# Patient Record
Sex: FEMALE | Race: ASIAN | Employment: OTHER | URBAN - METROPOLITAN AREA
[De-identification: names, ages, dates, MRNs, and addresses within clinical notes are randomized per-mention and may not be internally consistent; named-entity substitution may affect disease eponyms.]

---

## 2016-11-10 LAB — MAMMOGRAPHY, EXTERNAL: NORMAL

## 2017-02-01 ENCOUNTER — HOSPITAL ENCOUNTER (OUTPATIENT)
Dept: LAB | Age: 77
Discharge: HOME | End: 2017-02-01
Payer: MEDICARE

## 2017-02-01 ENCOUNTER — OFFICE VISIT (OUTPATIENT)
Dept: FAMILY MEDICINE CLINIC | Age: 77
End: 2017-02-01

## 2017-02-01 VITALS
SYSTOLIC BLOOD PRESSURE: 129 MMHG | DIASTOLIC BLOOD PRESSURE: 70 MMHG | HEART RATE: 78 BPM | OXYGEN SATURATION: 93 % | RESPIRATION RATE: 16 BRPM | WEIGHT: 87 LBS | HEIGHT: 62 IN | TEMPERATURE: 97 F | BODY MASS INDEX: 16.01 KG/M2

## 2017-02-01 DIAGNOSIS — E78.5 HYPERLIPIDEMIA, UNSPECIFIED HYPERLIPIDEMIA TYPE: ICD-10-CM

## 2017-02-01 DIAGNOSIS — E55.9 VITAMIN D DEFICIENCY: ICD-10-CM

## 2017-02-01 DIAGNOSIS — I10 ESSENTIAL HYPERTENSION, BENIGN: ICD-10-CM

## 2017-02-01 DIAGNOSIS — I10 ESSENTIAL HYPERTENSION, BENIGN: Primary | ICD-10-CM

## 2017-02-01 DIAGNOSIS — J47.9 BRONCHIECTASIS WITHOUT COMPLICATION (HCC): ICD-10-CM

## 2017-02-01 DIAGNOSIS — F32.1 MODERATE SINGLE CURRENT EPISODE OF MAJOR DEPRESSIVE DISORDER (HCC): ICD-10-CM

## 2017-02-01 DIAGNOSIS — M81.0 OSTEOPOROSIS: ICD-10-CM

## 2017-02-01 LAB
ALBUMIN SERPL BCP-MCNC: 3.7 G/DL (ref 3.4–5)
ALBUMIN/GLOB SERPL: 1 {RATIO} (ref 0.8–1.7)
ALP SERPL-CCNC: 41 U/L (ref 45–117)
ALT SERPL-CCNC: 18 U/L (ref 13–56)
ANION GAP BLD CALC-SCNC: 7 MMOL/L (ref 3–18)
AST SERPL W P-5'-P-CCNC: 18 U/L (ref 15–37)
BASOPHILS # BLD AUTO: 0 K/UL (ref 0–0.06)
BASOPHILS # BLD: 0 % (ref 0–2)
BILIRUB SERPL-MCNC: 0.5 MG/DL (ref 0.2–1)
BUN SERPL-MCNC: 12 MG/DL (ref 7–18)
BUN/CREAT SERPL: 18 (ref 12–20)
CALCIUM SERPL-MCNC: 8.3 MG/DL (ref 8.5–10.1)
CHLORIDE SERPL-SCNC: 100 MMOL/L (ref 100–108)
CHOLEST SERPL-MCNC: 186 MG/DL
CO2 SERPL-SCNC: 31 MMOL/L (ref 21–32)
CREAT SERPL-MCNC: 0.65 MG/DL (ref 0.6–1.3)
DIFFERENTIAL METHOD BLD: NORMAL
EOSINOPHIL # BLD: 0.2 K/UL (ref 0–0.4)
EOSINOPHIL NFR BLD: 3 % (ref 0–5)
ERYTHROCYTE [DISTWIDTH] IN BLOOD BY AUTOMATED COUNT: 13.7 % (ref 11.6–14.5)
GLOBULIN SER CALC-MCNC: 3.8 G/DL (ref 2–4)
GLUCOSE SERPL-MCNC: 82 MG/DL (ref 74–99)
HCT VFR BLD AUTO: 43.1 % (ref 35–45)
HDLC SERPL-MCNC: 96 MG/DL (ref 40–60)
HDLC SERPL: 1.9 {RATIO} (ref 0–5)
HGB BLD-MCNC: 13.9 G/DL (ref 12–16)
LDLC SERPL CALC-MCNC: 78.4 MG/DL (ref 0–100)
LIPID PROFILE,FLP: ABNORMAL
LYMPHOCYTES # BLD AUTO: 47 % (ref 21–52)
LYMPHOCYTES # BLD: 2.4 K/UL (ref 0.9–3.6)
MCH RBC QN AUTO: 30.4 PG (ref 24–34)
MCHC RBC AUTO-ENTMCNC: 32.3 G/DL (ref 31–37)
MCV RBC AUTO: 94.3 FL (ref 74–97)
MONOCYTES # BLD: 0.4 K/UL (ref 0.05–1.2)
MONOCYTES NFR BLD AUTO: 7 % (ref 3–10)
NEUTS SEG # BLD: 2.3 K/UL (ref 1.8–8)
NEUTS SEG NFR BLD AUTO: 43 % (ref 40–73)
PLATELET # BLD AUTO: 214 K/UL (ref 135–420)
PMV BLD AUTO: 11.1 FL (ref 9.2–11.8)
POTASSIUM SERPL-SCNC: 4.4 MMOL/L (ref 3.5–5.5)
PROT SERPL-MCNC: 7.5 G/DL (ref 6.4–8.2)
RBC # BLD AUTO: 4.57 M/UL (ref 4.2–5.3)
SODIUM SERPL-SCNC: 138 MMOL/L (ref 136–145)
TRIGL SERPL-MCNC: 58 MG/DL (ref ?–150)
VLDLC SERPL CALC-MCNC: 11.6 MG/DL
WBC # BLD AUTO: 5.3 K/UL (ref 4.6–13.2)

## 2017-02-01 PROCEDURE — 80061 LIPID PANEL: CPT | Performed by: INTERNAL MEDICINE

## 2017-02-01 PROCEDURE — 85025 COMPLETE CBC W/AUTO DIFF WBC: CPT | Performed by: INTERNAL MEDICINE

## 2017-02-01 PROCEDURE — 36415 COLL VENOUS BLD VENIPUNCTURE: CPT | Performed by: INTERNAL MEDICINE

## 2017-02-01 PROCEDURE — 80053 COMPREHEN METABOLIC PANEL: CPT | Performed by: INTERNAL MEDICINE

## 2017-02-01 NOTE — MR AVS SNAPSHOT
Visit Information Date & Time Provider Department Dept. Phone Encounter #  
 2/1/2017 10:30 AM Heather Betancourt MD [place] [address] [id number] Follow-up Instructions Return in about 3 months (around 5/1/2017) for follow up. Your Appointments 7/31/2017 10:30 AM  
Office Visit with Heather Betancourt MD  
[place] [address] ([facility]) Appt Note: mwv  
 [address] [place]  
  
   
 [address] [facility] Upcoming Health Maintenance Date Due  
 MEDICARE YEARLY EXAM 7/29/2017 BREAST CANCER SCRN MAMMOGRAM 11/10/2017 GLAUCOMA SCREENING Q2Y 6/27/2018 COLONOSCOPY 1/1/2021 DTaP/Tdap/Td series (2 - Td) 7/28/2026 Allergies as of 2/1/2017  Review Complete On: 2/1/2017 By: Heather Betancourt MD  
 No Known Allergies Current Immunizations  Reviewed on 10/31/2016 Name Date Influenza High Dose Vaccine PF 10/31/2016 Influenza Vaccine 9/15/2014, 12/16/2013 Influenza Vaccine Split 12/4/2012 Pneumococcal Conjugate (PCV-13) 10/9/2015 Pneumococcal Polysaccharide (PPSV-23) 1/1/2005 Tdap 7/28/2016 Not reviewed this visit You Were Diagnosed With   
  
 Codes Comments Essential hypertension, benign    -  Primary ICD-10-CM: I10 
ICD-9-CM: 401.1 Hyperlipidemia, unspecified hyperlipidemia type     ICD-10-CM: E78.5 ICD-9-CM: 272.4 Osteoporosis     ICD-10-CM: M81.0 ICD-9-CM: 733.00 Moderate single current episode of major depressive disorder (HCC)     ICD-10-CM: F32.1 ICD-9-CM: 296.22 Vitamin D deficiency     ICD-10-CM: E55.9 ICD-9-CM: 268.9 Bronchiectasis without complication ([facility]     LRS-95-PI: J47.9 ICD-9-CM: 494.0 Vitals BP Pulse Temp Resp Height(growth percentile) Weight(growth percentile) 129/70 78 97 °F (36.1 °C) (Oral) 16 5' 1.5\" (1.562 m) 87 lb (39.5 kg) SpO2 BMI OB Status Smoking Status 93% 16.17 kg/m2 Postmenopausal Never Smoker Vitals History BMI and BSA Data Body Mass Index Body Surface Area  
 16.17 kg/m 2 1.31 m 2 Preferred Pharmacy Pharmacy Name Phone [facility], [address] [place] [address] [phone] Your Updated Medication List  
  
   
This list is accurate as of: 2/1/17 10:43 AM.  Always use your most recent med list. amLODIPine 5 mg tablet Commonly known as:  Isaiah Bailey Take 5 mg by mouth daily. CITRACAL + D PO Take  by mouth. flaxseed oil 1,000 mg Cap Take  by mouth. LYCOPENE PO Take  by mouth.  
  
 mirtazapine 30 mg tablet Commonly known as:  Antione Doles Take 1 Tab by mouth daily. red yeast rice extract 600 mg Cap Take 600 mg by mouth now.  
  
 simvastatin 20 mg tablet Commonly known as:  ZOCOR Take 1 Tab by mouth nightly. Follow-up Instructions Return in about 3 months (around 5/1/2017) for follow up. To-Do List   
 02/01/2017 Lab:  CBC WITH AUTOMATED DIFF   
  
 02/01/2017 Lab:  LIPID PANEL   
  
 02/01/2017 Lab:  METABOLIC PANEL, COMPREHENSIVE Patient Instructions Osteoporosis: Care Instructions Your Care Instructions Osteoporosis causes bones to become thin and weak. It is much more common in women than in men. Osteoporosis may be very advanced before you know you have it. Sometimes the first sign is a broken bone in the hip, spine, or wrist or sudden pain in your middle or lower back. Follow-up care is a key part of your treatment and safety. Be sure to make and go to all appointments, and call your doctor if you are having problems. Its also a good idea to know your test results and keep a list of the medicines you take. How can you care for yourself at home? · Your doctor may prescribe a bisphosphonate, such as risedronate (Actonel) or alendronate (Fosamax), for osteoporosis.  If you are taking one of these medicines by mouth: 
¨ Take your medicine with a full glass of water when you first get up in the morning. ¨ Do not lie down, eat, drink a beverage, or take any other medicine for at least 30 minutes after taking the drug. This helps prevent stomach problems. ¨ Do not take your medicine late in the day if you forgot to take it in the morning. Skip it, and take the usual dose the next morning. ¨ If you have side effects, tell your doctor. He or she may prescribe another medicine. · Get enough calcium and vitamin D. The [place] of Medicine recommends adults younger than age 46 need 1,000 mg of calcium and 600 IU of vitamin D each day. Women ages 46 to 79 need 1,200 mg of calcium and 600 IU of vitamin D each day. Men ages 46 to 79 need 1,000 mg of calcium and 600 IU of vitamin D each day. Adults 71 and older need 1,200 mg of calcium and 800 IU of vitamin D each day. Francisco Claudette ¨ Eat foods rich in calcium, like yogurt, cheese, milk, and dark green vegetables. This is a good way to get the calcium you need. You can get vitamin D from eggs, fatty fish, cereal, and milk. ¨ Talk to your doctor about taking a calcium plus vitamin D supplement. Be careful, though. Adults ages 23 to 48 should not get more than 2,500 mg of calcium and 4,000 IU of vitamin D each day, whether it is from supplements and/or food. Adults ages 46 and older should not get more than 2,000 mg of calcium and 4,000 IU of vitamin D each day from supplements and/or food. · Limit alcohol to 2 drinks a day for men and 1 drink a day for women. Too much alcohol can cause health problems. · Do not smoke. Smoking puts you at a much higher risk for osteoporosis. If you need help quitting, talk to your doctor about stop-smoking programs and medicines. These can increase your chances of quitting for good. · Get regular bone-building exercise.  Weight-bearing and resistance exercises keep bones healthy by working the muscles and bones against gravity. Start out at an exercise level that feels right for you. Add a little at a time until you can do the following: ¨ Do 30 minutes of weight-bearing exercise on most days of the week. Walking, jogging, stair climbing, and dancing are good choices. ¨ Do resistance exercises with weights or elastic bands 2 to 3 days a week. · Reduce your risk of falls: 
¨ Wear supportive shoes with low heels and nonslip soles. ¨ Use a cane or walker, if you need it. Use shower chairs and bath benches. Put in handrails on stairways, around your shower or tub area, and near the toilet. ¨ Keep stairs, porches, and walkways well lit. Use night-lights. ¨ Remove throw rugs and other objects that are in the way. ¨ Avoid icy, wet, or slippery surfaces. ¨ Keep a cordless phone and a flashlight with new batteries by your bed. When should you call for help? Call your doctor now or seek immediate medical care if: 
· You think you have broken a bone, have pain and swelling after a fall, or cannot move a part of your body. · You have sudden, severe pain when you stand or walk. Watch closely for changes in your health, and be sure to contact your doctor if you have any problems. Where can you learn more? Go to http://mandy-rossy.info/. Enter K100 in the search box to learn more about \"Osteoporosis: Care Instructions. \" Current as of: August 4, 2016 Content Version: 11.1 © 2596-5525 Healthwise, Incorporated. Care instructions adapted under license by [employer] (which disclaims liability or warranty for this information). If you have questions about a medical condition or this instruction, always ask your healthcare professional. [address] any warranty or liability for your use of this information. Introducing [facility] [place] [facility]! Dear Trini [place]: 
Thank you for requesting a Vantage[employer]M account.   Our records indicate that you already have an active USEUM account. You can access your account anytime at [email or web address]. Dial a Dealer/[employer] Works Did you know that you can access your hospital and ER discharge instructions at any time in USEUM? You can also review all of your test results from your hospital stay or ER visit. Additional Information If you have questions, please visit the Frequently Asked Questions section of the USEUM website at [email or web address]. Dial a Dealer/[employer] Playground[employer]/. Remember, USEUM is NOT to be used for urgent needs. For medical emergencies, dial 911. Now available from your iPhone and Android! Please provide this summary of care documentation to your next provider. Your primary care clinician is listed as Bernice Jimenez. If you have any questions after today's visit, please call [phone].

## 2017-02-01 NOTE — PROGRESS NOTES
Daphne Pantoja is here today to follow up for chronic conditions. 1. Have you been to the ER, urgent care clinic since your last visit? Hospitalized since your last visit? No    2. Have you seen or consulted any other health care providers outside of the [address] [place] [address] since your last visit? Include any pap smears or colon screening.  No

## 2017-02-01 NOTE — PROGRESS NOTES
Chief Complaint   Patient presents with    Hypertension     Follow up    Osteoporosis    Cholesterol Problem    Vitamin D Deficiency       Pt is a 67y.o. year old female who presents for follow up of her chronic medical problems    HTN  BP Readings from Last 3 Encounters:   02/01/17 129/70   10/31/16 130/60   07/28/16 116/61   Compliant with BP med    Wt Readings from Last 3 Encounters:   02/01/17 87 lb (39.5 kg)   10/31/16 87 lb 9.6 oz (39.7 kg)   07/28/16 86 lb 6.4 oz (39.2 kg)   Used to take Ensure-does not like it because it is too sweet    Vit D def  Lab Results   Component Value Date/Time    Vitamin D 25-Hydroxy 29.3 07/28/2016 11:04 AM    VITAMIN D, 25-HYDROXY 27.5 07/27/2015 09:52 AM    On OTC Ca+D     Hyperlipidemia  Lab Results   Component Value Date/Time    Cholesterol, total 187 07/28/2016 11:04 AM    Cholesterol, Total 191 09/07/2012 12:00 AM    HDL Cholesterol 87 07/28/2016 11:04 AM    LDL, calculated 86.2 07/28/2016 11:04 AM    VLDL, calculated 13.8 07/28/2016 11:04 AM    Triglyceride 69 07/28/2016 11:04 AM    CHOL/HDL Ratio 2.1 07/28/2016 11:04 AM   Fasting? yes  Compliant with Zocor-no side effects    Osteoporosis-had first dose of Prolia; niece who brings her in for the shot is out of town  [address] [place], [place] [address]/28/2014  [address]  Result Impression   Impression:    1.  BMD R Cee [address] 99. Saw Pulmo recently-note reviewed:  Naeem Tejeda MD - 01/23/2017 9:13 AM EST  01/23/2017, [address] [facility] pulm office  66 Y/o Lady With A H/o pulm MYRIAM ( Chitra Sharma has been id from the lungs by bronchoscopy and she has completed 1 year of therapy for that by Dr. Chencho Lima of infectious disease at [facility]), was last seen by me On 10/05/2015 And Presents Today For A routine F/u of her pulm MYRIAM.   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 Flor Thurman of infectious disease at [facility] PLAN:  I think that your Current Respiratory / lung condition is stable. Continue Monitoring Yourself For Any Sign or Symptom of Lung infection such as Increased Cough, increased Sputum Production, change in color of Your Sputum or Fever And Call if Question. Return to the Pulmonary Office in 1 year. Depression  On higher dose of Remeron-tolerating this dose; denies any new problems or depression    There are no preventive care reminders to display for this patient. ROS:    Pt denies: Wt loss, Fever/Chills, HA, Visual changes, Fatigue, Chest pain, SOB, KUMAR, Abd pain, N/V/D/C, Blood in stool or urine, Edema. Pertinent positive as above in HPI. All others were negative    Patient Active Problem List   Diagnosis Code    Hyperlipidemia E78.5    Essential hypertension, benign I10    Osteoporosis M81.0    Vitamin D deficiency E55.9    Advance directive discussed with patient Z70.80    Moderate single current episode of major depressive disorder ([facility] F32.1    Bronchiectasis without complication ([facility] J41.6       Past Medical History   Diagnosis Date    Hypercholesterolemia      high in past / was on meds at that time but now off    MYRIAM (mycobacterium avium-intracellulare) infection ([facility]      treated and released by Pulmonary       Current Outpatient Prescriptions   Medication Sig Dispense Refill    amLODIPine (NORVASC) 5 mg tablet Take 5 mg by mouth daily.  mirtazapine (REMERON) 30 mg tablet Take 1 Tab by mouth daily. 90 Tab 3    simvastatin (ZOCOR) 20 mg tablet Take 1 Tab by mouth nightly. 90 Tab 3    CALCIUM PHOSPHATE TRIB/VIT D3 (CITRACAL + D PO) Take  by mouth.  flaxseed oil 1,000 mg Cap Take  by mouth.  LYCOPENE PO Take  by mouth.       red yeast rice extract 600 mg Cap Take 600 mg by mouth now. History   Smoking Status    Never Smoker   Smokeless Tobacco    Never Used       No Known Allergies    Patient Labs were reviewed: yes      Patient Past Records were reviewed:  yes        Objective:     Vitals:    02/01/17 1024   BP: 129/70   Pulse: 78   Resp: 16   Temp: 97 °F (36.1 °C)   TempSrc: Oral   SpO2: 93%   Weight: 87 lb (39.5 kg)   Height: 5' 1.5\" (1.562 m)     Body mass index is 16.17 kg/(m^2). Exam:   Appearance: alert, well appearing,  oriented to person, place, and time, acyanotic, in no respiratory distress and well hydrated. HEENT:  NC/AT, pink conj, anicteric sclerae  Neck:  No cervical lymphadenopathy, no JVD, no thyromegaly, no carotid bruit  Heart:  RRR without M/R/G  Lungs:  CTAB, no rhonchi, rales, or wheezes with good air exchange   Abdomen:  Non-tender, pos bowel sounds, no hepatosplenomegaly  Ext:  No C/C/E    Skin: no rash  Neuro: no lateralizing signs, CNs II-XII intact      Assessment/ Plan:   Melva was seen today for hypertension, osteoporosis, cholesterol problem and vitamin d deficiency. Diagnoses and all orders for this visit:    Essential hypertension, benign-controlled-continue with present meds  -     CBC WITH AUTOMATED DIFF; Future  -     METABOLIC PANEL, COMPREHENSIVE; Future    Hyperlipidemia, unspecified hyperlipidemia type-at goal on Zocor  -     LIPID PANEL; Future    Osteoporosis-will schedule next Prolia shot when her niece comes back; continue with Ca+D daily; advised to walk for exercise    Moderate single current episode of major depressive disorder (HCC)-continue with Remeron; saw Psych last year    Vitamin D deficiency-on OTC Ca+d    Bronchiectasis without complication (HCC)-asymptomatic; Pulmo note reviewed with patient today      Follow-up Disposition:  Return in about 3 months (around 5/1/2017) for follow up. I have discussed the diagnosis with the patient and the intended plan as seen in the above orders.   The patient has received an After-Visit Summary and questions were answered concerning future plans. Medication Side Effects and Warnings were discussed with patient: yes    Patient verbalized understanding of above instructions.     Guzman Rodriguez MD  Internal Medicine  [facility]

## 2017-02-01 NOTE — PATIENT INSTRUCTIONS
Osteoporosis: Care Instructions  Your Care Instructions    Osteoporosis causes bones to become thin and weak. It is much more common in women than in men. Osteoporosis may be very advanced before you know you have it. Sometimes the first sign is a broken bone in the hip, spine, or wrist or sudden pain in your middle or lower back. Follow-up care is a key part of your treatment and safety. Be sure to make and go to all appointments, and call your doctor if you are having problems. Its also a good idea to know your test results and keep a list of the medicines you take. How can you care for yourself at home? · Your doctor may prescribe a bisphosphonate, such as risedronate (Actonel) or alendronate (Fosamax), for osteoporosis. If you are taking one of these medicines by mouth:  ¨ Take your medicine with a full glass of water when you first get up in the morning. ¨ Do not lie down, eat, drink a beverage, or take any other medicine for at least 30 minutes after taking the drug. This helps prevent stomach problems. ¨ Do not take your medicine late in the day if you forgot to take it in the morning. Skip it, and take the usual dose the next morning. ¨ If you have side effects, tell your doctor. He or she may prescribe another medicine. · Get enough calcium and vitamin D. The [place] of Medicine recommends adults younger than age 46 need 1,000 mg of calcium and 600 IU of vitamin D each day. Women ages 46 to 79 need 1,200 mg of calcium and 600 IU of vitamin D each day. Men ages 46 to 79 need 1,000 mg of calcium and 600 IU of vitamin D each day. Adults 71 and older need 1,200 mg of calcium and 800 IU of vitamin D each day. Barbara Bannister Eat foods rich in calcium, like yogurt, cheese, milk, and dark green vegetables. This is a good way to get the calcium you need. You can get vitamin D from eggs, fatty fish, cereal, and milk. ¨ Talk to your doctor about taking a calcium plus vitamin D supplement. Be careful, though. Adults ages 23 to 48 should not get more than 2,500 mg of calcium and 4,000 IU of vitamin D each day, whether it is from supplements and/or food. Adults ages 46 and older should not get more than 2,000 mg of calcium and 4,000 IU of vitamin D each day from supplements and/or food. · Limit alcohol to 2 drinks a day for men and 1 drink a day for women. Too much alcohol can cause health problems. · Do not smoke. Smoking puts you at a much higher risk for osteoporosis. If you need help quitting, talk to your doctor about stop-smoking programs and medicines. These can increase your chances of quitting for good. · Get regular bone-building exercise. Weight-bearing and resistance exercises keep bones healthy by working the muscles and bones against gravity. Start out at an exercise level that feels right for you. Add a little at a time until you can do the following:  ¨ Do 30 minutes of weight-bearing exercise on most days of the week. Walking, jogging, stair climbing, and dancing are good choices. ¨ Do resistance exercises with weights or elastic bands 2 to 3 days a week. · Reduce your risk of falls:  ¨ Wear supportive shoes with low heels and nonslip soles. ¨ Use a cane or walker, if you need it. Use shower chairs and bath benches. Put in handrails on stairways, around your shower or tub area, and near the toilet. ¨ Keep stairs, porches, and walkways well lit. Use night-lights. ¨ Remove throw rugs and other objects that are in the way. ¨ Avoid icy, wet, or slippery surfaces. ¨ Keep a cordless phone and a flashlight with new batteries by your bed. When should you call for help? Call your doctor now or seek immediate medical care if:  · You think you have broken a bone, have pain and swelling after a fall, or cannot move a part of your body. · You have sudden, severe pain when you stand or walk. Watch closely for changes in your health, and be sure to contact your doctor if you have any problems.   Where can you learn more? Go to http://mandy-rossy.info/. Enter K100 in the search box to learn more about \"Osteoporosis: Care Instructions. \"  Current as of: August 4, 2016  Content Version: 11.1  © 1290-4385 [employer]. Care instructions adapted under license by [employer] (which disclaims liability or warranty for this information). If you have questions about a medical condition or this instruction, always ask your healthcare professional. [address] any warranty or liability for your use of this information.

## 2017-02-06 ENCOUNTER — TELEPHONE (OUTPATIENT)
Dept: FAMILY MEDICINE CLINIC | Age: 77
End: 2017-02-06

## 2017-02-06 NOTE — TELEPHONE ENCOUNTER
Spoke with RossanaKarla Bender's niece. Verified two patient identifiers. Informed patient of lab results per provider. Made patient's niece aware that her labs were normal and to continue with current medications. Patient's niece verbalized understanding.

## 2017-03-23 ENCOUNTER — TELEPHONE (OUTPATIENT)
Dept: PRIMARY CARE CLINIC | Age: 77
End: 2017-03-23

## 2017-03-23 NOTE — TELEPHONE ENCOUNTER
Daughter calling because her mother is needing another prescription sent to the [facility] infusion [place] for her prolia shot. Fax number is [phone].  They are wanting to know if it can be for every 6 mos

## 2017-03-24 DIAGNOSIS — M81.0 OSTEOPOROSIS: Primary | ICD-10-CM

## 2017-03-28 DIAGNOSIS — M81.0 OSTEOPOROSIS: ICD-10-CM

## 2017-03-28 NOTE — TELEPHONE ENCOUNTER
Faxed patient's Prolia prescription to [facility] @ [phone] with fax confirmation received.

## 2017-04-04 ENCOUNTER — TELEPHONE (OUTPATIENT)
Dept: FAMILY MEDICINE CLINIC | Age: 77
End: 2017-04-04

## 2017-04-04 DIAGNOSIS — M81.0 AGE-RELATED OSTEOPOROSIS WITHOUT CURRENT PATHOLOGICAL FRACTURE: ICD-10-CM

## 2017-04-04 DIAGNOSIS — M81.0 AGE-RELATED OSTEOPOROSIS WITHOUT CURRENT PATHOLOGICAL FRACTURE: Primary | ICD-10-CM

## 2017-04-04 NOTE — TELEPHONE ENCOUNTER
Faxed Calcium order to [facility] @ [phone] with fax confirmation received.

## 2017-04-04 NOTE — TELEPHONE ENCOUNTER
[address] chopra and they are requesting an order for Calcium level to be drawn because patient is getting her Prolia injection today.

## 2017-05-01 ENCOUNTER — OFFICE VISIT (OUTPATIENT)
Dept: FAMILY MEDICINE CLINIC | Age: 77
End: 2017-05-01

## 2017-05-01 VITALS
TEMPERATURE: 97.2 F | SYSTOLIC BLOOD PRESSURE: 119 MMHG | RESPIRATION RATE: 16 BRPM | DIASTOLIC BLOOD PRESSURE: 61 MMHG | WEIGHT: 88 LBS | HEIGHT: 62 IN | HEART RATE: 77 BPM | OXYGEN SATURATION: 95 % | BODY MASS INDEX: 16.2 KG/M2

## 2017-05-01 DIAGNOSIS — I10 ESSENTIAL HYPERTENSION, BENIGN: Primary | ICD-10-CM

## 2017-05-01 DIAGNOSIS — M81.0 AGE-RELATED OSTEOPOROSIS WITHOUT CURRENT PATHOLOGICAL FRACTURE: ICD-10-CM

## 2017-05-01 DIAGNOSIS — E78.5 HYPERLIPIDEMIA, UNSPECIFIED HYPERLIPIDEMIA TYPE: ICD-10-CM

## 2017-05-01 RX ORDER — AMLODIPINE BESYLATE 5 MG/1
5 TABLET ORAL DAILY
Qty: 90 TAB | Refills: 3 | Status: SHIPPED | OUTPATIENT
Start: 2017-05-01 | End: 2017-05-01 | Stop reason: SDUPTHER

## 2017-05-01 RX ORDER — AMLODIPINE BESYLATE 5 MG/1
5 TABLET ORAL DAILY
Qty: 90 TAB | Refills: 3 | Status: SHIPPED | OUTPATIENT
Start: 2017-05-01 | End: 2018-06-11 | Stop reason: SDUPTHER

## 2017-05-01 NOTE — PROGRESS NOTES
Chief Complaint   Patient presents with    Osteoporosis     Follow up    Cholesterol Problem    Vitamin D Deficiency       Pt is a 65y.o. year old female who presents for follow up of her chronic medical problems    BP Readings from Last 3 Encounters:   05/01/17 119/61   02/01/17 129/70   10/31/16 130/60   Compliant with Norvasc, no side effects    Wt Readings from Last 3 Encounters:   05/01/17 88 lb (39.9 kg)   02/01/17 87 lb (39.5 kg)   10/31/16 87 lb 9.6 oz (39.7 kg)       Lab Results   Component Value Date/Time    Cholesterol, total 186 02/01/2017 10:45 AM    HDL Cholesterol 96 02/01/2017 10:45 AM    LDL, calculated 78.4 02/01/2017 10:45 AM    VLDL, calculated 11.6 02/01/2017 10:45 AM    Triglyceride 58 02/01/2017 10:45 AM    CHOL/HDL Ratio 1.9 02/01/2017 10:45 AM   On Zocor-compliant, denies any side effects    Lab Results   Component Value Date/Time    Vitamin D 25-Hydroxy 29.3 07/28/2016 11:04 AM      Had 2 shots of Prolia-last was recently  No hx of fractures  Last Dexa was in 2014-    Denies any new problems     ROS:    Pt denies: Wt loss, Fever/Chills, HA, Visual changes, Fatigue, Chest pain, SOB, KUMAR, Abd pain, N/V/D/C, Blood in stool or urine, Edema. Pertinent positive as above in HPI.  All others were negative    Patient Active Problem List   Diagnosis Code    Hyperlipidemia E78.5    Essential hypertension, benign I10    Vitamin D deficiency E55.9    Advance directive discussed with patient Z70.80    Moderate single current episode of major depressive disorder ([address] F32.1    Bronchiectasis without complication ([address] S76.9    Age-related osteoporosis without current pathological fracture M81.0       Past Medical History:   Diagnosis Date    Hypercholesterolemia     high in past / was on meds at that time but now off    MYRIAM (mycobacterium avium-intracellulare) infection ([facility] [address]     treated and released by Pulmonary       Current Outpatient Prescriptions   Medication Sig Dispense Refill    amLODIPine (NORVASC) 5 mg tablet Take 1 Tab by mouth daily. 90 Tab 3    denosumab (PROLIA) 60 mg/mL injection Once every 6 months 1 mL 2    mirtazapine (REMERON) 30 mg tablet Take 1 Tab by mouth daily. 90 Tab 3    simvastatin (ZOCOR) 20 mg tablet Take 1 Tab by mouth nightly. 90 Tab 3    CALCIUM PHOSPHATE TRIB/VIT D3 (CITRACAL + D PO) Take  by mouth.  flaxseed oil 1,000 mg Cap Take  by mouth.  LYCOPENE PO Take  by mouth.  red yeast rice extract 600 mg Cap Take 600 mg by mouth now. History   Smoking Status    Never Smoker   Smokeless Tobacco    Never Used       No Known Allergies    Patient Labs were reviewed: yes      Patient Past Records were reviewed:  yes        Objective:     Vitals:    05/01/17 1011   BP: 119/61   Pulse: 77   Resp: 16   Temp: 97.2 °F (36.2 °C)   TempSrc: Oral   SpO2: 95%   Weight: 88 lb (39.9 kg)   Height: 5' 1.5\" (1.562 m)     Body mass index is 16.36 kg/(m^2). Exam:   Appearance: alert, well appearing,  oriented to person, place, and time, acyanotic, in no respiratory distress and well hydrated. HEENT:  NC/AT, pink conj, anicteric sclerae  Neck:  No cervical lymphadenopathy, no JVD, no thyromegaly, no carotid bruit  Heart:  RRR without M/R/G  Lungs:  CTAB, no rhonchi, rales, or wheezes with good air exchange   Abdomen:  Non-tender, pos bowel sounds, no hepatosplenomegaly  Ext:  No C/C/E    Skin: no rash  Neuro: no lateralizing signs, CNs II-XII intact  Back: no reproducible pain along the spine    Assessment/ Plan:   Melva was seen today for osteoporosis, cholesterol problem and vitamin d deficiency. Diagnoses and all orders for this visit:    Essential hypertension, benign-controlled, continue with present meds  -     amLODIPine (NORVASC) 5 mg tablet; Take 1 Tab by mouth daily.     Hyperlipidemia, unspecified hyperlipidemia type-at goal on Zocor    Age-related osteoporosis without current pathological fracture-continue with Prolia; will order follow up Dexa next visit; continue with OTC Ca+d daily        Follow-up Disposition:  Return in about 3 months (around 8/1/2017) for annual wellness, follow up. I have discussed the diagnosis with the patient and the intended plan as seen in the above orders. The patient has received an After-Visit Summary and questions were answered concerning future plans. Medication Side Effects and Warnings were discussed with patient: yes    Patient verbalized understanding of above instructions.     Raudel Mcdaniel MD  Internal Medicine  [facility]

## 2017-05-01 NOTE — PATIENT INSTRUCTIONS
Osteoporosis: Care Instructions  Your Care Instructions    Osteoporosis causes bones to become thin and weak. It is much more common in women than in men. Osteoporosis may be very advanced before you know you have it. Sometimes the first sign is a broken bone in the hip, spine, or wrist or sudden pain in your middle or lower back. Follow-up care is a key part of your treatment and safety. Be sure to make and go to all appointments, and call your doctor if you are having problems. Its also a good idea to know your test results and keep a list of the medicines you take. How can you care for yourself at home? · Your doctor may prescribe a bisphosphonate, such as risedronate (Actonel) or alendronate (Fosamax), for osteoporosis. If you are taking one of these medicines by mouth:  ¨ Take your medicine with a full glass of water when you first get up in the morning. ¨ Do not lie down, eat, drink a beverage, or take any other medicine for at least 30 minutes after taking the drug. This helps prevent stomach problems. ¨ Do not take your medicine late in the day if you forgot to take it in the morning. Skip it, and take the usual dose the next morning. ¨ If you have side effects, tell your doctor. He or she may prescribe another medicine. · Get enough calcium and vitamin D. The [employer] recommends adults younger than age 46 need 1,000 mg of calcium and 600 IU of vitamin D each day. Women ages 46 to 79 need 1,200 mg of calcium and 600 IU of vitamin D each day. Men ages 46 to 79 need 1,000 mg of calcium and 600 IU of vitamin D each day. Adults 71 and older need 1,200 mg of calcium and 800 IU of vitamin D each day. [place] Beets Eat foods rich in calcium, like yogurt, cheese, milk, and dark green vegetables. This is a good way to get the calcium you need. You can get vitamin D from eggs, fatty fish, cereal, and milk. ¨ Talk to your doctor about taking a calcium plus vitamin D supplement. Be careful, though. Adults ages 23 to 48 should not get more than 2,500 mg of calcium and 4,000 IU of vitamin D each day, whether it is from supplements and/or food. Adults ages 46 and older should not get more than 2,000 mg of calcium and 4,000 IU of vitamin D each day from supplements and/or food. · Limit alcohol to 2 drinks a day for men and 1 drink a day for women. Too much alcohol can cause health problems. · Do not smoke. Smoking puts you at a much higher risk for osteoporosis. If you need help quitting, talk to your doctor about stop-smoking programs and medicines. These can increase your chances of quitting for good. · Get regular bone-building exercise. Weight-bearing and resistance exercises keep bones healthy by working the muscles and bones against gravity. Start out at an exercise level that feels right for you. Add a little at a time until you can do the following:  ¨ Do 30 minutes of weight-bearing exercise on most days of the week. Walking, jogging, stair climbing, and dancing are good choices. ¨ Do resistance exercises with weights or elastic bands 2 to 3 days a week. · Reduce your risk of falls:  ¨ Wear supportive shoes with low heels and nonslip soles. ¨ Use a cane or walker, if you need it. Use shower chairs and bath benches. Put in handrails on stairways, around your shower or tub area, and near the toilet. ¨ Keep stairs, porches, and walkways well lit. Use night-lights. ¨ Remove throw rugs and other objects that are in the way. ¨ Avoid icy, wet, or slippery surfaces. ¨ Keep a cordless phone and a flashlight with new batteries by your bed. When should you call for help? Call your doctor now or seek immediate medical care if:  · You think you have broken a bone, have pain and swelling after a fall, or cannot move a part of your body. · You have sudden, severe pain when you stand or walk. Watch closely for changes in your health, and be sure to contact your doctor if you have any problems.   Where can you learn more? Go to http://mandy-rossy.info/. Enter K100 in the search box to learn more about \"Osteoporosis: Care Instructions. \"  Current as of: August 4, 2016  Content Version: 11.2  © 5556-8149 [employer]. Care instructions adapted under license by [employer] (which disclaims liability or warranty for this information). If you have questions about a medical condition or this instruction, always ask your healthcare professional. [address] any warranty or liability for your use of this information.

## 2017-05-01 NOTE — PROGRESS NOTES
Stew Villafana is here today to follow up for chronic conditions. 1. Have you been to the ER, urgent care clinic since your last visit? Hospitalized since your last visit? No    2. Have you seen or consulted any other health care providers outside of the [facility] since your last visit? Include any pap smears or colon screening.  No

## 2017-06-08 DIAGNOSIS — E78.5 HYPERLIPIDEMIA, UNSPECIFIED HYPERLIPIDEMIA TYPE: ICD-10-CM

## 2017-06-08 RX ORDER — SIMVASTATIN 20 MG/1
TABLET, FILM COATED ORAL
Qty: 90 TAB | Refills: 3 | Status: SHIPPED | OUTPATIENT
Start: 2017-06-08 | End: 2018-06-11 | Stop reason: SDUPTHER

## 2017-08-15 ENCOUNTER — OFFICE VISIT (OUTPATIENT)
Dept: FAMILY MEDICINE CLINIC | Age: 77
End: 2017-08-15

## 2017-08-15 ENCOUNTER — HOSPITAL ENCOUNTER (OUTPATIENT)
Dept: LAB | Age: 77
Discharge: HOME | End: 2017-08-15
Payer: MEDICARE

## 2017-08-15 VITALS
BODY MASS INDEX: 15.83 KG/M2 | HEIGHT: 62 IN | HEART RATE: 71 BPM | RESPIRATION RATE: 17 BRPM | OXYGEN SATURATION: 94 % | TEMPERATURE: 97 F | WEIGHT: 86 LBS | SYSTOLIC BLOOD PRESSURE: 130 MMHG | DIASTOLIC BLOOD PRESSURE: 64 MMHG

## 2017-08-15 DIAGNOSIS — Z00.00 ROUTINE GENERAL MEDICAL EXAMINATION AT A HEALTH CARE FACILITY: Primary | ICD-10-CM

## 2017-08-15 DIAGNOSIS — I10 ESSENTIAL HYPERTENSION, BENIGN: ICD-10-CM

## 2017-08-15 DIAGNOSIS — E55.9 VITAMIN D DEFICIENCY: ICD-10-CM

## 2017-08-15 DIAGNOSIS — F32.1 MODERATE SINGLE CURRENT EPISODE OF MAJOR DEPRESSIVE DISORDER (HCC): ICD-10-CM

## 2017-08-15 DIAGNOSIS — E78.5 HYPERLIPIDEMIA, UNSPECIFIED HYPERLIPIDEMIA TYPE: ICD-10-CM

## 2017-08-15 DIAGNOSIS — Z71.89 ADVANCE DIRECTIVE DISCUSSED WITH PATIENT: ICD-10-CM

## 2017-08-15 DIAGNOSIS — Z13.39 SCREENING FOR ALCOHOLISM: ICD-10-CM

## 2017-08-15 LAB
ALBUMIN SERPL BCP-MCNC: 4.1 G/DL (ref 3.4–5)
ALBUMIN/GLOB SERPL: 1 {RATIO} (ref 0.8–1.7)
ALP SERPL-CCNC: 42 U/L (ref 45–117)
ALT SERPL-CCNC: 16 U/L (ref 13–56)
ANION GAP BLD CALC-SCNC: 8 MMOL/L (ref 3–18)
AST SERPL W P-5'-P-CCNC: 18 U/L (ref 15–37)
BASOPHILS # BLD: 0 K/UL (ref 0–0.06)
BASOPHILS NFR BLD: 0 % (ref 0–2)
BILIRUB SERPL-MCNC: 0.4 MG/DL (ref 0.2–1)
BUN SERPL-MCNC: 11 MG/DL (ref 7–18)
BUN/CREAT SERPL: 17 (ref 12–20)
CALCIUM SERPL-MCNC: 9.3 MG/DL (ref 8.5–10.1)
CHLORIDE SERPL-SCNC: 99 MMOL/L (ref 100–108)
CHOLEST SERPL-MCNC: 173 MG/DL
CO2 SERPL-SCNC: 29 MMOL/L (ref 21–32)
CREAT SERPL-MCNC: 0.65 MG/DL (ref 0.6–1.3)
DIFFERENTIAL METHOD BLD: NORMAL
EOSINOPHIL # BLD: 0.2 K/UL (ref 0–0.4)
EOSINOPHIL NFR BLD: 3 % (ref 0–5)
ERYTHROCYTE [DISTWIDTH] IN BLOOD BY AUTOMATED COUNT: 13.5 % (ref 11.6–14.5)
GLOBULIN SER CALC-MCNC: 4 G/DL (ref 2–4)
GLUCOSE SERPL-MCNC: 89 MG/DL (ref 74–99)
HCT VFR BLD AUTO: 43 % (ref 35–45)
HDLC SERPL-MCNC: 97 MG/DL (ref 40–60)
HDLC SERPL: 1.8 {RATIO} (ref 0–5)
HGB BLD-MCNC: 14 G/DL (ref 12–16)
LDLC SERPL CALC-MCNC: 65.6 MG/DL (ref 0–100)
LIPID PROFILE,FLP: ABNORMAL
LYMPHOCYTES # BLD: 2.5 K/UL (ref 0.9–3.6)
LYMPHOCYTES NFR BLD: 43 % (ref 21–52)
MCH RBC QN AUTO: 30.8 PG (ref 24–34)
MCHC RBC AUTO-ENTMCNC: 32.6 G/DL (ref 31–37)
MCV RBC AUTO: 94.7 FL (ref 74–97)
MONOCYTES # BLD: 0.4 K/UL (ref 0.05–1.2)
MONOCYTES NFR BLD: 7 % (ref 3–10)
NEUTS SEG # BLD: 2.7 K/UL (ref 1.8–8)
NEUTS SEG NFR BLD: 47 % (ref 40–73)
PLATELET # BLD AUTO: 197 K/UL (ref 135–420)
PMV BLD AUTO: 11.3 FL (ref 9.2–11.8)
POTASSIUM SERPL-SCNC: 4.9 MMOL/L (ref 3.5–5.5)
PROT SERPL-MCNC: 8.1 G/DL (ref 6.4–8.2)
RBC # BLD AUTO: 4.54 M/UL (ref 4.2–5.3)
SODIUM SERPL-SCNC: 136 MMOL/L (ref 136–145)
TRIGL SERPL-MCNC: 52 MG/DL (ref ?–150)
TSH SERPL DL<=0.05 MIU/L-ACNC: 1.68 UIU/ML (ref 0.36–3.74)
VLDLC SERPL CALC-MCNC: 10.4 MG/DL
WBC # BLD AUTO: 5.8 K/UL (ref 4.6–13.2)

## 2017-08-15 PROCEDURE — 80053 COMPREHEN METABOLIC PANEL: CPT | Performed by: INTERNAL MEDICINE

## 2017-08-15 PROCEDURE — 85025 COMPLETE CBC W/AUTO DIFF WBC: CPT | Performed by: INTERNAL MEDICINE

## 2017-08-15 PROCEDURE — 80061 LIPID PANEL: CPT | Performed by: INTERNAL MEDICINE

## 2017-08-15 PROCEDURE — 36415 COLL VENOUS BLD VENIPUNCTURE: CPT | Performed by: INTERNAL MEDICINE

## 2017-08-15 PROCEDURE — 84443 ASSAY THYROID STIM HORMONE: CPT | Performed by: INTERNAL MEDICINE

## 2017-08-15 PROCEDURE — 82306 VITAMIN D 25 HYDROXY: CPT | Performed by: INTERNAL MEDICINE

## 2017-08-15 NOTE — PROGRESS NOTES
1. Have you been to the ER, urgent care clinic since your last visit? Hospitalized since your last visit? No    2. Have you seen or consulted any other health care providers outside of the [address] [place] [address] since your last visit? Include any pap smears or colon screening.  No       Patient here today for medicare wellness exam

## 2017-08-15 NOTE — PATIENT INSTRUCTIONS

## 2017-08-15 NOTE — PROGRESS NOTES
Chief Complaint   Patient presents with    Annual Wellness Visit    Follow Up Chronic Condition       Pt is a 65y.o. year old female who presents for follow up of her chronic medical problems    Health Maintenance Due   Topic Date Due    MEDICARE YEARLY EXAM  07/29/2017-today    INFLUENZA AGE 9 TO ADULT  08/01/2017    BREAST CANCER SCRN MAMMOGRAM  11/10/2017     BP Readings from Last 3 Encounters:   08/15/17 150/65   05/01/17 119/61   02/01/17 129/70   Repeat BP-better    Wt Readings from Last 3 Encounters:   08/15/17 86 lb (39 kg)   05/01/17 88 lb (39.9 kg)   02/01/17 87 lb (39.5 kg)       Very sad-since  passed away    Lab Results   Component Value Date/Time    Cholesterol, total 186 02/01/2017 10:45 AM    HDL Cholesterol 96 02/01/2017 10:45 AM    LDL, calculated 78.4 02/01/2017 10:45 AM    VLDL, calculated 11.6 02/01/2017 10:45 AM    Triglyceride 58 02/01/2017 10:45 AM    CHOL/HDL Ratio 1.9 02/01/2017 10:45 AM   Fasting today  On Zocor    Numb on the left lower leg-better when she massages it    Has been getting Prolia shots for osteoporosis, denies any side effects-will get Dexa 2 yrs after she started the Prolia      ROS:    Pt denies: Wt loss, Fever/Chills, HA, Visual changes, Fatigue, Chest pain, SOB, KUMAR, Abd pain, N/V/D/C, Blood in stool or urine, Edema. Pertinent positive as above in HPI.  All others were negative    Patient Active Problem List   Diagnosis Code    Hyperlipidemia E78.5    Essential hypertension, benign I10    Vitamin D deficiency E55.9    Advance directive discussed with patient Z70.80    Moderate single current episode of major depressive disorder ([address] F32.1    Bronchiectasis without complication ([address] R84.8    Age-related osteoporosis without current pathological fracture M81.0       Past Medical History:   Diagnosis Date    Hypercholesterolemia     high in past / was on meds at that time but now off    MYRIAM (mycobacterium avium-intracellulare) infection ([address] treated and released by Pulmonary       Current Outpatient Prescriptions   Medication Sig Dispense Refill    simvastatin (ZOCOR) 20 mg tablet TAKE 1 TAB BY MOUTH NIGHTLY. 90 Tab 3    amLODIPine (NORVASC) 5 mg tablet Take 1 Tab by mouth daily. 90 Tab 3    denosumab (PROLIA) 60 mg/mL injection Once every 6 months 1 mL 2    flaxseed oil 1,000 mg Cap Take  by mouth.  red yeast rice extract 600 mg Cap Take 600 mg by mouth now.  Mirtazapine 30 mg Once a day      CALCIUM PHOSPHATE TRIB/VIT D3 (CITRACAL + D PO) Take  by mouth.  LYCOPENE PO Take  by mouth. History   Smoking Status    Never Smoker   Smokeless Tobacco    Never Used       No Known Allergies    Patient Labs were reviewed: yes      Patient Past Records were reviewed:  yes        Objective:     Vitals:    08/15/17 1054 08/15/17 1137   BP: 150/65 130/64   Pulse: 71    Resp: 17    Temp: 97 °F (36.1 °C)    TempSrc: Oral    SpO2: 94%    Weight: 86 lb (39 kg)    Height: 5' 1.5\" (1.562 m)      Body mass index is 15.99 kg/(m^2). Exam:   Appearance: alert, well appearing,  oriented to person, place, and time, acyanotic, in no respiratory distress and well hydrated. HEENT:  NC/AT, pink conj, anicteric sclerae  Neck:  No cervical lymphadenopathy, no JVD, no thyromegaly, no carotid bruit  Heart:  RRR without M/R/G  Lungs:  CTAB, no rhonchi, rales, or wheezes with good air exchange   Abdomen:  Non-tender, pos bowel sounds, no hepatosplenomegaly  Ext:  No C/C/E    Skin: no rash  Neuro: no lateralizing signs, CNs II-XII intact        Diagnoses and all orders for this visit:    1. Routine general medical examination at a health care facility-see note below  Patient is underweight-advised her and niece to try Boost at least 2x a day instead of once a week like she is doing right now    2. Essential hypertension, benign-controlled, continue with present meds  -     CBC WITH AUTOMATED DIFF; Future  -     METABOLIC PANEL, COMPREHENSIVE; Future    3. Hyperlipidemia, unspecified hyperlipidemia type-at goal on Zocor  -     LIPID PANEL; Future    4. Moderate single current episode of major depressive disorder (HCC)-symptomatic; I spoke with patient's niece  -     TSH 3RD GENERATION; Future    5. Vitamin D deficiency-continue with OTC Ca+D daily  -     VITAMIN D, 25 HYDROXY; Future      Follow-up Disposition:  Return in about 3 months (around 11/15/2017) for follow up, flu vaccine. I have discussed the diagnosis with the patient and the intended plan as seen in the above orders. The patient has received an After-Visit Summary and questions were answered concerning future plans. Medication Side Effects and Warnings were discussed with patient: yes    Patient verbalized understanding of above instructions. Omkar Mayfield MD  Internal Medicine  [facility]    This is a Subsequent Medicare Annual Wellness Visit providing Personalized Prevention Plan Services (PPPS) (Performed 12 months after initial AWV and PPPS )    I have reviewed the patient's medical history in detail and updated the computerized patient record. History     Past Medical History:   Diagnosis Date    Hypercholesterolemia     high in past / was on meds at that time but now off    MYRIAM (mycobacterium avium-intracellulare) infection ([facility] [address]     treated and released by Pulmonary      History reviewed. No pertinent surgical history. Current Outpatient Prescriptions   Medication Sig Dispense Refill    simvastatin (ZOCOR) 20 mg tablet TAKE 1 TAB BY MOUTH NIGHTLY. 90 Tab 3    amLODIPine (NORVASC) 5 mg tablet Take 1 Tab by mouth daily. 90 Tab 3    denosumab (PROLIA) 60 mg/mL injection Once every 6 months 1 mL 2    flaxseed oil 1,000 mg Cap Take  by mouth.  red yeast rice extract 600 mg Cap Take 600 mg by mouth now.  sertraline (ZOLOFT) 50 mg tablet Take 1 Tab by mouth daily. 90 Tab 1    CALCIUM PHOSPHATE TRIB/VIT D3 (CITRACAL + D PO) Take  by mouth.  LYCOPENE PO Take  by mouth. No Known Allergies  History reviewed. No pertinent family history. Social History   Substance Use Topics    Smoking status: Never Smoker    Smokeless tobacco: Never Used    Alcohol use No     Patient Active Problem List   Diagnosis Code    Hyperlipidemia E78.5    Essential hypertension, benign I10    Vitamin D deficiency E55.9    Advance directive discussed with patient Z71.89    Moderate single current episode of major depressive disorder ([facility] [address] F32.1    Bronchiectasis without complication ([facility] [address] J83.6    Age-related osteoporosis without current pathological fracture M81.0       Depression Risk Factor Screening:   No flowsheet data found. Alcohol Risk Factor Screening: On any occasion during the past 3 months, have you had more than 3 drinks containing alcohol? No    Do you average more than 7 drinks per week? No        Functional Ability and Level of Safety:     Hearing Loss   none    Activities of Daily Living   Self-care. Requires assistance with: no ADLs    Fall Risk   Fall Risk Assessment, last 12 mths 8/15/2017   Able to walk? Yes   Fall in past 12 months? No     Abuse Screen   Patient is not abused    Review of Systems   A comprehensive review of systems was negative except for that written in the HPI.     Physical Examination     Evaluation of Cognitive Function:  Mood/affect:  sad  Appearance: age appropriate  Family member/caregiver input: not applicable    See exam above    Patient Care Team:  Porter Recio MD as PCP - General (Internal Medicine)  Tessie Mccrary MD (Internal Medicine)  Jevon Benedict MD (Ophthalmology)  Emilee Pal RN as Ambulatory Care Navigator  Marek Mendez MD (Gastroenterology)  Haven Manrique MD (Unknown Physician Specialty)    Advice/Referrals/Counseling   Education and counseling provided:  End-of-Life planning (with patient's consent)  Pneumococcal Vaccine  Influenza Vaccine  Screening Mammography  Cardiovascular screening blood test  Bone mass measurement (DEXA)  Screening for glaucoma  Diabetes screening test      Assessment/Plan     Diagnoses and all orders for this visit:    1. Routine general medical examination at a health care facility-Refer to above for plan and to patient instructions for recommendations on HM  Flu shot and order mammo next visit    2. Screening for alcoholism-negative    3. Advanced Directive discussed with patient-see ACP note        RTC yearly for wellness visit.

## 2017-08-15 NOTE — MR AVS SNAPSHOT
Visit Information Date & Time Provider Department Dept. Phone Encounter #  
 8/15/2017 10:30 AM Lewis Campos MD [facility] [id number] Follow-up Instructions Return in about 3 months (around 11/15/2017) for follow up, flu vaccine. Upcoming Health Maintenance Date Due  
 MEDICARE YEARLY EXAM 7/29/2017 INFLUENZA AGE 9 TO ADULT 8/1/2017 BREAST CANCER SCRN MAMMOGRAM 11/10/2017 GLAUCOMA SCREENING Q2Y 6/26/2019 COLONOSCOPY 1/1/2021 DTaP/Tdap/Td series (2 - Td) 7/28/2026 Allergies as of 8/15/2017  Review Complete On: 8/15/2017 By: Lewis Campos MD  
 No Known Allergies Current Immunizations  Reviewed on 10/31/2016 Name Date Influenza High Dose Vaccine PF 10/31/2016 Influenza Vaccine 9/15/2014, 12/16/2013 Influenza Vaccine Split 12/4/2012 Pneumococcal Conjugate (PCV-13) 10/9/2015 Pneumococcal Polysaccharide (PPSV-23) 1/1/2005 Tdap 7/28/2016 Not reviewed this visit You Were Diagnosed With   
  
 Codes Comments Routine general medical examination at a health care facility    -  Primary ICD-10-CM: Z00.00 ICD-9-CM: V70.0 Screening for alcoholism     ICD-10-CM: Z13.89 ICD-9-CM: V79.1 Moderate single current episode of major depressive disorder (HCC)     ICD-10-CM: F32.1 ICD-9-CM: 296.22 Vitamin D deficiency     ICD-10-CM: E55.9 ICD-9-CM: 268.9 Essential hypertension, benign     ICD-10-CM: I10 
ICD-9-CM: 401.1 Hyperlipidemia, unspecified hyperlipidemia type     ICD-10-CM: E78.5 ICD-9-CM: 272.4 Vitals BP Pulse Temp Resp Height(growth percentile) Weight(growth percentile) 130/64 71 97 °F (36.1 °C) (Oral) 17 5' 1.5\" (1.562 m) 86 lb (39 kg) LMP SpO2 BMI OB Status Smoking Status (Exact Date) 94% 15.99 kg/m2 Postmenopausal Never Smoker Vitals History BMI and BSA Data Body Mass Index Body Surface Area  15.99 kg/m 2 1.3 m 2  
  
  
 Preferred Pharmacy Pharmacy Name Phone [facility], [address] [place] [address] [phone] Your Updated Medication List  
  
   
This list is accurate as of: 8/15/17 11:40 AM.  Always use your most recent med list. amLODIPine 5 mg tablet Commonly known as:  Unknown [place] Take 1 Tab by mouth daily. CITRACAL + D PO Take  by mouth. denosumab 60 mg/mL injection Commonly known as:  Deja Lauren Once every 6 months  
  
 flaxseed oil 1,000 mg Cap Take  by mouth. LYCOPENE PO Take  by mouth.  
  
 mirtazapine 30 mg tablet Commonly known as:  Fredia Greening Take 1 Tab by mouth daily. red yeast rice extract 600 mg Cap Take 600 mg by mouth now.  
  
 simvastatin 20 mg tablet Commonly known as:  ZOCOR  
TAKE 1 TAB BY MOUTH NIGHTLY. Follow-up Instructions Return in about 3 months (around 11/15/2017) for follow up, flu vaccine. To-Do List   
 08/15/2017 Lab:  CBC WITH AUTOMATED DIFF   
  
 08/15/2017 Lab:  LIPID PANEL   
  
 08/15/2017 Lab:  METABOLIC PANEL, COMPREHENSIVE   
  
 08/15/2017 Lab:  TSH 3RD GENERATION   
  
 08/15/2017 Lab:  VITAMIN D, 25 HYDROXY Patient Instructions Medicare Wellness Visit, Female The best way to live healthy is to have a healthy lifestyle by eating a well-balanced diet, exercising regularly, limiting alcohol and stopping smoking. Regular physical exams and screening tests are another way to keep healthy. Preventive exams provided by your health care provider can find health problems before they become diseases or illnesses. Preventive services including immunizations, screening tests, monitoring and exams can help you take care of your own health. All people over age 72 should have a pneumovax  and and a prevnar shot to prevent pneumonia. These are once in a lifetime unless you and your provider decide differently. All people over 65 should have a yearly flu shot and a tetanus vaccine every 10 years. A bone mass density to screen for osteoporosis or thinning of the bones should be done every 2 years after 65. Screening for diabetes mellitus with a blood sugar test should be done every year. Glaucoma is a disease of the eye due to increased ocular pressure that can lead to blindness and it should be done every year by an eye professional. 
 
Cardiovascular screening tests that check for elevated lipids (fatty part of blood) which can lead to heart disease and strokes should be done every 5 years. Colorectal screening that evaluates for blood or polyps in your colon should be done yearly as a stool test or every five years as a flexible sigmoidoscope or every 10 years as a colonoscopy up to age 76. Breast cancer screening with a mammogram is recommended biennially  for women age 54-69. Screening for cervical cancer with a pap smear and pelvic exam is recommended for women after age 72 years every 2 years up to age 79 or when the provider and patient decide to stop. If there is a history of cervical abnormalities or other increased risk for cancer then the test is recommended yearly. Hepatitis C screening is also recommended for anyone born between [birth year] through [address]. A shingles vaccine is also recommended once in a lifetime after age 61. Your Medicare Wellness Exam is recommended annually. Here is a list of your current Health Maintenance items with a due date: 
Health Maintenance Due Topic Date Due  
 Annual Well Visit  07/29/2017  Flu Vaccine  08/01/2017  Breast Cancer Screening  11/10/2017 [facility] & HEALTH SERVICES! Dear Josh Chand: 
Thank you for requesting a [employer] account. Our records indicate that you already have an active [employer] account. You can access your account anytime at [email or web address] Did you know that you can access your hospital and ER discharge instructions at any time in [employer]? You can also review all of your test results from your hospital stay or ER visit. Additional Information If you have questions, please visit the Frequently Asked Questions section of the [employer] website at [email or web address] Remember, [employer] is NOT to be used for urgent needs. For medical emergencies, dial 911. Now available from your iPhone and Android! Please provide this summary of care documentation to your next provider. Your primary care clinician is listed as Pavillion Crumble. If you have any questions after today's visit, please call [phone].

## 2017-08-16 ENCOUNTER — TELEPHONE (OUTPATIENT)
Dept: FAMILY MEDICINE CLINIC | Age: 77
End: 2017-08-16

## 2017-08-16 DIAGNOSIS — F32.1 MODERATE SINGLE CURRENT EPISODE OF MAJOR DEPRESSIVE DISORDER (HCC): Primary | ICD-10-CM

## 2017-08-16 LAB — 25(OH)D3 SERPL-MCNC: 31.9 NG/ML (ref 30–100)

## 2017-08-16 RX ORDER — SERTRALINE HYDROCHLORIDE 50 MG/1
50 TABLET, FILM COATED ORAL DAILY
Qty: 90 TAB | Refills: 1 | Status: SHIPPED | OUTPATIENT
Start: 2017-08-16 | End: 2017-11-01 | Stop reason: SINTOL

## 2017-08-16 NOTE — TELEPHONE ENCOUNTER
Spoke with Taylor-Try Boost or Ensure once to twice a day  Will discontinue Mirtazapine and try Zoloft-rx sent

## 2017-08-16 NOTE — TELEPHONE ENCOUNTER
Ms dumont calling, saying she was expecting a call concerning her aunt and the problem you were concerned with.  Can be reached at [phone]

## 2017-11-01 ENCOUNTER — OFFICE VISIT (OUTPATIENT)
Dept: FAMILY MEDICINE CLINIC | Age: 77
End: 2017-11-01

## 2017-11-01 VITALS — SYSTOLIC BLOOD PRESSURE: 140 MMHG | DIASTOLIC BLOOD PRESSURE: 64 MMHG

## 2017-11-01 DIAGNOSIS — M81.0 AGE-RELATED OSTEOPOROSIS WITHOUT CURRENT PATHOLOGICAL FRACTURE: ICD-10-CM

## 2017-11-01 DIAGNOSIS — Z23 ENCOUNTER FOR IMMUNIZATION: ICD-10-CM

## 2017-11-01 DIAGNOSIS — F32.1 MODERATE SINGLE CURRENT EPISODE OF MAJOR DEPRESSIVE DISORDER (HCC): ICD-10-CM

## 2017-11-01 DIAGNOSIS — I10 ESSENTIAL HYPERTENSION, BENIGN: Primary | ICD-10-CM

## 2017-11-01 DIAGNOSIS — R63.6 PATIENT UNDERWEIGHT: ICD-10-CM

## 2017-11-01 DIAGNOSIS — E78.5 HYPERLIPIDEMIA, UNSPECIFIED HYPERLIPIDEMIA TYPE: ICD-10-CM

## 2017-11-01 DIAGNOSIS — E55.9 VITAMIN D DEFICIENCY: ICD-10-CM

## 2017-11-01 RX ORDER — MIRTAZAPINE 30 MG/1
30 TABLET, FILM COATED ORAL
Qty: 90 TAB | Refills: 1 | Status: SHIPPED | OUTPATIENT
Start: 2017-11-01 | End: 2017-11-01 | Stop reason: SDUPTHER

## 2017-11-01 RX ORDER — MIRTAZAPINE 15 MG/1
15 TABLET, FILM COATED ORAL
Qty: 90 TAB | Refills: 3 | Status: SHIPPED | OUTPATIENT
Start: 2017-11-01 | End: 2017-11-01 | Stop reason: CLARIF

## 2017-11-01 RX ORDER — MIRTAZAPINE 30 MG/1
30 TABLET, FILM COATED ORAL
Qty: 90 TAB | Refills: 1 | Status: SHIPPED | OUTPATIENT
Start: 2017-11-01 | End: 2018-08-17 | Stop reason: SDUPTHER

## 2017-11-01 NOTE — PATIENT INSTRUCTIONS

## 2017-11-01 NOTE — PROGRESS NOTES
Chief Complaint   Patient presents with    Follow Up Chronic Condition     Blood pressure    Immunization/Injection     Influenza     1. Have you been to the ER, urgent care clinic since your last visit? Hospitalized since your last visit? No    2. Have you seen or consulted any other health care providers outside of the [address] [place] [address] since your last visit? Include any pap smears or colon screening. Eye visit Dr. Jignesh Paulino in July.

## 2017-11-01 NOTE — PROGRESS NOTES
Chief Complaint   Patient presents with    Follow Up Chronic Condition     Blood pressure    Immunization/Injection     Influenza       Pt is a 65y.o. year old female who presents for follow up of her chronic medical problems    Mammo and Dexa due  Has been on Prolia-once; next in December so niece can take her      BP Readings from Last 3 Encounters:   11/01/17 140/64   08/15/17 130/64   05/01/17 119/61       Wt Readings from Last 3 Encounters:   11/01/17 (P) 86 lb (39 kg)   08/15/17 86 lb (39 kg)   05/01/17 88 lb (39.9 kg)       Lab Results   Component Value Date/Time    Cholesterol, total 173 08/15/2017 11:43 AM    HDL Cholesterol 97 08/15/2017 11:43 AM    LDL, calculated 65.6 08/15/2017 11:43 AM    VLDL, calculated 10.4 08/15/2017 11:43 AM    Triglyceride 52 08/15/2017 11:43 AM    CHOL/HDL Ratio 1.8 08/15/2017 11:43 AM   Compliant with Zocor    She stopped the Zoloft-abdominal pain and diarrhea; wants to get back on the [employer]      Health Maintenance Due   Topic Date Due    INFLUENZA AGE 9 TO ADULT  08/01/2017    BREAST CANCER SCRN MAMMOGRAM  11/10/2017     ROS:    Pt denies: Wt loss, Fever/Chills, HA, Visual changes, Fatigue, Chest pain, SOB, KUMAR, Abd pain, N/V/D/C, Blood in stool or urine, Edema. Pertinent positive as above in HPI.  All others were negative    Patient Active Problem List   Diagnosis Code    Hyperlipidemia E78.5    Essential hypertension, benign I10    Vitamin D deficiency E55.9    Advance directive discussed with patient Z70.80    Moderate single current episode of major depressive disorder ([facility] F32.1    Age-related osteoporosis without current pathological fracture M81.0    Patient underweight R63.6       Past Medical History:   Diagnosis Date    Hypercholesterolemia     high in past / was on meds at that time but now off    MYRIAM (mycobacterium avium-intracellulare) infection ([facility] [address] [facility]     treated and released by Pulmonary       Current Outpatient Prescriptions   Medication Sig Dispense Refill    mirtazapine (REMERON) 30 mg tablet Take 1 Tab by mouth nightly. 90 Tab 1    simvastatin (ZOCOR) 20 mg tablet TAKE 1 TAB BY MOUTH NIGHTLY. 90 Tab 3    amLODIPine (NORVASC) 5 mg tablet Take 1 Tab by mouth daily. 90 Tab 3    denosumab (PROLIA) 60 mg/mL injection Once every 6 months 1 mL 2    CALCIUM PHOSPHATE TRIB/VIT D3 (CITRACAL + D PO) Take  by mouth.  flaxseed oil 1,000 mg Cap Take  by mouth.  red yeast rice extract 600 mg Cap Take 600 mg by mouth now.  LYCOPENE PO Take  by mouth. History   Smoking Status    Never Smoker   Smokeless Tobacco    Never Used       No Known Allergies    Patient Labs were reviewed: yes      Patient Past Records were reviewed:  yes        Objective:     Vitals:    11/01/17 0955   BP: 140/64   Pulse: (P) 80   Resp: (P) 16   Temp: (P) 95.9 °F (35.5 °C)   SpO2: (P) 95%   Weight: (P) 86 lb (39 kg)   Height: (P) 5' 1\" (1.549 m)     Body mass index is 16.25 kg/(m^2) (pended). Exam:   Appearance: alert, well appearing,  oriented to person, place, and time, acyanotic, in no respiratory distress and well hydrated. HEENT:  NC/AT, pink conj, anicteric sclerae  Neck:  No cervical lymphadenopathy, no JVD, no thyromegaly, no carotid bruit  Heart:  RRR without M/R/G  Lungs:  CTAB, no rhonchi, rales, or wheezes with good air exchange   Abdomen:  Non-tender, pos bowel sounds, no hepatosplenomegaly  Ext:  No C/C/E    Skin: no rash  Neuro: no lateralizing signs, CNs II-XII intact      Assessment/ Plan:   Diagnoses and all orders for this visit:    1. Essential hypertension, benign-controlled, continue with present med, Norvasc    2. Hyperlipidemia, unspecified hyperlipidemia type-at goal on Zocor    3. Age-related osteoporosis without current pathological fracture-on Prolia, Ca+D, next dose in December; will check Dexa next year after 1 yr on the Prolia; advised to walk for exercise    4.  Moderate single current episode of major depressive disorder (HCC)-had side effects on Zocor  -     mirtazapine (REMERON) 30 mg tablet; Take 1 Tab by mouth nightly. 5. Vitamin D deficiency-s/p rx, continue with daily Ca+D    6. Encounter for immunization  -     Influenza virus vaccine ([address]) 72 years and older ([address])  -     Administration fee () for Medicare insured patients    7. Patient underweight-encouraged to drink Boost 1-2x a day at least as a supplement        Follow-up Disposition:  Return in about 3 months (around 2/1/2018) for follow up. I have discussed the diagnosis with the patient and the intended plan as seen in the above orders. The patient has received an After-Visit Summary and questions were answered concerning future plans. Medication Side Effects and Warnings were discussed with patient: yes    Patient verbalized understanding of above instructions.     Chidi Varghese MD  Internal Medicine  [facility]

## 2017-11-01 NOTE — MR AVS SNAPSHOT
Visit Information Date & Time Provider Department Dept. Phone Encounter #  
 11/1/2017 10:00 AM Lidia Miramontes MD [facility] 13 [id number] Follow-up Instructions Return in about 3 months (around 2/1/2018) for follow up. Routing History Upcoming Health Maintenance Date Due INFLUENZA AGE 9 TO ADULT 8/1/2017 BREAST CANCER SCRN MAMMOGRAM 11/10/2017 MEDICARE YEARLY EXAM 8/16/2018 GLAUCOMA SCREENING Q2Y 6/26/2019 COLONOSCOPY 1/1/2021 DTaP/Tdap/Td series (2 - Td) 7/28/2026 Allergies as of 11/1/2017  Review Complete On: 11/1/2017 By: Lidia Miramontes MD  
 No Known Allergies Current Immunizations  Reviewed on 10/31/2016 Name Date Influenza High Dose Vaccine PF  Incomplete, 10/31/2016 Influenza Vaccine 9/15/2014, 12/16/2013 Influenza Vaccine Split 12/4/2012 Pneumococcal Conjugate (PCV-13) 10/9/2015 Pneumococcal Polysaccharide (PPSV-23) 1/1/2005 Tdap 7/28/2016 Not reviewed this visit You Were Diagnosed With   
  
 Codes Comments Essential hypertension, benign    -  Primary ICD-10-CM: I10 
ICD-9-CM: 401.1 Hyperlipidemia, unspecified hyperlipidemia type     ICD-10-CM: E78.5 ICD-9-CM: 272.4 Age-related osteoporosis without current pathological fracture     ICD-10-CM: M81.0 ICD-9-CM: 733.01 Moderate single current episode of major depressive disorder (HCC)     ICD-10-CM: F32.1 ICD-9-CM: 296.22 Vitamin D deficiency     ICD-10-CM: E55.9 ICD-9-CM: 268.9 Encounter for immunization     ICD-10-CM: A46 ICD-9-CM: V03.89 Patient underweight     ICD-10-CM: R63.6 ICD-9-CM: 783.22 Vitals BP Pulse Temp Resp Height(growth percentile) Weight(growth percentile) 140/64 (P) 80 (P) 95.9 °F (35.5 °C) (P) 16 (P) 5' 1\" (1.549 m) (P) 86 lb (39 kg) LMP SpO2 BMI OB Status Smoking Status (Exact Date) (P) 95% (P) 16.25 kg/m2 Postmenopausal Never Smoker Vitals History BMI and BSA Data Body Mass Index Body Surface Area (P) 16.25 kg/m 2 (P) 1.3 m 2 Preferred Pharmacy Pharmacy Name Phone [facility], [address] [place] [address] [phone] Your Updated Medication List  
  
   
This list is accurate as of: 11/1/17 10:43 AM.  Always use your most recent med list. amLODIPine 5 mg tablet Commonly known as:  [place] Take 1 Tab by mouth daily. CITRACAL + D PO Take  by mouth. denosumab 60 mg/mL injection Commonly known as:  [place] Once every 6 months  
  
 flaxseed oil 1,000 mg Cap Take  by mouth. LYCOPENE PO Take  by mouth.  
  
 mirtazapine 15 mg tablet Commonly known as:  Moosic Carroll Take 1 Tab by mouth nightly. red yeast rice extract 600 mg Cap Take 600 mg by mouth now.  
  
 simvastatin 20 mg tablet Commonly known as:  ZOCOR  
TAKE 1 TAB BY MOUTH NIGHTLY. Prescriptions Printed Refills  
 mirtazapine (REMERON) 15 mg tablet 3 Sig: Take 1 Tab by mouth nightly. Class: Print Route: Oral  
  
We Performed the Following ADMIN INFLUENZA VIRUS VAC [ [facility] [place]] INFLUENZA VIRUS VACCINE, HIGH DOSE SEASONAL, PRESERVATIVE FREE [94971 CPT(R)] Follow-up Instructions Return in about 3 months (around 2/1/2018) for follow up. Patient Instructions Influenza (Flu) Vaccine (Inactivated or Recombinant): What You Need to Know Why get vaccinated? Influenza (\"flu\") is a contagious disease that spreads around the [place] every winter, usually between October and May. Flu is caused by influenza viruses and is spread mainly by coughing, sneezing, and close contact. Anyone can get flu. Flu strikes suddenly and can last several days. Symptoms vary by age, but can include: · Fever/chills. · Sore throat. · Muscle aches. · Fatigue. · Cough. · Headache. · Runny or stuffy nose. Flu can also lead to pneumonia and blood infections, and cause diarrhea and seizures in children. If you have a medical condition, such as heart or lung disease, flu can make it worse. Flu is more dangerous for some people. Infants and young children, people 72years of age and older, pregnant women, and people with certain health conditions or a weakened immune system are at greatest risk. Each year thousands of people in the [facility] die from flu, and many more are hospitalized. Flu vaccine can: · Keep you from getting flu. · Make flu less severe if you do get it. · Keep you from spreading flu to your family and other people. Inactivated and recombinant flu vaccines A dose of flu vaccine is recommended every flu season. Children 6 months through 6years of age may need two doses during the same flu season. Everyone else needs only one dose each flu season. Some inactivated flu vaccines contain a very small amount of a mercury-based preservative called thimerosal. Studies have not shown thimerosal in vaccines to be harmful, but flu vaccines that do not contain thimerosal are available. There is no live flu virus in flu shots. They cannot cause the flu. There are many flu viruses, and they are always changing. Each year a new flu vaccine is made to protect against three or four viruses that are likely to cause disease in the upcoming flu season. But even when the vaccine doesn't exactly match these viruses, it may still provide some protection. Flu vaccine cannot prevent: · Flu that is caused by a virus not covered by the vaccine. · Illnesses that look like flu but are not. Some people should not get this vaccine Tell the person who is giving you the vaccine: · If you have any severe (life-threatening) allergies.  If you ever had a life-threatening allergic reaction after a dose of flu vaccine, or have a severe allergy to any part of this vaccine, you may be advised not to get vaccinated. Most, but not all, types of flu vaccine contain a small amount of egg protein. · If you ever had Guillain-Barré syndrome (also called GBS) Some people with a history of GBS should not get this vaccine. This should be discussed with your doctor. · If you are not feeling well. It is usually okay to get flu vaccine when you have a mild illness, but you might be asked to come back when you feel better. Risks of a vaccine reaction With any medicine, including vaccines, there is a chance of reactions. These are usually mild and go away on their own, but serious reactions are also possible. Most people who get a flu shot do not have any problems with it. Minor problems following a flu shot include: · Soreness, redness, or swelling where the shot was given · Hoarseness · Sore, red or itchy eyes · Cough · Fever · Aches · Headache · Itching · Fatigue If these problems occur, they usually begin soon after the shot and last 1 or 2 days. More serious problems following a flu shot can include the following: · There may be a small increased risk of Guillain-Barré Syndrome (GBS) after inactivated flu vaccine. This risk has been estimated at 1 or 2 additional cases per million people vaccinated. This is much lower than the risk of severe complications from flu, which can be prevented by flu vaccine. · [place] children who get the flu shot along with pneumococcal vaccine (PCV13) and/or DTaP vaccine at the same time might be slightly more likely to have a seizure caused by fever. Ask your doctor for more information. Tell your doctor if a child who is getting flu vaccine has ever had a seizure Problems that could happen after any injected vaccine: · People sometimes faint after a medical procedure, including vaccination. Sitting or lying down for about 15 minutes can help prevent fainting, and injuries caused by a fall. Tell your doctor if you feel dizzy, or have vision changes or ringing in the ears. · Some people get severe pain in the shoulder and have difficulty moving the arm where a shot was given. This happens very rarely. · Any medication can cause a severe allergic reaction. Such reactions from a vaccine are very rare, estimated at about 1 in a million doses, and would happen within a few minutes to a few hours after the vaccination. As with any medicine, there is a very remote chance of a vaccine causing a serious injury or death. The safety of vaccines is always being monitored. For more information, visit: www.cdc.gov/vaccinesafety/. What if there is a serious reaction? What should I look for? · Look for anything that concerns you, such as signs of a severe allergic reaction, very high fever, or unusual behavior. Signs of a severe allergic reaction can include hives, swelling of the face and throat, difficulty breathing, a fast heartbeat, dizziness, and weakness - usually within a few minutes to a few hours after the vaccination. What should I do? · If you think it is a severe allergic reaction or other emergency that can't wait, call 9-1-1 and get the person to the nearest hospital. Otherwise, call your doctor. · Reactions should be reported to the \"Vaccine Adverse Event Reporting System\" (VAERS). Your doctor should file this report, or you can do it yourself through the VAERS website at www.vaers. hhs.gov, or by calling 3-693.447.7521. VAERS does not give medical advice. The National Vaccine Injury Compensation Program 
The National Vaccine Injury Compensation Program (VICP) is a federal program that was created to compensate people who may have been injured by certain vaccines. Persons who believe they may have been injured by a vaccine can learn about the program and about filing a claim by calling 0-723.198.5349 or visiting the [employer] website at www.[facility].gov/vaccinecompensation. There is a time limit to file a claim for compensation. How can I learn more? · Ask your healthcare provider. He or she can give you the vaccine package insert or suggest other sources of information. · Call your local or state health department. · Contact the Centers for Disease Control and Prevention (CDC): 
¨ Call 8-682.510.2483 (1-800-CDC-INFO) or ¨ Visit CDC's website at www.cdc.gov/flu Vaccine Information Statement Inactivated Influenza Vaccine 8/7/2015) 42 VAL Mace 224OY-41 [facility] and "[employer] Centers for Disease Control and Prevention Many Vaccine Information Statements are available in [place] and other languages. See www.immunize.org/vis. Muchas hojas de información sobre vacunas están disponibles en español y en otros idiomas. Visite www.immunize.org/vis. Care instructions adapted under license by [employer] (which disclaims liability or warranty for this information). If you have questions about a medical condition or this instruction, always ask your healthcare professional. [address] any warranty or liability for your use of this information. Introducing [facility] & HEALTH SERVICES! Dear aKtelyn Chester: 
Thank you for requesting a [employer] account. Our records indicate that you already have an active [employer] account. You can access your account anytime at [email or web address] Did you know that you can access your hospital and ER discharge instructions at any time in [employer]? You can also review all of your test results from your hospital stay or ER visit. Additional Information If you have questions, please visit the Frequently Asked Questions section of the [employer] website at [email or web address] Remember, [employer] is NOT to be used for urgent needs. For medical emergencies, dial 911. Now available from your iPhone and Android! Please provide this summary of care documentation to your next provider. Your primary care clinician is listed as Arianna Kumar. If you have any questions after today's visit, please call [phone].

## 2017-12-12 ENCOUNTER — TELEPHONE (OUTPATIENT)
Dept: FAMILY MEDICINE CLINIC | Age: 77
End: 2017-12-12

## 2017-12-12 DIAGNOSIS — M81.0 AGE-RELATED OSTEOPOROSIS WITHOUT CURRENT PATHOLOGICAL FRACTURE: Primary | ICD-10-CM

## 2017-12-12 NOTE — TELEPHONE ENCOUNTER
Mark Anthony arredondo calling because she sent an email and has not heard a response but her aunt is needing a prolia shot and armando is saying  that she needs an order and labs labs. She is wanting to know if she needs to be seen or you can just put in the labs that she needs done.  Would appreciate a call today can be reached at [phone]

## 2017-12-13 ENCOUNTER — HOSPITAL ENCOUNTER (OUTPATIENT)
Dept: LAB | Age: 77
Discharge: HOME | End: 2017-12-13
Payer: MEDICARE

## 2017-12-13 DIAGNOSIS — M81.0 AGE-RELATED OSTEOPOROSIS WITHOUT CURRENT PATHOLOGICAL FRACTURE: ICD-10-CM

## 2017-12-13 LAB
ANION GAP SERPL CALC-SCNC: 7 MMOL/L (ref 3–18)
BUN SERPL-MCNC: 14 MG/DL (ref 7–18)
BUN/CREAT SERPL: 19 (ref 12–20)
CALCIUM SERPL-MCNC: 9.5 MG/DL (ref 8.5–10.1)
CHLORIDE SERPL-SCNC: 102 MMOL/L (ref 100–108)
CO2 SERPL-SCNC: 30 MMOL/L (ref 21–32)
CREAT SERPL-MCNC: 0.74 MG/DL (ref 0.6–1.3)
GLUCOSE SERPL-MCNC: 81 MG/DL (ref 74–99)
POTASSIUM SERPL-SCNC: 4.3 MMOL/L (ref 3.5–5.5)
SODIUM SERPL-SCNC: 139 MMOL/L (ref 136–145)

## 2017-12-13 PROCEDURE — 36415 COLL VENOUS BLD VENIPUNCTURE: CPT | Performed by: INTERNAL MEDICINE

## 2017-12-13 PROCEDURE — 80048 BASIC METABOLIC PNL TOTAL CA: CPT | Performed by: INTERNAL MEDICINE

## 2017-12-13 NOTE — TELEPHONE ENCOUNTER
Radha Duran checking to make sure that when lab results are received that the results and order for prolia will be sent to [facility]. Please call to let her know that it was done.  Phone is [phone]

## 2017-12-19 DIAGNOSIS — M81.0 AGE-RELATED OSTEOPOROSIS WITHOUT CURRENT PATHOLOGICAL FRACTURE: Primary | ICD-10-CM

## 2017-12-19 NOTE — TELEPHONE ENCOUNTER
----- Message from Marie Johnson MD sent at 12/14/2017  8:43 AM EST -----  Calcium level is normal, may get her Prolia

## 2017-12-19 NOTE — TELEPHONE ENCOUNTER
Patient's niece notified of lab results and that Prolia order has been sent to [facility] for scheduling injection.

## 2017-12-29 NOTE — PROGRESS NOTES
Order faxed to [phone] for scheduling. Patient's niece also notified of this so that she may call to schedule.

## 2018-02-01 ENCOUNTER — OFFICE VISIT (OUTPATIENT)
Dept: FAMILY MEDICINE CLINIC | Age: 78
End: 2018-02-01

## 2018-02-01 VITALS
RESPIRATION RATE: 17 BRPM | HEIGHT: 61 IN | BODY MASS INDEX: 16.62 KG/M2 | TEMPERATURE: 96.4 F | WEIGHT: 88 LBS | OXYGEN SATURATION: 94 % | DIASTOLIC BLOOD PRESSURE: 68 MMHG | HEART RATE: 84 BPM | SYSTOLIC BLOOD PRESSURE: 130 MMHG

## 2018-02-01 DIAGNOSIS — J01.90 ACUTE NON-RECURRENT SINUSITIS, UNSPECIFIED LOCATION: ICD-10-CM

## 2018-02-01 DIAGNOSIS — E78.5 HYPERLIPIDEMIA, UNSPECIFIED HYPERLIPIDEMIA TYPE: ICD-10-CM

## 2018-02-01 DIAGNOSIS — E55.9 VITAMIN D DEFICIENCY: ICD-10-CM

## 2018-02-01 DIAGNOSIS — Z71.89 ADVANCE DIRECTIVE DISCUSSED WITH PATIENT: ICD-10-CM

## 2018-02-01 DIAGNOSIS — F32.1 MODERATE SINGLE CURRENT EPISODE OF MAJOR DEPRESSIVE DISORDER (HCC): ICD-10-CM

## 2018-02-01 DIAGNOSIS — M81.0 AGE-RELATED OSTEOPOROSIS WITHOUT CURRENT PATHOLOGICAL FRACTURE: ICD-10-CM

## 2018-02-01 DIAGNOSIS — I10 ESSENTIAL HYPERTENSION, BENIGN: Primary | ICD-10-CM

## 2018-02-01 RX ORDER — AZITHROMYCIN 250 MG/1
TABLET, FILM COATED ORAL
Qty: 6 TAB | Refills: 0 | Status: SHIPPED | OUTPATIENT
Start: 2018-02-01 | End: 2018-02-06

## 2018-02-01 NOTE — PROGRESS NOTES
Chief Complaint   Patient presents with    Follow Up Chronic Condition     1. Have you been to the ER, urgent care clinic since your last visit? Hospitalized since your last visit? No    2. Have you seen or consulted any other health care providers outside of the [address] [place] [address] since your last visit? Include any pap smears or colon screening.  Yes Where: [facility] Reason for visit: Prolia Injection 1/8/2018

## 2018-02-01 NOTE — PATIENT INSTRUCTIONS
Sinusitis: Care Instructions  Your Care Instructions    Sinusitis is an infection of the lining of the sinus cavities in your head. Sinusitis often follows a cold. It causes pain and pressure in your head and face. In most cases, sinusitis gets better on its own in 1 to 2 weeks. But some mild symptoms may last for several weeks. Sometimes antibiotics are needed. Follow-up care is a key part of your treatment and safety. Be sure to make and go to all appointments, and call your doctor if you are having problems. It's also a good idea to know your test results and keep a list of the medicines you take. How can you care for yourself at home? · Take an over-the-counter pain medicine, such as acetaminophen (Tylenol), ibuprofen (Advil, Motrin), or naproxen (Aleve). Read and follow all instructions on the label. · If the doctor prescribed antibiotics, take them as directed. Do not stop taking them just because you feel better. You need to take the full course of antibiotics. · Be careful when taking over-the-counter cold or flu medicines and Tylenol at the same time. Many of these medicines have acetaminophen, which is Tylenol. Read the labels to make sure that you are not taking more than the recommended dose. Too much acetaminophen (Tylenol) can be harmful. · Breathe warm, moist air from a steamy shower, a hot bath, or a sink filled with hot water. Avoid cold, dry air. Using a humidifier in your home may help. Follow the directions for cleaning the machine. · Use saline (saltwater) nasal washes to help keep your nasal passages open and wash out mucus and bacteria. You can buy saline nose drops at a grocery store or drugstore. Or you can make your own at home by adding 1 teaspoon of salt and 1 teaspoon of baking soda to 2 cups of distilled water. If you make your own, fill a bulb syringe with the solution, insert the tip into your nostril, and squeeze gently. Jackie Ruff your nose.   · Put a hot, wet towel or a warm gel pack on your face 3 or 4 times a day for 5 to 10 minutes each time. · Try a decongestant nasal spray like oxymetazoline (Afrin). Do not use it for more than 3 days in a row. Using it for more than 3 days can make your congestion worse. When should you call for help? Call your doctor now or seek immediate medical care if:  ? · You have new or worse swelling or redness in your face or around your eyes. ? · You have a new or higher fever. ? Watch closely for changes in your health, and be sure to contact your doctor if:  ? · You have new or worse facial pain. ? · The mucus from your nose becomes thicker (like pus) or has new blood in it. ? · You are not getting better as expected. Where can you learn more? Go to http://mandy-rossy.info/. Enter T734 in the search box to learn more about \"Sinusitis: Care Instructions. \"  Current as of: May 12, 2017  Content Version: 11.4  © 4338-1203 Healthwise, Incorporated. Care instructions adapted under license by [facility] (which disclaims liability or warranty for this information). If you have questions about a medical condition or this instruction, always ask your healthcare professional. [address] any warranty or liability for your use of this information.

## 2018-02-01 NOTE — MR AVS SNAPSHOT
Deni Rodriguez [place] [id number] [address] [phone] 
[phone] Patient: Silvina Orlando MRN: [id number] [facility]:[birth year]75 Visit Information Date & Time Provider Department Dept. Phone Encounter #  
 2/1/2018 10:00 AM Dave Lewis MD [address] [facility] [phone] [id number] Follow-up Instructions Return in about 3 months (around 5/1/2018) for follow up. Your Appointments 8/22/2018 10:30 AM  
Office Visit with Dave Lewis MD  
[place] [address] ([facility]) Appt Note: [address] [facility] [address] [facility] [address] [facility]  
  
   
 [address] [place] [address] [address] Upcoming Health Maintenance Date Due  
 MEDICARE YEARLY EXAM 8/16/2018 BREAST CANCER SCRN MAMMOGRAM 11/15/2018 GLAUCOMA SCREENING Q2Y 6/26/2019 COLONOSCOPY 11/3/2021 DTaP/Tdap/Td series (2 - Td) 7/28/2026 Allergies as of 2/1/2018  Review Complete On: 2/1/2018 By: Dave Lewis MD  
 No Known Allergies Current Immunizations  Reviewed on 10/31/2016 Name Date Influenza High Dose Vaccine PF 11/1/2017, 10/31/2016 Influenza Vaccine 9/15/2014, 12/16/2013 Influenza Vaccine Split 12/4/2012 Pneumococcal Conjugate (PCV-13) 10/9/2015 Pneumococcal Polysaccharide (PPSV-23) 1/1/2005 Tdap 7/28/2016 Not reviewed this visit You Were Diagnosed With   
  
 Codes Comments Essential hypertension, benign    -  Primary ICD-10-CM: I10 
ICD-9-CM: 401.1 Hyperlipidemia, unspecified hyperlipidemia type     ICD-10-CM: E78.5 ICD-9-CM: 272.4 Age-related osteoporosis without current pathological fracture     ICD-10-CM: M81.0 ICD-9-CM: 733.01 Moderate single current episode of major depressive disorder (HCC)     ICD-10-CM: F32.1 ICD-9-CM: 296.22 Vitamin D deficiency     ICD-10-CM: E55.9 ICD-9-CM: 268.9 Advance directive discussed with patient     ICD-10-CM: Z71.89 ICD-9-CM: V65.49 Acute non-recurrent sinusitis, unspecified location     ICD-10-CM: J01.90 ICD-9-CM: 461.9 Vitals BP Pulse Temp Resp Height(growth percentile) Weight(growth percentile) 130/68 84 96.4 °F (35.8 °C) (Oral) 17 5' 1\" (1.549 m) 88 lb (39.9 kg) SpO2 BMI OB Status Smoking Status 94% 16.63 kg/m2 Postmenopausal Never Smoker Vitals History BMI and BSA Data Body Mass Index Body Surface Area  
 16.63 kg/m 2 1.31 m 2 Preferred Pharmacy Pharmacy Name Phone [facility] ([email or web address]) [facility], [address] [place] [address] [phone] Your Updated Medication List  
  
   
This list is accurate as of: 2/1/18 10:54 AM.  Always use your most recent med list. amLODIPine 5 mg tablet Commonly known as:  Saintclair Rickers Take 1 Tab by mouth daily. azithromycin 250 mg tablet Commonly known as:  Albert [place] Take 2 tablets today, then take 1 tablet daily CITRACAL + D PO Take  by mouth. denosumab 60 mg/mL injection Commonly known as:  Vern Aas Once every 6 months  
  
 flaxseed oil 1,000 mg Cap Take  by mouth. LYCOPENE PO Take  by mouth.  
  
 mirtazapine 30 mg tablet Commonly known as:  [place] Take 1 Tab by mouth nightly. red yeast rice extract 600 mg Cap Take 600 mg by mouth now.  
  
 simvastatin 20 mg tablet Commonly known as:  ZOCOR  
TAKE 1 TAB BY MOUTH NIGHTLY. Prescriptions Sent to Pharmacy Refills  
 azithromycin (ZITHROMAX) 250 mg tablet 0 Sig: Take 2 tablets today, then take 1 tablet daily Class: Normal  
 Pharmacy: [place] [address] [address] [facility], [address] [place] [address] Ph #: [phone] Follow-up Instructions Return in about 3 months (around 5/1/2018) for follow up. Patient Instructions Sinusitis: Care Instructions Your Care Instructions Sinusitis is an infection of the lining of the sinus cavities in your head. Sinusitis often follows a cold. It causes pain and pressure in your head and face. In most cases, sinusitis gets better on its own in 1 to 2 weeks. But some mild symptoms may last for several weeks. Sometimes antibiotics are needed. Follow-up care is a key part of your treatment and safety. Be sure to make and go to all appointments, and call your doctor if you are having problems. It's also a good idea to know your test results and keep a list of the medicines you take. How can you care for yourself at home? · Take an over-the-counter pain medicine, such as acetaminophen (Tylenol), ibuprofen (Advil, Motrin), or naproxen (Aleve). Read and follow all instructions on the label. · If the doctor prescribed antibiotics, take them as directed. Do not stop taking them just because you feel better. You need to take the full course of antibiotics. · Be careful when taking over-the-counter cold or flu medicines and Tylenol at the same time. Many of these medicines have acetaminophen, which is Tylenol. Read the labels to make sure that you are not taking more than the recommended dose. Too much acetaminophen (Tylenol) can be harmful. · Breathe warm, moist air from a steamy shower, a hot bath, or a sink filled with hot water. Avoid cold, dry air. Using a humidifier in your home may help. Follow the directions for cleaning the machine. · Use saline (saltwater) nasal washes to help keep your nasal passages open and wash out mucus and bacteria. You can buy saline nose drops at a grocery store or drugstore. Or you can make your own at home by adding 1 teaspoon of salt and 1 teaspoon of baking soda to 2 cups of distilled water. If you make your own, fill a bulb syringe with the solution, insert the tip into your nostril, and squeeze gently. Julia Yamilka your nose.  
· Put a hot, wet towel or a warm gel pack on your face 3 or 4 times a day for 5 to 10 minutes each time. · Try a decongestant nasal spray like oxymetazoline (Afrin). Do not use it for more than 3 days in a row. Using it for more than 3 days can make your congestion worse. When should you call for help? Call your doctor now or seek immediate medical care if: 
? · You have new or worse swelling or redness in your face or around your eyes. ? · You have a new or higher fever. ? Watch closely for changes in your health, and be sure to contact your doctor if: 
? · You have new or worse facial pain. ? · The mucus from your nose becomes thicker (like pus) or has new blood in it. ? · You are not getting better as expected. Where can you learn more? Go to http://mandy-rossy.info/. Enter S927 in the search box to learn more about \"Sinusitis: Care Instructions. \" Current as of: May 12, 2017 Content Version: 11.4 © 7217-2978 [employer]. Care instructions adapted under license by [employer] (which disclaims liability or warranty for this information). If you have questions about a medical condition or this instruction, always ask your healthcare professional. [place] [address] any warranty or liability for your use of this information. Introducing [facility]! Dear Jairo Prescott: 
Thank you for requesting a ShoutWire account. Our records indicate that you already have an active ShoutWire account. You can access your account anytime at [email or web address] Did you know that you can access your hospital and ER discharge instructions at any time in ShoutWire? You can also review all of your test results from your hospital stay or ER visit. Additional Information If you have questions, please visit the Frequently Asked Questions section of the ShoutWire website at [email or web address] Remember, ShoutWire is NOT to be used for urgent needs. For medical emergencies, dial 911. Now available from your iPhone and Android! Please provide this summary of care documentation to your next provider. Your primary care clinician is listed as Coty Kline. If you have any questions after today's visit, please call [phone].

## 2018-02-01 NOTE — ACP (ADVANCE CARE PLANNING)
Advance Care Planning (ACP) Provider Conversation Snapshot    Date of ACP Conversation: 02/01/18  Persons included in Conversation:  patient  Length of ACP Conversation in minutes:  <16 minutes (Non-Billable)    Authorized Decision Maker (if patient is incapable of making informed decisions):    This person is:   her son Emilie Tejada          For Patients with Decision Making Capacity:   Values/Goals: Exploration of values, goals, and preferences if recovery is not expected, even with continued medical treatment in the event of:  Imminent death  Severe, permanent brain injury    Conversation Outcomes / Follow-Up Plan:   Recommended completion of Advance Directive form after review of ACP materials and conversation with prospective healthcare agent

## 2018-02-01 NOTE — PROGRESS NOTES
Chief Complaint   Patient presents with    Follow Up Chronic Condition       Pt is a 65y.o. year old female who presents for follow up of her chronic medical problems    Wt Readings from Last 3 Encounters:   02/01/18 88 lb (39.9 kg)   11/01/17 (P) 86 lb (39 kg)   08/15/17 86 lb (39 kg)       BP Readings from Last 3 Encounters:   02/01/18 147/65   11/01/17 140/64   08/15/17 130/64   Repeat BP-better    No recent Dexa; has been on Prolia for over a year now-last was Jan8th 2018; patient thinks she has only had 2 injections    Lab Results   Component Value Date/Time    Cholesterol, total 173 08/15/2017 11:43 AM    HDL Cholesterol 97 08/15/2017 11:43 AM    LDL, calculated 65.6 08/15/2017 11:43 AM    VLDL, calculated 10.4 08/15/2017 11:43 AM    Triglyceride 52 08/15/2017 11:43 AM    CHOL/HDL Ratio 1.8 08/15/2017 11:43 AM   Compliant with Zocor, no side effects    Depression-still on Mirtazapine; denies any new sxs    Feeling bad for the last 3 days-cold sxs, headache; no fever but feels very cold  Took Tylenol which helped a bit    ROS:    Pt denies: Wt loss, Fever/Chills, HA, Visual changes, Fatigue, Chest pain, SOB, KUMAR, Abd pain, N/V/D/C, Blood in stool or urine, Edema. Pertinent positive as above in HPI.  All others were negative    Patient Active Problem List   Diagnosis Code    Hyperlipidemia E78.5    Essential hypertension, benign I10    Vitamin D deficiency E55.9    Advance directive discussed with patient Z70.80    Moderate single current episode of major depressive disorder ([facility] [address] F32.1    Age-related osteoporosis without current pathological fracture M81.0    Patient underweight R63.6       Past Medical History:   Diagnosis Date    Hypercholesterolemia     high in past / was on meds at that time but now off    MYRIAM (mycobacterium avium-intracellulare) infection ([facility] [address]     treated and released by Pulmonary       Current Outpatient Prescriptions   Medication Sig Dispense Refill           mirtazapine (REMERON) 30 mg tablet Take 1 Tab by mouth nightly. 90 Tab 1    simvastatin (ZOCOR) 20 mg tablet TAKE 1 TAB BY MOUTH NIGHTLY. 90 Tab 3    amLODIPine (NORVASC) 5 mg tablet Take 1 Tab by mouth daily. 90 Tab 3    denosumab (PROLIA) 60 mg/mL injection Once every 6 months 1 mL 2    CALCIUM PHOSPHATE TRIB/VIT D3 (CITRACAL + D PO) Take  by mouth.  flaxseed oil 1,000 mg Cap Take  by mouth.  LYCOPENE PO Take  by mouth.  red yeast rice extract 600 mg Cap Take 600 mg by mouth now. History   Smoking Status    Never Smoker   Smokeless Tobacco    Never Used       No Known Allergies    Patient Labs were reviewed: yes      Patient Past Records were reviewed:  yes        Objective:     Vitals:    02/01/18 1014 02/01/18 1048   BP: 147/65 130/68   Pulse: 84    Resp: 17    Temp: 96.4 °F (35.8 °C)    TempSrc: Oral    SpO2: 94%    Weight: 88 lb (39.9 kg)    Height: 5' 1\" (1.549 m)      Body mass index is 16.63 kg/(m^2). Exam:   Appearance: alert, well appearing,  oriented to person, place, and time, acyanotic, in no respiratory distress and well hydrated. HEENT:  NC/AT, pink conj, anicteric sclerae, nasal congestion, frontal tenderness  Neck:  No cervical lymphadenopathy, no JVD, no thyromegaly, no carotid bruit  Heart:  RRR without M/R/G  Lungs:  CTAB, no rhonchi, rales, or wheezes with good air exchange   Abdomen:  Non-tender, pos bowel sounds, no hepatosplenomegaly  Ext:  No C/C/E    Skin: no rash  Neuro: no lateralizing signs, CNs II-XII intact      Assessment/ Plan:   Diagnoses and all orders for this visit:    1. Essential hypertension, benign-controlled, continue with current dose of Norvasc    2. Hyperlipidemia, unspecified hyperlipidemia type-at goal on Zocor    3. Age-related osteoporosis without current pathological fracture-on Prolia; repeat Dexa this year    4. Moderate single current episode of major depressive disorder (HCC)-in remission on Remeron    5.  Vitamin D deficiency-on OTC Ca+D  Lab Results   Component Value Date/Time    Vitamin D 25-Hydroxy 31.9 08/15/2017 11:43 AM         6. Advance directive discussed with patient-see ACP note    7. Acute non-recurrent sinusitis, unspecified location-new presenting problem; RTC next week or go to ER if sxs worsen  -     azithromycin (ZITHROMAX) 250 mg tablet; Take 2 tablets today, then take 1 tablet daily        Follow-up Disposition:  Return in about 3 months (around 5/1/2018) for follow up. I have discussed the diagnosis with the patient and the intended plan as seen in the above orders. The patient has received an After-Visit Summary and questions were answered concerning future plans. Medication Side Effects and Warnings were discussed with patient: yes    Patient verbalized understanding of above instructions.     Mehul Ni MD  Internal Medicine  [facility]

## 2018-06-11 ENCOUNTER — OFFICE VISIT (OUTPATIENT)
Dept: FAMILY MEDICINE CLINIC | Age: 78
End: 2018-06-11

## 2018-06-11 ENCOUNTER — HOSPITAL ENCOUNTER (OUTPATIENT)
Dept: LAB | Age: 78
Discharge: HOME | End: 2018-06-11
Payer: MEDICARE

## 2018-06-11 VITALS
TEMPERATURE: 97.4 F | DIASTOLIC BLOOD PRESSURE: 70 MMHG | HEART RATE: 80 BPM | SYSTOLIC BLOOD PRESSURE: 140 MMHG | BODY MASS INDEX: 16.42 KG/M2 | OXYGEN SATURATION: 93 % | RESPIRATION RATE: 17 BRPM | HEIGHT: 61 IN | WEIGHT: 87 LBS

## 2018-06-11 DIAGNOSIS — E55.9 VITAMIN D DEFICIENCY: ICD-10-CM

## 2018-06-11 DIAGNOSIS — I10 ESSENTIAL HYPERTENSION, BENIGN: ICD-10-CM

## 2018-06-11 DIAGNOSIS — J47.1 BRONCHIECTASIS WITH ACUTE EXACERBATION (HCC): ICD-10-CM

## 2018-06-11 DIAGNOSIS — E78.5 HYPERLIPIDEMIA, UNSPECIFIED HYPERLIPIDEMIA TYPE: ICD-10-CM

## 2018-06-11 DIAGNOSIS — F32.1 MODERATE SINGLE CURRENT EPISODE OF MAJOR DEPRESSIVE DISORDER (HCC): ICD-10-CM

## 2018-06-11 DIAGNOSIS — R63.6 PATIENT UNDERWEIGHT: ICD-10-CM

## 2018-06-11 DIAGNOSIS — M81.0 AGE-RELATED OSTEOPOROSIS WITHOUT CURRENT PATHOLOGICAL FRACTURE: Primary | ICD-10-CM

## 2018-06-11 LAB
ALBUMIN SERPL-MCNC: 3.9 G/DL (ref 3.4–5)
ALBUMIN/GLOB SERPL: 1 {RATIO} (ref 0.8–1.7)
ALP SERPL-CCNC: 46 U/L (ref 45–117)
ALT SERPL-CCNC: 18 U/L (ref 13–56)
ANION GAP SERPL CALC-SCNC: 7 MMOL/L (ref 3–18)
AST SERPL-CCNC: 23 U/L (ref 15–37)
BASOPHILS # BLD: 0 K/UL (ref 0–0.06)
BASOPHILS NFR BLD: 0 % (ref 0–2)
BILIRUB SERPL-MCNC: 0.4 MG/DL (ref 0.2–1)
BUN SERPL-MCNC: 11 MG/DL (ref 7–18)
BUN/CREAT SERPL: 15 (ref 12–20)
CALCIUM SERPL-MCNC: 8.8 MG/DL (ref 8.5–10.1)
CHLORIDE SERPL-SCNC: 100 MMOL/L (ref 100–108)
CHOLEST SERPL-MCNC: 187 MG/DL
CO2 SERPL-SCNC: 31 MMOL/L (ref 21–32)
CREAT SERPL-MCNC: 0.75 MG/DL (ref 0.6–1.3)
DIFFERENTIAL METHOD BLD: NORMAL
EOSINOPHIL # BLD: 0.1 K/UL (ref 0–0.4)
EOSINOPHIL NFR BLD: 1 % (ref 0–5)
ERYTHROCYTE [DISTWIDTH] IN BLOOD BY AUTOMATED COUNT: 12.9 % (ref 11.6–14.5)
GLOBULIN SER CALC-MCNC: 4.1 G/DL (ref 2–4)
GLUCOSE SERPL-MCNC: 122 MG/DL (ref 74–99)
HCT VFR BLD AUTO: 42.7 % (ref 35–45)
HDLC SERPL-MCNC: 91 MG/DL (ref 40–60)
HDLC SERPL: 2.1 {RATIO} (ref 0–5)
HGB BLD-MCNC: 13.6 G/DL (ref 12–16)
LDLC SERPL CALC-MCNC: 84 MG/DL (ref 0–100)
LIPID PROFILE,FLP: ABNORMAL
LYMPHOCYTES # BLD: 1.7 K/UL (ref 0.9–3.6)
LYMPHOCYTES NFR BLD: 24 % (ref 21–52)
MCH RBC QN AUTO: 30.4 PG (ref 24–34)
MCHC RBC AUTO-ENTMCNC: 31.9 G/DL (ref 31–37)
MCV RBC AUTO: 95.3 FL (ref 74–97)
MONOCYTES # BLD: 0.6 K/UL (ref 0.05–1.2)
MONOCYTES NFR BLD: 8 % (ref 3–10)
NEUTS SEG # BLD: 4.8 K/UL (ref 1.8–8)
NEUTS SEG NFR BLD: 67 % (ref 40–73)
PLATELET # BLD AUTO: 263 K/UL (ref 135–420)
PMV BLD AUTO: 10.9 FL (ref 9.2–11.8)
POTASSIUM SERPL-SCNC: 4.2 MMOL/L (ref 3.5–5.5)
PROT SERPL-MCNC: 8 G/DL (ref 6.4–8.2)
RBC # BLD AUTO: 4.48 M/UL (ref 4.2–5.3)
SODIUM SERPL-SCNC: 138 MMOL/L (ref 136–145)
TRIGL SERPL-MCNC: 60 MG/DL (ref ?–150)
VLDLC SERPL CALC-MCNC: 12 MG/DL
WBC # BLD AUTO: 7.2 K/UL (ref 4.6–13.2)

## 2018-06-11 PROCEDURE — 80061 LIPID PANEL: CPT | Performed by: INTERNAL MEDICINE

## 2018-06-11 PROCEDURE — 80053 COMPREHEN METABOLIC PANEL: CPT | Performed by: INTERNAL MEDICINE

## 2018-06-11 PROCEDURE — 82306 VITAMIN D 25 HYDROXY: CPT | Performed by: INTERNAL MEDICINE

## 2018-06-11 PROCEDURE — 36415 COLL VENOUS BLD VENIPUNCTURE: CPT | Performed by: INTERNAL MEDICINE

## 2018-06-11 PROCEDURE — 85025 COMPLETE CBC W/AUTO DIFF WBC: CPT | Performed by: INTERNAL MEDICINE

## 2018-06-11 RX ORDER — SIMVASTATIN 20 MG/1
TABLET, FILM COATED ORAL
Qty: 90 TAB | Refills: 3 | Status: SHIPPED | OUTPATIENT
Start: 2018-06-11 | End: 2019-06-11 | Stop reason: SDUPTHER

## 2018-06-11 RX ORDER — BENZONATATE 100 MG/1
100 CAPSULE ORAL
Qty: 30 CAP | Refills: 1 | Status: SHIPPED | OUTPATIENT
Start: 2018-06-11 | End: 2018-11-27 | Stop reason: ALTCHOICE

## 2018-06-11 RX ORDER — AMLODIPINE BESYLATE 5 MG/1
5 TABLET ORAL DAILY
Qty: 90 TAB | Refills: 3 | Status: SHIPPED | OUTPATIENT
Start: 2018-06-11 | End: 2019-06-17 | Stop reason: SDUPTHER

## 2018-06-11 NOTE — PATIENT INSTRUCTIONS
Bronchiectasis: Care Instructions  Your Care Instructions  Bronchiectasis (say \"onacy-eho-WJV-tuh-juan\") is a lung problem in which the breathing tubes are stretched and become larger. The buildup of mucus causes the stretching and can lead to swelling and infections. You may find it harder to breathe and cough up mucus out of your lungs. Some people are born with it. Other people get it because of another health problem, usually cystic fibrosis or a lung infection such as pneumonia or tuberculosis. Symptoms are often different for everyone. But a cough that brings up mucus, or sputum, is common. The condition is usually treated with antibiotics, medicines to relax the airways (bronchodilators), and medicines to make it easier to cough up mucus (expectorants). Follow-up care is a key part of your treatment and safety. Be sure to make and go to all appointments, and call your doctor if you are having problems. It's also a good idea to know your test results and keep a list of the medicines you take. How can you care for yourself at home? · Take your antibiotics as directed. Do not stop taking them just because you feel better. You need to take the full course of antibiotics. · Take your medicines exactly as prescribed. Call your doctor if you think you are having a problem with your medicine. · If you have cystic fibrosis, follow your treatment plan. · If you or your child has bronchiectasis, follow directions from your doctor or respiratory therapist for moving your or your child's body into different positions to help drain fluid. This is called postural drainage, and it helps to ease breathing and prevent infections. · You also may do chest percussion on your child. This is strong clapping of the chest with a cupped hand to vibrate the airways in the lungs. The vibration helps your child cough up mucus. You may see a respiratory therapist to learn how to do chest percussion.   · Use an airway clearance device, such as a flutter valve, as directed to help remove mucus from the lungs. · Avoid lung infections. ¨ Get shots to protect against the flu and pneumococcal disease. ¨ Wash your hands frequently. ¨ Avoid close contact with people who have colds or the flu. ¨ Do not smoke or allow others to smoke around you. If you need help quitting, talk to your doctor about stop-smoking programs and medicines. These can increase your chances of quitting for good. ¨ Stay inside, if you can, on days when the pollution level is high. When should you call for help? Call 911 anytime you think you may need emergency care. For example, call if:  ? · You have severe trouble breathing. ? · You cough up more than 1 cup of blood. ?Call your doctor now or seek immediate medical care if:  ? · You have chest pain. ? · You have shortness of breath. ? · You have a fever. ? · Your mucus (sputum) is bloody or changes color. ? Watch closely for changes in your health, and be sure to contact your doctor if:  ? · You are coughing up more sputum than before. ? · Your symptoms get worse or do not get better with treatment. Where can you learn more? Go to http://mandy-rossy.info/. Enter C667 in the search box to learn more about \"Bronchiectasis: Care Instructions. \"  Current as of: May 12, 2017  Content Version: 11.4  © 2337-9481 [employer]. Care instructions adapted under license by [employer] (which disclaims liability or warranty for this information). If you have questions about a medical condition or this instruction, always ask your healthcare professional. [place] [address] any warranty or liability for your use of this information.

## 2018-06-11 NOTE — MR AVS SNAPSHOT
Deni Rodriguez [place] [address] 68 [facility] [place]. [address] [id number] 
[phone] Patient: Karen Polanco MRN: [id number] Visit Information Date & Time Provider Department Dept. Phone Encounter #  
 2018 11:30 AM Koby Etienne  [facility] [address] [id number] Follow-up Instructions Return in about 3 months (around 2018) for follow up. Your Appointments 2018 10:30 AM  
Office Visit with MD Ranjit Lawrence ([facility]) Appt Note: 295 [facility] 68 [facility] [place]. [address] [phone] [address] Upcoming Health Maintenance Date Due Influenza Age 5 to Adult 2018 MEDICARE YEARLY EXAM 2018 BREAST CANCER SCRN MAMMOGRAM 11/15/2018 GLAUCOMA SCREENING Q2Y 2019 COLONOSCOPY 11/3/2021 DTaP/Tdap/Td series (2 - Td) 2026 Allergies as of 2018  Review Complete On: 2018 By: Koby Etienne MD  
 No Known Allergies Current Immunizations  Reviewed on 10/31/2016 Name Date Influenza High Dose Vaccine PF 2017, 10/31/2016 Influenza Vaccine 9/15/2014, 2013 Influenza Vaccine Split 2012 Pneumococcal Conjugate (PCV-13) 10/9/2015 Pneumococcal Polysaccharide (PPSV-23) 2005 Tdap 2016 Not reviewed this visit You Were Diagnosed With   
  
 Codes Comments Age-related osteoporosis without current pathological fracture    -  Primary ICD-10-CM: M81.0 ICD-9-CM: 733.01 Essential hypertension, benign     ICD-10-CM: I10 
ICD-9-CM: 401.1 Moderate single current episode of major depressive disorder (HCC)     ICD-10-CM: F32.1 ICD-9-CM: 296.22 Hyperlipidemia, unspecified hyperlipidemia type     ICD-10-CM: E78.5 ICD-9-CM: 272.4 Patient underweight     ICD-10-CM: R63.6 ICD-9-CM: 783.22   
 Bronchiectasis with acute exacerbation ([facility]     ICD-10-CM: J47.1 ICD-9-CM: 494.1 Vitamin D deficiency     ICD-10-CM: E55.9 ICD-9-CM: 268.9 Vitals BP Pulse Temp Resp Height(growth percentile) Weight(growth percentile) 140/70 80 97.4 °F (36.3 °C) (Oral) 17 5' 1\" (1.549 m) 87 lb (39.5 kg) SpO2 BMI OB Status Smoking Status 93% 16.44 kg/m2 Postmenopausal Never Smoker Vitals History BMI and BSA Data Body Mass Index Body Surface Area  
 16.44 kg/m 2 1.3 m 2 Preferred Pharmacy Pharmacy Name Phone [facility], [address] [place] [address] [phone] Your Updated Medication List  
  
   
This list is accurate as of 18 12:16 PM.  Always use your most recent med list. amLODIPine 5 mg tablet Commonly known as:  Usama Lawrencech Take 1 Tab by mouth daily. benzonatate 100 mg capsule Commonly known as:  TESSALON Take 1 Cap by mouth three (3) times daily as needed for Cough. CITRACAL + D PO Take  by mouth. denosumab 60 mg/mL injection Commonly known as:  Maria C Hay Once every 6 months  
  
 flaxseed oil 1,000 mg Cap Take  by mouth. inhalational spacing device 1 Each by Does Not Apply route as needed. LYCOPENE PO Take  by mouth.  
  
 mirtazapine 30 mg tablet Commonly known as:  Maria C Quintanilla Take 1 Tab by mouth nightly. red yeast rice extract 600 mg Cap Take 600 mg by mouth now.  
  
 simvastatin 20 mg tablet Commonly known as:  ZOCOR  
TAKE 1 TAB BY MOUTH NIGHTLY. Prescriptions Printed Refills  
 inhalational spacing device 1 Si Each by Does Not Apply route as needed. Class: Print Route: Does Not Apply  
 simvastatin (ZOCOR) 20 mg tablet 3 Sig: TAKE 1 TAB BY MOUTH NIGHTLY. Class: Print  
 amLODIPine (NORVASC) 5 mg tablet 3 Sig: Take 1 Tab by mouth daily. Class: Print Route: Oral  
  
Prescriptions Sent to Pharmacy Refills benzonatate (TESSALON) 100 mg capsule 1 Sig: Take 1 Cap by mouth three (3) times daily as needed for Cough. Class: Normal  
 Pharm[facility]:  [id number] [facility] [phone] Route: Oral  
  
Follow-up Instructions Return in about 3 months (around 9/11/2018) for follow up. To-Do List   
 06/11/2018 Lab:  CBC WITH AUTOMATED DIFF   
  
 06/11/2018 Lab:  LIPID PANEL   
  
 06/11/2018 Lab:  METABOLIC PANEL, COMPREHENSIVE   
  
 06/11/2018 Lab:  VITAMIN D, 25 HYDROXY Patient Instructions Bronchiectasis: Care Instructions Your Care Instructions Bronchiectasis (say \"tswpu-rlc-FDW-tuh-juan\") is a lung problem in which the breathing tubes are stretched and become larger. The buildup of mucus causes the stretching and can lead to swelling and infections. You may find it harder to breathe and cough up mucus out of your lungs. Some people are born with it. Other people get it because of another health problem, usually cystic fibrosis or a lung infection such as pneumonia or tuberculosis. Symptoms are often different for everyone. But a cough that brings up mucus, or sputum, is common. The condition is usually treated with antibiotics, medicines to relax the airways (bronchodilators), and medicines to make it easier to cough up mucus (expectorants). Follow-up care is a key part of your treatment and safety. Be sure to make and go to all appointments, and call your doctor if you are having problems. It's also a good idea to know your test results and keep a list of the medicines you take. How can you care for yourself at home? · Take your antibiotics as directed. Do not stop taking them just because you feel better. You need to take the full course of antibiotics. · Take your medicines exactly as prescribed. Call your doctor if you think you are having a problem with your medicine. · If you have cystic fibrosis, follow your treatment plan. · If you or your child has bronchiectasis, follow directions from your doctor or respiratory therapist for moving your or your child's body into different positions to help drain fluid. This is called postural drainage, and it helps to ease breathing and prevent infections. · You also may do chest percussion on your child. This is strong clapping of the chest with a cupped hand to vibrate the airways in the lungs. The vibration helps your child cough up mucus. You may see a respiratory therapist to learn how to do chest percussion. · Use an airway clearance device, such as a flutter valve, as directed to help remove mucus from the lungs. · Avoid lung infections. ¨ Get shots to protect against the flu and pneumococcal disease. ¨ Wash your hands frequently. ¨ Avoid close contact with people who have colds or the flu. ¨ Do not smoke or allow others to smoke around you. If you need help quitting, talk to your doctor about stop-smoking programs and medicines. These can increase your chances of quitting for good. ¨ Stay inside, if you can, on days when the pollution level is high. When should you call for help? Call 911 anytime you think you may need emergency care. For example, call if: 
? · You have severe trouble breathing. ? · You cough up more than 1 cup of blood. ?Call your doctor now or seek immediate medical care if: 
? · You have chest pain. ? · You have shortness of breath. ? · You have a fever. ? · Your mucus (sputum) is bloody or changes color. ? Watch closely for changes in your health, and be sure to contact your doctor if: 
? · You are coughing up more sputum than before. ? · Your symptoms get worse or do not get better with treatment. Where can you learn more? Go to http://mandy-rossy.info/. Enter C667 in the search box to learn more about \"Bronchiectasis: Care Instructions. \" 
 Current as of: May 12, 2017 Content Version: 11.4 © 5949-3900 Healthwise, [employer]. Care instructions adapted under license by [employer] (which disclaims liability or warranty for this information). If you have questions about a medical condition or this instruction, always ask your healthcare professional. [address] any warranty or liability for your use of this information. Introducing [facility]! Dear Geraldine Middleton: 
Thank you for requesting a DialedIN account. Our records indicate that you already have an active DialedIN account. You can access your account anytime at [email or web address] Did you know that you can access your hospital and ER discharge instructions at any time in DialedIN? You can also review all of your test results from your hospital stay or ER visit. Additional Information If you have questions, please visit the Frequently Asked Questions section of the DialedIN website at [email or web address] Remember, DialedIN is NOT to be used for urgent needs. For medical emergencies, dial 911. Now available from your iPhone and Android! Please provide this summary of care documentation to your next provider. Your primary care clinician is listed as Jessika Marie. If you have any questions after today's visit, please call [phone].

## 2018-06-11 NOTE — PROGRESS NOTES
Chief Complaint   Patient presents with    Follow Up Chronic Condition     3 month; patient needs labs for Christel       Pt is a 77y.o. year old female who presents for follow up of her chronic medical problems  Brought by her niece Manoj Spear today    Low BMI  Wt Readings from Last 3 Encounters:   06/11/18 87 lb (39.5 kg)   02/01/18 88 lb (39.9 kg)   11/01/17 (P) 86 lb (39 kg)     BP Readings from Last 3 Encounters:   06/11/18 143/68   02/01/18 130/68   11/01/17 140/64   repeat BP-better    Lab Results   Component Value Date/Time    Cholesterol, total 187 06/11/2018 02:00 PM    HDL Cholesterol 91 (H) 06/11/2018 02:00 PM    LDL, calculated 84 06/11/2018 02:00 PM    VLDL, calculated 12 06/11/2018 02:00 PM    Triglyceride 60 06/11/2018 02:00 PM    CHOL/HDL Ratio 2.1 06/11/2018 02:00 PM   On Zocor-compliant  Fasting today    Depression-on Remeron    Has been coughing for a few months-dry cough; saw Pulmo/note reviewed today  Tickles her in her throat  Patient Instructions [place] MD - 06/08/2018 9:00 AM EDT  \"Your bronchoscopy result showed Acid Fast Bacillus and I 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 Please keep your infectious disease appointment. In the meantime, I ordered liver function test in anticipation that you may be started on special antibiotic for your lung infection. I also ordered blood test to check your immunoglobulin levels. Prescription for albuterol inhaler sent to your pharmacy. The albuterol inhaler is to be used 2 puffs every 6 hours as needed if needed for shortness of breath. Please monitor for side effects of medication including fast heart rate, irregular heart beats, tremors. Call if question. Return to the pulmonary office for follow up in 2 months. \"    CT CHEST W/O CONTRAST3/21/2018  [facility]  Result Impression   Impression:    When compared with the most recent available CT there has been some progression of disease both in severity and in areas of involvement.  Very little change is evident comparing the current  image with the chest radiograph of 2016 suggesting the majority of the progression since 2010 occurred prior to the 2016 radiograph     Needs cough med to try  Proventil inhaler did not help    Has appt with ID this month    On Prolia-needs blood test prior to July  This will be her fourth dose    ROS:    Pt denies: Wt loss, Fever/Chills, HA, Visual changes, Fatigue, Chest pain, SOB, KUMAR, Abd pain, N/V/D/C, Blood in stool or urine, Edema. Pertinent positive as above in HPI. All others were negative    Patient Active Problem List   Diagnosis Code    Hyperlipidemia E78.5    Essential hypertension, benign I10    Vitamin D deficiency E55.9    Advance directive discussed with patient Z70.80    Moderate single current episode of major depressive disorder ([facility] F32.1    Age-related osteoporosis without current pathological fracture M81.0    Patient underweight R63.6    Bronchiectasis with acute exacerbation ([facility] J47.1       Past Medical History:   Diagnosis Date    Hypercholesterolemia     high in past / was on meds at that time but now off    MYRIAM (mycobacterium avium-intracellulare) infection ([facility]     treated and released by Pulmonary       Current Outpatient Prescriptions   Medication Sig Dispense Refill    inhalational spacing device 1 Each by Does Not Apply route as needed. 1 Device 1    simvastatin (ZOCOR) 20 mg tablet TAKE 1 TAB BY MOUTH NIGHTLY. 90 Tab 3    amLODIPine (NORVASC) 5 mg tablet Take 1 Tab by mouth daily. 90 Tab 3    benzonatate (TESSALON) 100 mg capsule Take 1 Cap by mouth three (3) times daily as needed for Cough. 30 Cap 1    mirtazapine (REMERON) 30 mg tablet Take 1 Tab by mouth nightly.  90 Tab 1    denosumab (PROLIA) 60 mg/mL injection Once every 6 months 1 mL 2    CALCIUM PHOSPHATE TRIB/VIT D3 (CITRACAL + D PO) Take  by mouth.  flaxseed oil 1,000 mg Cap Take  by mouth.  LYCOPENE PO Take  by mouth.  red yeast rice extract 600 mg Cap Take 600 mg by mouth now. History   Smoking Status    Never Smoker   Smokeless Tobacco    Never Used       No Known Allergies    Patient Labs were reviewed: yes      Patient Past Records were reviewed:  yes        Objective:     Vitals:    06/11/18 1138 06/11/18 1214   BP: 143/68 140/70   Pulse: 80    Resp: 17    Temp: 97.4 °F (36.3 °C)    TempSrc: Oral    SpO2: 93%    Weight: 87 lb (39.5 kg)    Height: 5' 1\" (1.549 m)      Body mass index is 16.44 kg/(m^2). Exam:   Appearance: alert, well appearing,  oriented to person, place, and time, acyanotic, in no respiratory distress and well hydrated. HEENT:  NC/AT, pink conj, anicteric sclerae  Neck:  No cervical lymphadenopathy, no JVD, no thyromegaly, no carotid bruit  Heart:  RRR without M/R/G  Lungs:  CTAB, no rhonchi, rales, or wheezes with good air exchange   Abdomen:  Non-tender, pos bowel sounds, no hepatosplenomegaly  Ext:  No C/C/E    Skin: no rash  Neuro: no lateralizing signs, CNs II-XII intact      Assessment/ Plan:   Diagnoses and all orders for this visit:    1. Age-related osteoporosis without current pathological fracture-will give fourth dose of Prolia once CMP results are back    2. Essential hypertension, benign-controlled, continue with present meds  -     amLODIPine (NORVASC) 5 mg tablet; Take 1 Tab by mouth daily.  -     CBC WITH AUTOMATED DIFF; Future  -     METABOLIC PANEL, COMPREHENSIVE; Future    3. Moderate single current episode of major depressive disorder (HCC)-stable on Remeron    4. Hyperlipidemia, unspecified hyperlipidemia type-at goal on Zocor  -     simvastatin (ZOCOR) 20 mg tablet; TAKE 1 TAB BY MOUTH NIGHTLY. -     LIPID PANEL; Future    5. Patient underweight-continue with Boost    6.  Bronchiectasis with acute exacerbation (HCC)-will try the spacer as I suspect she does not know how to use the inhaler; keep appt with ID as this is a recurrence of MYRIAM she was treated for years ago-?colonization or not and if not, how long to treat/how to do surveillance  -     inhalational spacing device; 1 Each by Does Not Apply route as needed. -     benzonatate (TESSALON) 100 mg capsule; Take 1 Cap by mouth three (3) times daily as needed for Cough. 7. Vitamin D deficiency-on OTC Ca+d  -     VITAMIN D, 25 HYDROXY; Future        Follow-up Disposition:  Return in about 3 months (around 9/11/2018) for follow up. I have discussed the diagnosis with the patient and the intended plan as seen in the above orders. The patient has received an After-Visit Summary and questions were answered concerning future plans. Medication Side Effects and Warnings were discussed with patient: yes    Patient verbalized understanding of above instructions.     Remi Rodriguez MD  Internal Medicine  [facility]

## 2018-06-11 NOTE — PROGRESS NOTES
Chief Complaint   Patient presents with    Follow Up Chronic Condition     3 month; patient needs labs for Prolia     1. Have you been to the ER, urgent care clinic since your last visit? Hospitalized since your last visit? No    2. Have you seen or consulted any other health care providers outside of the [address] [place] [address] since your last visit? Include any pap smears or colon screening.  Yes Where: Pulmonary-Dr. Katie Valdez

## 2018-06-12 LAB — 25(OH)D3 SERPL-MCNC: 26.3 NG/ML (ref 30–100)

## 2018-06-18 DIAGNOSIS — M81.0 AGE-RELATED OSTEOPOROSIS WITHOUT CURRENT PATHOLOGICAL FRACTURE: ICD-10-CM

## 2018-08-17 ENCOUNTER — OFFICE VISIT (OUTPATIENT)
Dept: FAMILY MEDICINE CLINIC | Age: 78
End: 2018-08-17

## 2018-08-17 DIAGNOSIS — M89.9 DISORDER OF BONE AND CARTILAGE: ICD-10-CM

## 2018-08-17 DIAGNOSIS — R63.6 PATIENT UNDERWEIGHT: ICD-10-CM

## 2018-08-17 DIAGNOSIS — E78.5 HYPERLIPIDEMIA, UNSPECIFIED HYPERLIPIDEMIA TYPE: ICD-10-CM

## 2018-08-17 DIAGNOSIS — M81.0 AGE-RELATED OSTEOPOROSIS WITHOUT CURRENT PATHOLOGICAL FRACTURE: ICD-10-CM

## 2018-08-17 DIAGNOSIS — A31.0 MAI (MYCOBACTERIUM AVIUM-INTRACELLULARE) INFECTION (HCC): ICD-10-CM

## 2018-08-17 DIAGNOSIS — F32.1 MODERATE SINGLE CURRENT EPISODE OF MAJOR DEPRESSIVE DISORDER (HCC): ICD-10-CM

## 2018-08-17 DIAGNOSIS — M94.9 DISORDER OF BONE AND CARTILAGE: ICD-10-CM

## 2018-08-17 DIAGNOSIS — Z00.00 MEDICARE ANNUAL WELLNESS VISIT, SUBSEQUENT: Primary | ICD-10-CM

## 2018-08-17 DIAGNOSIS — I10 ESSENTIAL HYPERTENSION, BENIGN: ICD-10-CM

## 2018-08-17 DIAGNOSIS — Z71.89 ADVANCE DIRECTIVE DISCUSSED WITH PATIENT: ICD-10-CM

## 2018-08-17 DIAGNOSIS — E55.9 VITAMIN D DEFICIENCY: ICD-10-CM

## 2018-08-17 RX ORDER — RIFAMPIN 300 MG/1
300 CAPSULE ORAL
COMMUNITY
End: 2020-02-24 | Stop reason: ALTCHOICE

## 2018-08-17 RX ORDER — AZITHROMYCIN 500 MG/1
500 TABLET, FILM COATED ORAL
COMMUNITY
End: 2018-11-27 | Stop reason: ALTCHOICE

## 2018-08-17 RX ORDER — MIRTAZAPINE 30 MG/1
30 TABLET, FILM COATED ORAL
Qty: 90 TAB | Refills: 1 | Status: SHIPPED | OUTPATIENT
Start: 2018-08-17 | End: 2019-03-01 | Stop reason: SDUPTHER

## 2018-08-17 RX ORDER — ETHAMBUTOL HYDROCHLORIDE 400 MG/1
800 TABLET, FILM COATED ORAL
COMMUNITY
End: 2020-02-24 | Stop reason: ALTCHOICE

## 2018-08-17 NOTE — PROGRESS NOTES
Chief Complaint   Patient presents with   [facility] Annual Wellness Visit     1. Have you been to the ER, urgent care clinic since your last visit? Hospitalized since your last visit? No    2. Have you seen or consulted any other health care providers outside of the [address] [place] [address] since your last visit? Include any pap smears or colon screening.  Yes Where: [facility]

## 2018-08-17 NOTE — MR AVS SNAPSHOT
Deni Laineza [address] [facility] [place]. [address] [phone] 
[phone] Patient: Cora Mullen MRN: [id number] Pending sale to [facility]:[id number]/0/8342 Visit Information Date & Time Provider Department Dept. Phone Encounter #  
 8/17/2018 10:30 AM Luis Eduardo Moeller MD [facility] [id number] Follow-up Instructions Return in about 3 months (around 11/17/2018) for follow up. Your Appointments 11/20/2018  9:30 AM  
Follow Up with Luis Eduardo Moeller MD  
[facility] 23 (Stoneestrellita Manzano) Appt Note: BLOOD TEST FOR PROLIA AND CALCIUM LEVEL PER [facility] [place]. [address]  
  
   
 [address] Upcoming Health Maintenance Date Due Influenza Age 5 to Adult 8/1/2018 MEDICARE YEARLY EXAM 8/16/2018 BREAST CANCER SCRN MAMMOGRAM 11/15/2018 GLAUCOMA SCREENING Q2Y 6/26/2020 COLONOSCOPY 11/3/2021 DTaP/Tdap/Td series (2 - Td) 7/28/2026 Allergies as of 8/17/2018  Review Complete On: 8/17/2018 By: Luis Eduardo Moeller MD  
 No Known Allergies Current Immunizations  Reviewed on 8/17/2018 Name Date Influenza High Dose Vaccine PF 11/1/2017, 10/31/2016 Influenza Vaccine 9/15/2014, 12/16/2013 Influenza Vaccine Split 12/4/2012 Pneumococcal Conjugate (PCV-13) 10/9/2015 Pneumococcal Polysaccharide (PPSV-23) 1/1/2005 Tdap 7/28/2016 Reviewed by Luis Eduardo Moeller MD on 8/17/2018 at 10:53 AM  
You Were Diagnosed With   
  
 Codes Comments Medicare annual wellness visit, subsequent    -  Primary ICD-10-CM: Z00.00 ICD-9-CM: V70.0 Moderate single current episode of major depressive disorder (HCC)     ICD-10-CM: F32.1 ICD-9-CM: 296.22 Advance directive discussed with patient     ICD-10-CM: Z71.89 ICD-9-CM: V65.49 Disorder of bone and cartilage     ICD-10-CM: M89.9, M94.9 ICD-9-CM: 733.90 Vitals BP Pulse Temp Resp Height(growth percentile) Weight(growth percentile) 142/65 74 96.8 °F (36 °C) (Oral) 15 5' 1\" (1.549 m) 87 lb 3.2 oz (39.6 kg) SpO2 BMI OB Status Smoking Status (!) 4% 16.48 kg/m2 Postmenopausal Never Smoker Vitals History BMI and BSA Data Body Mass Index Body Surface Area  
 16.48 kg/m 2 1.31 m 2 Preferred Pharmacy Pharmacy Name Phone [id number], [address] [place], [place] [phone] Your Updated Medication List  
  
   
This list is accurate as of 8/17/18 11:13 AM.  Always use your most recent med list. amLODIPine 5 mg tablet Commonly known as:  [place] Take 1 Tab by mouth daily. azithromycin 500 mg Tab Commonly known as:  Lana [place] Take 500 mg by mouth every Monday, Wednesday, Friday. benzonatate 100 mg capsule Commonly known as:  TESSALON Take 1 Cap by mouth three (3) times daily as needed for Cough. CITRACAL + D PO Take  by mouth. denosumab 60 mg/mL injection Commonly known as:  Sapphire Grapes Once every 6 months  
  
 ethambutol 400 mg tablet Commonly known as:  MYAMBUTOL Take 800 mg by mouth every Monday, Wednesday, Friday. flaxseed oil 1,000 mg Cap Take  by mouth. inhalational spacing device 1 Each by Does Not Apply route as needed. LYCOPENE PO Take  by mouth.  
  
 mirtazapine 30 mg tablet Commonly known as:  Reesa August Take 1 Tab by mouth nightly. red yeast rice extract 600 mg Cap Take 600 mg by mouth now. rifAMPin 300 mg capsule Commonly known as:  RIFADIN Take 600 mg by mouth every Monday, Wednesday, Friday. simvastatin 20 mg tablet Commonly known as:  ZOCOR  
TAKE 1 TAB BY MOUTH NIGHTLY. Prescriptions Sent to Pharmacy Refills  
 mirtazapine (REMERON) 30 mg tablet 1 Sig: Take 1 Tab by mouth nightly.   
 Class: Normal  
 Pharmacy: [id number] [facility] Ph #: [phone] Route: Oral  
  
Follow-up Instructions Return in about 3 months (around 11/17/2018) for follow up. To-Do List   
 08/20/2018 Imaging:  DEXA BONE DENSITY STUDY AXIAL Patient Instructions Medicare Wellness Visit, Female The best way to live healthy is to have a lifestyle where you eat a well-balanced diet, exercise regularly, limit alcohol use, and quit all forms of tobacco/nicotine, if applicable. Regular preventive services are another way to keep healthy. Preventive services (vaccines, screening tests, monitoring & exams) can help personalize your care plan, which helps you manage your own care. Screening tests can find health problems at the earliest stages, when they are easiest to treat. Arnaldo Calderón follows the current, evidence-based guidelines published by the [facility] Carlos Crenshaw ([facility]) when recommending preventive services for our patients. Because we follow these guidelines, sometimes recommendations change over time as research supports it. (For example, mammograms used to be recommended annually. Even though Medicare will still pay for an annual mammogram, the newer guidelines recommend a mammogram every two years for women of average risk.) Of course, you and your doctor may decide to screen more often for some diseases, based on your risk and your health status. Preventive services for you include: - Medicare offers their members a free annual wellness visit, which is time for you and your primary care provider to discuss and plan for your preventive service needs. Take advantage of this benefit every year! 
-All adults over the age of 72 should receive the recommended pneumonia vaccines.  Current USPSTF guidelines recommend a series of two vaccines for the best pneumonia protection.  
-All adults should have a flu vaccine yearly and a tetanus vaccine every 10 years. All adults age 61 and older should receive a shingles vaccine once in their lifetime.   
-A bone mass density test is recommended when a woman turns 65 to screen for osteoporosis. This test is only recommended one time, as a screening. Some providers will use this same test as a disease monitoring tool if you already have osteoporosis. -All adults age 38-68 who are overweight should have a diabetes screening test once every three years.  
-Other screening tests and preventive services for persons with diabetes include: an eye exam to screen for diabetic retinopathy, a kidney function test, a foot exam, and stricter control over your cholesterol.  
-Cardiovascular screening for adults with routine risk involves an electrocardiogram (ECG) at intervals determined by your doctor.  
-Colorectal cancer screenings should be done for adults age 54-65 with no increased risk factors for colorectal cancer. There are a number of acceptable methods of screening for this type of cancer. Each test has its own benefits and drawbacks. Discuss with your doctor what is most appropriate for you during your annual wellness visit. The different tests include: colonoscopy (considered the best screening method), a fecal occult blood test, a fecal DNA test, and sigmoidoscopy. -Breast cancer screenings are recommended every other year for women of normal risk, age 54-69. 
-Cervical cancer screenings for women over age 72 are only recommended with certain risk factors.  
-All adults born between [facility][1] should be screened once for Hepatitis C. Here is a list of your current Health Maintenance items (your personalized list of preventive services) with a due date: 
Health Maintenance Due Topic Date Due  
 Flu Vaccine  08/01/2018 [address] [place] [address] Annual Well Visit  08/16/2018  Breast Cancer Screening  11/15/2018 Medicare Wellness Visit, Female The best way to live healthy is to have a lifestyle where you eat a well-balanced diet, exercise regularly, limit alcohol use, and quit all forms of tobacco/nicotine, if applicable. Regular preventive services are another way to keep healthy. Preventive services (vaccines, screening tests, monitoring & exams) can help personalize your care plan, which helps you manage your own care. Screening tests can find health problems at the earliest stages, when they are easiest to treat. Arnaldo Calderón follows the current, evidence-based guidelines published by the [facility] Carlos Flower ([facility]) when recommending preventive services for our patients. Because we follow these guidelines, sometimes recommendations change over time as research supports it. (For example, mammograms used to be recommended annually. Even though Medicare will still pay for an annual mammogram, the newer guidelines recommend a mammogram every two years for women of average risk.) Of course, you and your doctor may decide to screen more often for some diseases, based on your risk and your health status. Preventive services for you include: - Medicare offers their members a free annual wellness visit, which is time for you and your primary care provider to discuss and plan for your preventive service needs. Take advantage of this benefit every year! 
-All adults over the age of 72 should receive the recommended pneumonia vaccines. Current USPSTF guidelines recommend a series of two vaccines for the best pneumonia protection.  
-All adults should have a flu vaccine yearly and a tetanus vaccine every 10 years. All adults age 61 and older should receive a shingles vaccine once in their lifetime.   
-A bone mass density test is recommended when a woman turns 65 to screen for osteoporosis. This test is only recommended one time, as a screening. Some providers will use this same test as a disease monitoring tool if you already have osteoporosis. -All adults age 38-68 who are overweight should have a diabetes screening test once every three years.  
-Other screening tests and preventive services for persons with diabetes include: an eye exam to screen for diabetic retinopathy, a kidney function test, a foot exam, and stricter control over your cholesterol.  
-Cardiovascular screening for adults with routine risk involves an electrocardiogram (ECG) at intervals determined by your doctor.  
-Colorectal cancer screenings should be done for adults age 54-65 with no increased risk factors for colorectal cancer. There are a number of acceptable methods of screening for this type of cancer. Each test has its own benefits and drawbacks. Discuss with your doctor what is most appropriate for you during your annual wellness visit. The different tests include: colonoscopy (considered the best screening method), a fecal occult blood test, a fecal DNA test, and sigmoidoscopy. -Breast cancer screenings are recommended every other year for women of normal risk, age 54-69. 
-Cervical cancer screenings for women over age 72 are only recommended with certain risk factors.  
-All adults born between [facility] should be screened once for Hepatitis C. Here is a list of your current Health Maintenance items (your personalized list of preventive services) with a due date: 
Health Maintenance Due Topic Date Due  
 Flu Vaccine  08/01/2018 [employer] Annual Well Visit  08/16/2018  Breast Cancer Screening  11/15/2018 Introducing [address]! Dear James Chase: 
Thank you for requesting a dynaTrace software account. Our records indicate that you already have an active dynaTrace software account. You can access your account anytime at [email or web address] Did you know that you can access your hospital and ER discharge instructions at any time in dynaTrace software? You can also review all of your test results from your hospital stay or ER visit. Additional Information If you have questions, please visit the Frequently Asked Questions section of the Re Pethart website at [email or web address] Remember, [employer] is NOT to be used for urgent needs. For medical emergencies, dial 911. Now available from your iPhone and Android! Please provide this summary of care documentation to your next provider. Your primary care clinician is listed as Fransisca Mcneil. If you have any questions after today's visit, please call [phone].

## 2018-08-17 NOTE — PATIENT INSTRUCTIONS
Medicare Wellness Visit, Female     The best way to live healthy is to have a lifestyle where you eat a well-balanced diet, exercise regularly, limit alcohol use, and quit all forms of tobacco/nicotine, if applicable. Regular preventive services are another way to keep healthy. Preventive services (vaccines, screening tests, monitoring & exams) can help personalize your care plan, which helps you manage your own care. Screening tests can find health problems at the earliest stages, when they are easiest to treat. Arnaldo Calderón follows the current, evidence-based guidelines published by the [facility] Carlos Flower ([facility]) when recommending preventive services for our patients. Because we follow these guidelines, sometimes recommendations change over time as research supports it. (For example, mammograms used to be recommended annually. Even though Medicare will still pay for an annual mammogram, the newer guidelines recommend a mammogram every two years for women of average risk.)  Of course, you and your doctor may decide to screen more often for some diseases, based on your risk and your health status. Preventive services for you include:  - Medicare offers their members a free annual wellness visit, which is time for you and your primary care provider to discuss and plan for your preventive service needs. Take advantage of this benefit every year!  -All adults over the age of 72 should receive the recommended pneumonia vaccines. Current USPSTF guidelines recommend a series of two vaccines for the best pneumonia protection.   -All adults should have a flu vaccine yearly and a tetanus vaccine every 10 years. All adults age 61 and older should receive a shingles vaccine once in their lifetime.    -A bone mass density test is recommended when a woman turns 65 to screen for osteoporosis. This test is only recommended one time, as a screening.  Some providers will use this same test as a disease monitoring tool if you already have osteoporosis. -All adults age 38-68 who are overweight should have a diabetes screening test once every three years.   -Other screening tests and preventive services for persons with diabetes include: an eye exam to screen for diabetic retinopathy, a kidney function test, a foot exam, and stricter control over your cholesterol.   -Cardiovascular screening for adults with routine risk involves an electrocardiogram (ECG) at intervals determined by your doctor.   -Colorectal cancer screenings should be done for adults age 54-65 with no increased risk factors for colorectal cancer. There are a number of acceptable methods of screening for this type of cancer. Each test has its own benefits and drawbacks. Discuss with your doctor what is most appropriate for you during your annual wellness visit. The different tests include: colonoscopy (considered the best screening method), a fecal occult blood test, a fecal DNA test, and sigmoidoscopy. -Breast cancer screenings are recommended every other year for women of normal risk, age 54-69.  -Cervical cancer screenings for women over age 72 are only recommended with certain risk factors.   -All adults born between [facility] should be screened once for Hepatitis C. Here is a list of your current Health Maintenance items (your personalized list of preventive services) with a due date:  Health Maintenance Due   Topic Date Due    Flu Vaccine  08/01/2018    Annual Well Visit  08/16/2018    Breast Cancer Screening  11/15/2018           Medicare Wellness Visit, Female     The best way to live healthy is to have a lifestyle where you eat a well-balanced diet, exercise regularly, limit alcohol use, and quit all forms of tobacco/nicotine, if applicable. Regular preventive services are another way to keep healthy.  Preventive services (vaccines, screening tests, monitoring & exams) can help personalize your care plan, which helps you manage your own care. Screening tests can find health problems at the earliest stages, when they are easiest to treat. Arnaldo Calderón follows the current, evidence-based guidelines published by the [facility] Carlos Crenshaw ([facility]) when recommending preventive services for our patients. Because we follow these guidelines, sometimes recommendations change over time as research supports it. (For example, mammograms used to be recommended annually. Even though Medicare will still pay for an annual mammogram, the newer guidelines recommend a mammogram every two years for women of average risk.)  Of course, you and your doctor may decide to screen more often for some diseases, based on your risk and your health status. Preventive services for you include:  - Medicare offers their members a free annual wellness visit, which is time for you and your primary care provider to discuss and plan for your preventive service needs. Take advantage of this benefit every year!  -All adults over the age of 72 should receive the recommended pneumonia vaccines. Current USPSTF guidelines recommend a series of two vaccines for the best pneumonia protection.   -All adults should have a flu vaccine yearly and a tetanus vaccine every 10 years. All adults age 61 and older should receive a shingles vaccine once in their lifetime.    -A bone mass density test is recommended when a woman turns 65 to screen for osteoporosis. This test is only recommended one time, as a screening. Some providers will use this same test as a disease monitoring tool if you already have osteoporosis.   -All adults age 38-68 who are overweight should have a diabetes screening test once every three years.   -Other screening tests and preventive services for persons with diabetes include: an eye exam to screen for diabetic retinopathy, a kidney function test, a foot exam, and stricter control over your cholesterol.   -Cardiovascular screening for adults with routine risk involves an electrocardiogram (ECG) at intervals determined by your doctor.   -Colorectal cancer screenings should be done for adults age 54-65 with no increased risk factors for colorectal cancer. There are a number of acceptable methods of screening for this type of cancer. Each test has its own benefits and drawbacks. Discuss with your doctor what is most appropriate for you during your annual wellness visit. The different tests include: colonoscopy (considered the best screening method), a fecal occult blood test, a fecal DNA test, and sigmoidoscopy. -Breast cancer screenings are recommended every other year for women of normal risk, age 54-69.  -Cervical cancer screenings for women over age 72 are only recommended with certain risk factors.   -All adults born between [facility] should be screened once for Hepatitis C.      Here is a list of your current Health Maintenance items (your personalized list of preventive services) with a due date:  Health Maintenance Due   Topic Date Due    Flu Vaccine  08/01/2018    Annual Well Visit  08/16/2018    Breast Cancer Screening  11/15/2018

## 2018-08-17 NOTE — PROGRESS NOTES
This is the Subsequent Medicare Annual Wellness Exam, performed 12 months or more after the Initial AWV or the last Subsequent AWV    I have reviewed the patient's medical history in detail and updated the computerized patient record. Immunizations discussed:Shingrix, high dose flu in Oct    Lab Results   Component Value Date/Time    Cholesterol, total 187 06/11/2018 02:00 PM    HDL Cholesterol 91 (H) 06/11/2018 02:00 PM    LDL, calculated 84 06/11/2018 02:00 PM    VLDL, calculated 12 06/11/2018 02:00 PM    Triglyceride 60 06/11/2018 02:00 PM    CHOL/HDL Ratio 2.1 06/11/2018 02:00 PM     Has been on Prolia-needs follow up Dexa    mammo -due end of this year        History     Past Medical History:   Diagnosis Date    Hypercholesterolemia     high in past / was on meds at that time but now off    MYRIAM (mycobacterium avium-intracellulare) infection ([facility] [address]     treated and released by Pulmonary      History reviewed. No pertinent surgical history. Current Outpatient Prescriptions   Medication Sig Dispense Refill    ethambutol (MYAMBUTOL) 400 mg tablet Take 800 mg by mouth every Monday, Wednesday, Friday.  azithromycin (ZITHROMAX) 500 mg tab Take 500 mg by mouth every Monday, Wednesday, Friday.  rifAMPin (RIFADIN) 300 mg capsule Take 600 mg by mouth every Monday, Wednesday, Friday.  mirtazapine (REMERON) 30 mg tablet Take 1 Tab by mouth nightly. 90 Tab 1    inhalational spacing device 1 Each by Does Not Apply route as needed. 1 Device 1    simvastatin (ZOCOR) 20 mg tablet TAKE 1 TAB BY MOUTH NIGHTLY. 90 Tab 3    amLODIPine (NORVASC) 5 mg tablet Take 1 Tab by mouth daily. 90 Tab 3    denosumab (PROLIA) 60 mg/mL injection Once every 6 months 1 mL 2    CALCIUM PHOSPHATE TRIB/VIT D3 (CITRACAL + D PO) Take  by mouth.  flaxseed oil 1,000 mg Cap Take  by mouth.  LYCOPENE PO Take  by mouth.  red yeast rice extract 600 mg Cap Take 600 mg by mouth now.       benzonatate (TESSALON) 100 mg capsule Take 1 Cap by mouth three (3) times daily as needed for Cough. 30 Cap 1     No Known Allergies  History reviewed. No pertinent family history. Social History   Substance Use Topics    Smoking status: Never Smoker    Smokeless tobacco: Never Used    Alcohol use No     Patient Active Problem List   Diagnosis Code    Hyperlipidemia E78.5    Essential hypertension, benign I10    Vitamin D deficiency E55.9    Advance directive discussed with patient Z71.89    Moderate single current episode of major depressive disorder ([facility] [address] F32.1    Age-related osteoporosis without current pathological fracture M81.0    Patient underweight R63.6    Bronchiectasis with acute exacerbation ([facility] [address] J47.1    MYRIAM (mycobacterium avium-intracellulare) infection ([facility] [address] A31.0       Depression Risk Factor Screening:   No flowsheet data found. Alcohol Risk Factor Screening: You do not drink alcohol or very rarely. Functional Ability and Level of Safety:   Hearing Loss  Hearing is good. Activities of Daily Living  The home contains: no safety equipment. Patient does total self care    Fall Risk  Fall Risk Assessment, last 12 mths 2/1/2018   Able to walk? Yes   Fall in past 12 months?  No       Abuse Screen  Patient is not abused    Cognitive Screening   Evaluation of Cognitive Function:  Has your family/caregiver stated any concerns about your memory: no  Normal MMSE    Patient Care Team   Patient Care Team:  Amaury Lindsey MD as PCP - General (Internal Medicine)  Dragan Winn MD (Internal Medicine)  Jamel Mistry MD (Ophthalmology)  Michael Hooks RN as Ambulatory Care Navigator  Carli Wright MD (Gastroenterology)  Darrian Armstrong MD (Unknown Physician Specialty)    Assessment/Plan   Education and counseling provided:  Are appropriate based on today's review and evaluation  End-of-Life planning (with patient's consent)-son will bring copy of signed form next visit  Pneumococcal Vaccine-done  Influenza Vaccine-high dose flu shot in Oct  Screening Mammography-done 11/2017, normal; will do it Q2 yrs  Cardiovascular screening blood test-fasting lipids up to date  Bone mass measurement (DEXA)-due/has been on Prolia since 2016  Screening for glaucoma-eye exam is current  Diabetes screening test-FBs current    Diagnoses and all orders for this visit:    1. Medicare annual wellness visit, subsequent-Refer to above for plan and to patient instructions for recommendations on HM    2. Advance directive discussed with patient    3. Disorder of bone and cartilage  -     Dexa Bone Density Study Axial ([id number]);  Future        Health Maintenance Due   Topic Date Due    Influenza Age 5 to Adult  08/01/2018    BREAST CANCER SCRN MAMMOGRAM  11/15/2018     RTC yearly for wellness visit

## 2018-08-17 NOTE — ACP (ADVANCE CARE PLANNING)
Advance Care Planning (ACP) Provider Conversation Snapshot    Date of ACP Conversation: 08/17/18  Persons included in Conversation:  patient  Length of ACP Conversation in minutes:  <16 minutes (Non-Billable)    Authorized Decision Maker (if patient is incapable of making informed decisions):    This person is:   her sonEmi          For Patients with Decision Making Capacity:   Values/Goals: Exploration of values, goals, and preferences if recovery is not expected, even with continued medical treatment in the event of:  Imminent death  Severe, permanent brain injury    Conversation Outcomes / Follow-Up Plan:   Recommended completion of Advance Directive form after review of ACP materials and conversation with prospective healthcare agent

## 2018-08-19 VITALS
TEMPERATURE: 96.8 F | DIASTOLIC BLOOD PRESSURE: 65 MMHG | WEIGHT: 87.2 LBS | RESPIRATION RATE: 15 BRPM | HEIGHT: 61 IN | SYSTOLIC BLOOD PRESSURE: 142 MMHG | HEART RATE: 74 BPM | BODY MASS INDEX: 16.46 KG/M2

## 2018-08-19 PROBLEM — A31.0 MAI (MYCOBACTERIUM AVIUM-INTRACELLULARE) INFECTION (HCC): Status: ACTIVE | Noted: 2018-08-19

## 2018-08-19 NOTE — PROGRESS NOTES
Chief Complaint   Patient presents with    Annual Wellness Visit    Follow Up Chronic Condition       Pt is a 77y.o. year old female who presents for follow up of her chronic medical problems    Being treated for Bronchiectasis secondary to MYRIAM by ID and Pulmo  Med dosages reviewed today with her son after I read the ID note gwen re Ethambutol dose  \"Telephone Encounter - Senthil Garcia - 07/30/2018 2:45 PM EDT  Received ophthalmology and cardiology clearance for starting treatment of pulmonary MAC. Prescriptions for azithromycin 500 mg PO + ethambutol 1,000 mg PO + rifampin 600 mg PO all to be taken every Monday, Wednesday and Friday have been sent to patient's pharmacy. Please call patient/her POC, Ms. Cristela Spence and let her know that the medications have been sent to her pharmacy. Please remind her that she will be taking the medications every Monday, Wednesday and Friday. Further let her know that she will be taking 1,000 mg of the ethambutol (two 400 mg tablets and two 100 mg tablets) which will come in 2 different bottles in order to equal the prescribed amount. Please instruct her to read the package inserts prior to starting the medications. Instruct her to call the office if she has any questions or has any difficulty tolerating the medications. Please schedule patient for follow up with me on 8/21/18 at 11:00 am.\"       Discussed above with son as well as the following:   May take Tessalon even with the antibiotics as she is still coughing  Prolonged course for 118 days, MWF antibiotic dosing    Wt Readings from Last 3 Encounters:   08/17/18 87 lb 3.2 oz (39.6 kg)   06/11/18 87 lb (39.5 kg)   02/01/18 88 lb (39.9 kg)   Taking Ensure    BP Readings from Last 3 Encounters:   08/17/18 142/65   06/11/18 140/70   02/01/18 130/68   Repeat BP still slightly elevated as above    Lab Results   Component Value Date/Time    Cholesterol, total 187 06/11/2018 02:00 PM    HDL Cholesterol 91 (H) 06/11/2018 02:00 PM    LDL, calculated 84 06/11/2018 02:00 PM    VLDL, calculated 12 06/11/2018 02:00 PM    Triglyceride 60 06/11/2018 02:00 PM    CHOL/HDL Ratio 2.1 06/11/2018 02:00 PM       Needs refill of Remeron for depression    Has been on Prolia for 2 years now    ROS:    Pt denies: Wt loss, Fever/Chills, HA, Visual changes, Fatigue, Chest pain, SOB, KUMAR, Abd pain, N/V/D/C, Blood in stool or urine, Edema. Pertinent positive as above in HPI. All others were negative    Patient Active Problem List   Diagnosis Code    Hyperlipidemia E78.5    Essential hypertension, benign I10    Vitamin D deficiency E55.9    Advance directive discussed with patient Z70.80    Moderate single current episode of major depressive disorder ([facility] F32.1    Age-related osteoporosis without current pathological fracture M81.0    Patient underweight R63.6    Bronchiectasis with acute exacerbation ([facility] J47.1    MYRIAM (mycobacterium avium-intracellulare) infection ([facility] A31.0       Past Medical History:   Diagnosis Date    Hypercholesterolemia     high in past / was on meds at that time but now off    MYRIAM (mycobacterium avium-intracellulare) infection ([facility]     treated and released by Pulmonary       Current Outpatient Prescriptions   Medication Sig Dispense Refill    ethambutol (MYAMBUTOL) 400 mg tablet Take 800 mg by mouth every Monday, Wednesday, Friday.  azithromycin (ZITHROMAX) 500 mg tab Take 500 mg by mouth every Monday, Wednesday, Friday.  rifAMPin (RIFADIN) 300 mg capsule Take 600 mg by mouth every Monday, Wednesday, Friday.  mirtazapine (REMERON) 30 mg tablet Take 1 Tab by mouth nightly. 90 Tab 1    inhalational spacing device 1 Each by Does Not Apply route as needed. 1 Device 1    simvastatin (ZOCOR) 20 mg tablet TAKE 1 TAB BY MOUTH NIGHTLY. 90 Tab 3    amLODIPine (NORVASC) 5 mg tablet Take 1 Tab by mouth daily.  90 Tab 3    denosumab (PROLIA) 60 mg/mL injection Once every 6 months 1 mL 2    CALCIUM PHOSPHATE TRIB/VIT D3 (CITRACAL + D PO) Take  by mouth.  flaxseed oil 1,000 mg Cap Take  by mouth.  LYCOPENE PO Take  by mouth.  red yeast rice extract 600 mg Cap Take 600 mg by mouth now.  benzonatate (TESSALON) 100 mg capsule Take 1 Cap by mouth three (3) times daily as needed for Cough. 27 Cap 1       History   Smoking Status    Never Smoker   Smokeless Tobacco    Never Used       No Known Allergies    Patient Labs were reviewed: yes      Patient Past Records were reviewed:  yes        Objective:     Vitals:    08/17/18 1037   BP: 142/65   Pulse: 74   Resp: 15   Temp: 96.8 °F (36 °C)   TempSrc: Oral   Weight: 87 lb 3.2 oz (39.6 kg)   Height: 5' 1\" (1.549 m)     Body mass index is 16.48 kg/(m^2). Exam:   Appearance: alert, well appearing,  oriented to person, place, and time, acyanotic, in no respiratory distress and well hydrated. HEENT:  NC/AT, pink conj, anicteric sclerae  Neck:  No cervical lymphadenopathy, no JVD, no thyromegaly, no carotid bruit  Heart:  RRR without M/R/G  Lungs:  CTAB, no rhonchi, rales, or wheezes with good air exchange   Abdomen:  Non-tender, pos bowel sounds, no hepatosplenomegaly  Ext:  No C/C/E    Skin: no rash  Neuro: no lateralizing signs, CNs II-XII intact      Assessment/ Plan:   Diagnoses and all orders for this visit:    1. Medicare annual wellness visit, subsequent-see note    2. MYRIAM (mycobacterium avium-intracellulare) infection (HCC)-continue with antibiotics and ID/Pulmo follow ups    3. Essential hypertension, benign-SBP slightly high today, will observe for now gwen since she is very frail    4. Moderate single current episode of major depressive disorder (HCC)  -     mirtazapine (REMERON) 30 mg tablet; Take 1 Tab by mouth nightly. 5. Age-related osteoporosis without current pathological fracture-on Prolia since 2016, needs follow up Dexa    6. Hyperlipidemia, unspecified hyperlipidemia type-at goal on Zocor    7.  Vitamin D deficiency-on OTC Vit D  Lab Results   Component Value Date/Time    Vitamin D 25-Hydroxy 26.3 (L) 06/11/2018 02:00 PM         8. Patient underweight-continue with Ensure or Boost 2-3x a day with meals        Follow-up Disposition:  Return in about 3 months (around 11/17/2018) for follow up. I have discussed the diagnosis with the patient and the intended plan as seen in the above orders. The patient has received an After-Visit Summary and questions were answered concerning future plans. Medication Side Effects and Warnings were discussed with patient: yes    Patient verbalized understanding of above instructions.     Nora Morel MD  Internal Medicine  [address] [facility]

## 2018-08-22 ENCOUNTER — TELEPHONE (OUTPATIENT)
Dept: FAMILY MEDICINE CLINIC | Age: 78
End: 2018-08-22

## 2018-08-22 NOTE — TELEPHONE ENCOUNTER
Patient's son called to let us know that Ms. Frye had an abnormal EKG at [facility]'s office. She was sent to [facility] for further evaluation. All test results were normal. Patient's son would like to know if she needs to be seen sooner than her November appointment? He is aware that you are out of the office until Tuesday.

## 2018-11-27 ENCOUNTER — OFFICE VISIT (OUTPATIENT)
Dept: FAMILY MEDICINE CLINIC | Age: 78
End: 2018-11-27

## 2018-11-27 VITALS
OXYGEN SATURATION: 94 % | HEIGHT: 61 IN | SYSTOLIC BLOOD PRESSURE: 140 MMHG | BODY MASS INDEX: 16.24 KG/M2 | RESPIRATION RATE: 16 BRPM | DIASTOLIC BLOOD PRESSURE: 64 MMHG | HEART RATE: 73 BPM | TEMPERATURE: 96.3 F | WEIGHT: 86 LBS

## 2018-11-27 DIAGNOSIS — R10.10 PAIN OF UPPER ABDOMEN: Primary | ICD-10-CM

## 2018-11-27 DIAGNOSIS — A31.0 MAI (MYCOBACTERIUM AVIUM-INTRACELLULARE) INFECTION (HCC): ICD-10-CM

## 2018-11-27 DIAGNOSIS — M81.0 AGE-RELATED OSTEOPOROSIS WITHOUT CURRENT PATHOLOGICAL FRACTURE: ICD-10-CM

## 2018-11-27 DIAGNOSIS — E78.5 HYPERLIPIDEMIA, UNSPECIFIED HYPERLIPIDEMIA TYPE: ICD-10-CM

## 2018-11-27 DIAGNOSIS — I10 ESSENTIAL HYPERTENSION, BENIGN: ICD-10-CM

## 2018-11-27 DIAGNOSIS — R19.02 LEFT UPPER QUADRANT ABDOMINAL MASS: ICD-10-CM

## 2018-11-27 RX ORDER — OMEPRAZOLE 40 MG/1
40 CAPSULE, DELAYED RELEASE ORAL DAILY
Qty: 90 CAP | Refills: 1 | Status: SHIPPED | OUTPATIENT
Start: 2018-11-27 | End: 2019-08-19 | Stop reason: SINTOL

## 2018-11-27 NOTE — PROGRESS NOTES
Chief Complaint   Patient presents with    Follow Up Chronic Condition     3 month; patient is fasting    Abdominal Pain     epigastric pain       Pt is a 77y.o. year old female who presents for follow up of her chronic medical problems    ID and Pulmo following her-still on antibiotics for MYRIAM bronchiectasis,  stopped coughing    Indigestion-Zantac not helping  GI note reviewed-last seen there in 2017    Wt Readings from Last 3 Encounters:   11/27/18 86 lb (39 kg)   08/17/18 87 lb 3.2 oz (39.6 kg)   06/11/18 87 lb (39.5 kg)       BP Readings from Last 3 Encounters:   11/27/18 146/61   08/17/18 142/65   06/11/18 140/70   Repeat BP- still slightly elevated  BPs also elevated at home    ROS:    Pt denies: Wt loss, Fever/Chills, HA, Visual changes, Fatigue, Chest pain, SOB, KUMAR, Abd pain, N/V/D/C, Blood in stool or urine, Edema. Pertinent positive as above in HPI. All others were negative    Patient Active Problem List   Diagnosis Code    Hyperlipidemia E78.5    Essential hypertension, benign I10    Vitamin D deficiency E55.9    Advance directive discussed with patient Z70.80    Moderate single current episode of major depressive disorder ([facility] F32.1    Age-related osteoporosis without current pathological fracture M81.0    Patient underweight R63.6    Bronchiectasis with acute exacerbation ([facility] J47.1    MYRIAM (mycobacterium avium-intracellulare) infection ([facility] [facility] A31.0       Past Medical History:   Diagnosis Date    Hypercholesterolemia     high in past / was on meds at that time but now off    MYRIAM (mycobacterium avium-intracellulare) infection ([facility] [address] [facility]     treated and released by Pulmonary       Current Outpatient Medications   Medication Sig Dispense Refill    ethambutol (MYAMBUTOL) 400 mg tablet Take 800 mg by mouth every Monday, Wednesday, Friday.  rifAMPin (RIFADIN) 300 mg capsule Take 600 mg by mouth every Monday, Wednesday, Friday.       mirtazapine (REMERON) 30 mg tablet Take 1 Tab by mouth nightly. 90 Tab 1    simvastatin (ZOCOR) 20 mg tablet TAKE 1 TAB BY MOUTH NIGHTLY. 90 Tab 3    amLODIPine (NORVASC) 5 mg tablet Take 1 Tab by mouth daily. 90 Tab 3    denosumab (PROLIA) 60 mg/mL injection Once every 6 months 1 mL 2    CALCIUM PHOSPHATE TRIB/VIT D3 (CITRACAL + D PO) Take  by mouth.  flaxseed oil 1,000 mg Cap Take  by mouth.  LYCOPENE PO Take  by mouth.  red yeast rice extract 600 mg Cap Take 600 mg by mouth now. Social History     Tobacco Use   Smoking Status Never Smoker   Smokeless Tobacco Never Used       No Known Allergies    Patient Labs were reviewed: yes      Patient Past Records were reviewed:  yes        Objective:     Vitals:    11/27/18 0934 11/27/18 1015   BP: 146/61 140/64   Pulse: 73    Resp: 16    Temp: 96.3 °F (35.7 °C)    SpO2: 94%    Weight: 86 lb (39 kg)    Height: 5' 1\" (1.549 m)      Body mass index is 16.25 kg/m². Exam:   Appearance: alert, well appearing,  oriented to person, place, and time, acyanotic, in no respiratory distress and well hydrated. HEENT:  NC/AT, pink conj, anicteric sclerae  Neck:  No cervical lymphadenopathy, no JVD, no thyromegaly, no carotid bruit  Heart:  RRR without M/R/G  Lungs:  CTAB, no rhonchi, rales, or wheezes with good air exchange   Abdomen:  Non-tender, pos bowel sounds, no hepatosplenomegaly, palpable mass on the LUQ  Ext:  No C/C/E    Skin: no rash  Neuro: no lateralizing signs, CNs II-XII intact      Assessment/ Plan:   Diagnoses and all orders for this visit:    1. Pain of upper abdomen-son advised to schedule for patient to see GI for follow up  -     omeprazole (PRILOSEC) 40 mg capsule; Take 1 Cap by mouth daily. -     CT ABD PELV WO CONT; Future    2. Left upper quadrant abdominal mass-?etio  -     CT ABD PELV WO CONT; Future    3. Essential hypertension, benign-at her age and frailty, will accept SBP in the 140-150 range    4.  MYRIAM (mycobacterium avium-intracellulare) infection (HCC)-continue with antibiotics c/o ID, Pulmo also following    5. Age-related osteoporosis without current pathological fracture-on Prolia    6. Hyperlipidemia, unspecified hyperlipidemia type-at goal on Zocor  Lab Results   Component Value Date/Time    Cholesterol, total 187 06/11/2018 02:00 PM    HDL Cholesterol 91 (H) 06/11/2018 02:00 PM    LDL, calculated 84 06/11/2018 02:00 PM    VLDL, calculated 12 06/11/2018 02:00 PM    Triglyceride 60 06/11/2018 02:00 PM    CHOL/HDL Ratio 2.1 06/11/2018 02:00 PM           Follow-up Disposition:  Return in about 3 months (around 2/27/2019) for follow up. I have discussed the diagnosis with the patient and the intended plan as seen in the above orders. The patient has received an After-Visit Summary and questions were answered concerning future plans. Medication Side Effects and Warnings were discussed with patient: yes    Patient verbalized understanding of above instructions.     Andrei Suazo MD  Internal Medicine  [facility]

## 2018-11-27 NOTE — PROGRESS NOTES
Chief Complaint   Patient presents with    Follow Up Chronic Condition     3 month; patient is fasting    Abdominal Pain     epigastric pain       1. Have you been to the ER, urgent care clinic since your last visit? Hospitalized since your last visit? Yes [facility]    2. Have you seen or consulted any other health care providers outside of the [address] [place] [address] since your last visit? Include any pap smears or colon screening. Yes Where: Dr. Hernandez [facility]   Infectious Disease consult. Patient reports elevated BP readings at other doctor's appointments.

## 2018-11-27 NOTE — PATIENT INSTRUCTIONS

## 2018-12-10 DIAGNOSIS — M81.0 AGE-RELATED OSTEOPOROSIS WITHOUT CURRENT PATHOLOGICAL FRACTURE: Primary | ICD-10-CM

## 2018-12-13 ENCOUNTER — TELEPHONE (OUTPATIENT)
Dept: FAMILY MEDICINE CLINIC | Age: 78
End: 2018-12-13

## 2018-12-13 NOTE — TELEPHONE ENCOUNTER
Patients niece called today regarding a lab order for her aunt to have which she states is an requirement when she gets her prolia shot.  Please call her aunt back ay     [phone]

## 2018-12-14 ENCOUNTER — HOSPITAL ENCOUNTER (OUTPATIENT)
Dept: LAB | Age: 78
Discharge: HOME | End: 2018-12-14
Payer: MEDICARE

## 2018-12-14 DIAGNOSIS — M81.0 AGE-RELATED OSTEOPOROSIS WITHOUT CURRENT PATHOLOGICAL FRACTURE: ICD-10-CM

## 2018-12-14 LAB
ANION GAP SERPL CALC-SCNC: 5 MMOL/L (ref 3–18)
BUN SERPL-MCNC: 9 MG/DL (ref 7–18)
BUN/CREAT SERPL: 15 (ref 12–20)
CALCIUM SERPL-MCNC: 8.7 MG/DL (ref 8.5–10.1)
CHLORIDE SERPL-SCNC: 100 MMOL/L (ref 100–108)
CO2 SERPL-SCNC: 31 MMOL/L (ref 21–32)
CREAT SERPL-MCNC: 0.62 MG/DL (ref 0.6–1.3)
GLUCOSE SERPL-MCNC: 75 MG/DL (ref 74–99)
POTASSIUM SERPL-SCNC: 4 MMOL/L (ref 3.5–5.5)
SODIUM SERPL-SCNC: 136 MMOL/L (ref 136–145)

## 2018-12-14 PROCEDURE — 36415 COLL VENOUS BLD VENIPUNCTURE: CPT

## 2018-12-14 PROCEDURE — 80048 BASIC METABOLIC PNL TOTAL CA: CPT

## 2018-12-20 DIAGNOSIS — M81.0 OSTEOPOROSIS: ICD-10-CM

## 2019-01-28 ENCOUNTER — OFFICE VISIT (OUTPATIENT)
Dept: FAMILY MEDICINE CLINIC | Age: 79
End: 2019-01-28

## 2019-01-28 VITALS
SYSTOLIC BLOOD PRESSURE: 154 MMHG | DIASTOLIC BLOOD PRESSURE: 75 MMHG | OXYGEN SATURATION: 93 % | HEIGHT: 61 IN | WEIGHT: 86.6 LBS | TEMPERATURE: 96.3 F | BODY MASS INDEX: 16.35 KG/M2 | RESPIRATION RATE: 17 BRPM | HEART RATE: 93 BPM

## 2019-01-28 DIAGNOSIS — F32.1 MODERATE SINGLE CURRENT EPISODE OF MAJOR DEPRESSIVE DISORDER (HCC): ICD-10-CM

## 2019-01-28 DIAGNOSIS — I10 ESSENTIAL HYPERTENSION, BENIGN: ICD-10-CM

## 2019-01-28 DIAGNOSIS — R63.6 UNDERWEIGHT: ICD-10-CM

## 2019-01-28 DIAGNOSIS — J47.1 BRONCHIECTASIS WITH ACUTE EXACERBATION (HCC): Primary | ICD-10-CM

## 2019-01-28 RX ORDER — LEVOFLOXACIN 500 MG/1
500 TABLET, FILM COATED ORAL DAILY
Qty: 14 TAB | Refills: 0 | Status: SHIPPED | OUTPATIENT
Start: 2019-01-28 | End: 2019-02-11

## 2019-01-28 RX ORDER — BENZONATATE 100 MG/1
100 CAPSULE ORAL
Qty: 30 CAP | Refills: 0 | Status: SHIPPED | OUTPATIENT
Start: 2019-01-28 | End: 2020-02-24 | Stop reason: ALTCHOICE

## 2019-01-28 NOTE — PATIENT INSTRUCTIONS
Bronchiectasis: Care Instructions Your Care Instructions Bronchiectasis (say \"esywl-dxu-UFW-tuh-juan\") is a lung problem in which the breathing tubes are stretched and become larger. The buildup of mucus causes the stretching and can lead to swelling and infections. You may find it harder to breathe and cough up mucus out of your lungs. Some people are born with it. Other people get it because of another health problem, usually cystic fibrosis or a lung infection such as pneumonia or tuberculosis. Symptoms are often different for everyone. But a cough that brings up mucus, or sputum, is common. The condition is usually treated with antibiotics, medicines to relax the airways (bronchodilators), and medicines to make it easier to cough up mucus (expectorants). Follow-up care is a key part of your treatment and safety. Be sure to make and go to all appointments, and call your doctor if you are having problems. It's also a good idea to know your test results and keep a list of the medicines you take. How can you care for yourself at home? · Take your antibiotics as directed. Do not stop taking them just because you feel better. You need to take the full course of antibiotics. · Take your medicines exactly as prescribed. Call your doctor if you think you are having a problem with your medicine. · If you have cystic fibrosis, follow your treatment plan. · If you or your child has bronchiectasis, follow directions from your doctor or respiratory therapist for moving your or your child's body into different positions to help drain fluid. This is called postural drainage, and it helps to ease breathing and prevent infections. · You also may do chest percussion on your child. This is strong clapping of the chest with a cupped hand to vibrate the airways in the lungs. The vibration helps your child cough up mucus. You may see a respiratory therapist to learn how to do chest percussion. · Use an airway clearance device, such as a flutter valve, as directed to help remove mucus from the lungs. · Avoid lung infections. ? Get shots to protect against the flu and pneumococcal disease. ? Wash your hands frequently. ? Avoid close contact with people who have colds or the flu. ? Do not smoke or allow others to smoke around you. If you need help quitting, talk to your doctor about stop-smoking programs and medicines. These can increase your chances of quitting for good. ? Stay inside, if you can, on days when the pollution level is high. When should you call for help? Call 911 anytime you think you may need emergency care. For example, call if: 
  · You have severe trouble breathing.  
  · You cough up more than 1 cup of blood.  
 Call your doctor now or seek immediate medical care if: 
  · You have chest pain.  
  · You have shortness of breath.  
  · You have a fever.  
  · Your mucus (sputum) is bloody or changes color.  
 Watch closely for changes in your health, and be sure to contact your doctor if: 
  · You are coughing up more sputum than before.  
  · Your symptoms get worse or do not get better with treatment. Where can you learn more? Go to http://mandy-rossy.info/. Enter C667 in the search box to learn more about \"Bronchiectasis: Care Instructions. \" Current as of: September 5, 2018 Content Version: 11.9 © 2690-1321 [employer]. Care instructions adapted under license by [employer] (which disclaims liability or warranty for this information). If you have questions about a medical condition or this instruction, always ask your healthcare professional. [place] [address] any warranty or liability for your use of this information.

## 2019-01-28 NOTE — PROGRESS NOTES
Chief Complaint Patient presents with  Cold Symptoms x3 days productive wtih white mucus 1. Have you been to the ER, urgent care clinic since your last visit? Hospitalized since your last visit? No 
 
2. Have you seen or consulted any other health care providers outside of the [address] [place] [address] since your last visit? Include any pap smears or colon screening.  No

## 2019-01-28 NOTE — PROGRESS NOTES
Chief Complaint Patient presents with  Cold Symptoms x3 days productive wtih white mucus Pt is a 77y.o. year old female who presents for an acute visit for the above sxs Health Maintenance Due Topic Date Due  Shingrix Vaccine Age 50> (1 of 2) 04/04/1990 Wt Readings from Last 3 Encounters:  
01/28/19 86 lb 9.6 oz (39.3 kg)  
11/27/18 86 lb (39 kg) 08/17/18 87 lb 3.2 oz (39.6 kg) Present sxs started 3 days ago-bad cough, voice hoarseness; whitish phlegm, No fever Had her flu shot Took OTC meds? Yes/does not recall the names Still on tx for MYRIAM?still on tx Depression-on Remeron BP Readings from Last 3 Encounters:  
01/28/19 154/75  
11/27/18 140/64  
08/17/18 142/65 Repeat BP-still elevated On Norvasc 5 mg daily ROS: 
 
Pt denies: Wt loss, Fever/Chills, HA, Visual changes, Fatigue, Chest pain, SOB, KUMAR, Abd pain, N/V/D/C, Blood in stool or urine, Edema. Pertinent positive as above in HPI. All others were negative Patient Active Problem List  
Diagnosis Code  Hyperlipidemia E78.5  Essential hypertension, benign I10  Vitamin D deficiency E55.9  Advance directive discussed with patient Z70.80  
 Moderate single current episode of major depressive disorder ([facility] F32.1  Age-related osteoporosis without current pathological fracture M81.0  Patient underweight R63.6  Bronchiectasis with acute exacerbation ([address] J47.1  MYRIAM (mycobacterium avium-intracellulare) infection ([address] A31.0 Past Medical History:  
Diagnosis Date  Hypercholesterolemia   
 high in past / was on meds at that time but now off  MYRIAM (mycobacterium avium-intracellulare) infection ([address] treated and released by Pulmonary Current Outpatient Medications Medication Sig Dispense Refill  denosumab (PROLIA) 60 mg/mL injection Once every 6 months 1 mL 2  
 omeprazole (PRILOSEC) 40 mg capsule Take 1 Cap by mouth daily.  90 Cap 1  
  ethambutol (MYAMBUTOL) 400 mg tablet Take 800 mg by mouth every Monday, Wednesday, Friday.  rifAMPin (RIFADIN) 300 mg capsule Take 600 mg by mouth every Monday, Wednesday, Friday.  mirtazapine (REMERON) 30 mg tablet Take 1 Tab by mouth nightly. 90 Tab 1  
 simvastatin (ZOCOR) 20 mg tablet TAKE 1 TAB BY MOUTH NIGHTLY. 90 Tab 3  
 amLODIPine (NORVASC) 5 mg tablet Take 1 Tab by mouth daily. 90 Tab 3  
 CALCIUM PHOSPHATE TRIB/VIT D3 (CITRACAL + D PO) Take  by mouth.  flaxseed oil 1,000 mg Cap Take  by mouth.  LYCOPENE PO Take  by mouth.  red yeast rice extract 600 mg Cap Take 600 mg by mouth now. Social History Tobacco Use Smoking Status Never Smoker Smokeless Tobacco Never Used No Known Allergies Patient Labs were reviewed: yes Patient Past Records were reviewed:  yes Objective:  
 
Vitals:  
 01/28/19 1007 BP: 154/75 Pulse: 93 Resp: 17 Temp: 96.3 °F (35.7 °C) TempSrc: Oral  
SpO2: 93% Weight: 86 lb 9.6 oz (39.3 kg) Height: 5' 1\" (1.549 m) Body mass index is 16.36 kg/m². Exam:  
Appearance: alert, well appearing,  oriented to person, place, and time, acyanotic, in no respiratory distress and well hydrated. HEENT:  NC/AT, pink conj, anicteric sclerae Neck:  No cervical lymphadenopathy, no JVD, no thyromegaly, no carotid bruit Heart:  RRR without M/R/G Lungs:  Crackles heard on the left lower lung base Abdomen:  Non-tender, pos bowel sounds, no hepatosplenomegaly Ext:  No C/C/E Skin: no rash Neuro: no lateralizing signs, CNs II-XII intact Assessment/ Plan:  
Diagnoses and all orders for this visit: 1. Bronchiectasis with acute exacerbation (HCC)-need to rule out pneumonia -     levoFLOXacin (LEVAQUIN) 500 mg tablet; Take 1 Tab by mouth daily for 14 days. -     XR CHEST PA LAT; Future 
-     benzonatate (TESSALON) 100 mg capsule; Take 1 Cap by mouth three (3) times daily as needed for Cough. 2. Essential hypertension, benign-elevated today; ?from OTC cough meds; recheck next visit and will increase dose of Norvasc if still above 150 3. Moderate single current episode of major depressive disorder (HCC)-on Remeron 4. Underweight-advised to try Boost with meals Follow-up Disposition: 
Return if symptoms worsen or fail to improve, for previous appt. I have discussed the diagnosis with the patient and the intended plan as seen in the above orders. The patient has received an After-Visit Summary and questions were answered concerning future plans. Medication Side Effects and Warnings were discussed with patient: yes Patient verbalized understanding of above instructions. Nir Mckay MD 
Internal Medicine [address] [facility]

## 2019-01-30 ENCOUNTER — TELEPHONE (OUTPATIENT)
Dept: FAMILY MEDICINE CLINIC | Age: 79
End: 2019-01-30

## 2019-02-12 DIAGNOSIS — J47.1 BRONCHIECTASIS WITH ACUTE EXACERBATION (HCC): ICD-10-CM

## 2019-03-01 ENCOUNTER — OFFICE VISIT (OUTPATIENT)
Dept: FAMILY MEDICINE CLINIC | Age: 79
End: 2019-03-01

## 2019-03-01 VITALS
RESPIRATION RATE: 12 BRPM | HEART RATE: 70 BPM | DIASTOLIC BLOOD PRESSURE: 66 MMHG | WEIGHT: 86 LBS | SYSTOLIC BLOOD PRESSURE: 147 MMHG | TEMPERATURE: 95.7 F | HEIGHT: 61 IN | BODY MASS INDEX: 16.24 KG/M2

## 2019-03-01 DIAGNOSIS — A31.0 MAI (MYCOBACTERIUM AVIUM-INTRACELLULARE) INFECTION (HCC): ICD-10-CM

## 2019-03-01 DIAGNOSIS — M81.0 AGE-RELATED OSTEOPOROSIS WITHOUT CURRENT PATHOLOGICAL FRACTURE: ICD-10-CM

## 2019-03-01 DIAGNOSIS — R60.9 EDEMA, UNSPECIFIED TYPE: ICD-10-CM

## 2019-03-01 DIAGNOSIS — I10 ESSENTIAL HYPERTENSION, BENIGN: Primary | ICD-10-CM

## 2019-03-01 DIAGNOSIS — K21.9 GASTROESOPHAGEAL REFLUX DISEASE, ESOPHAGITIS PRESENCE NOT SPECIFIED: ICD-10-CM

## 2019-03-01 DIAGNOSIS — F32.1 MODERATE SINGLE CURRENT EPISODE OF MAJOR DEPRESSIVE DISORDER (HCC): ICD-10-CM

## 2019-03-01 DIAGNOSIS — E78.5 HYPERLIPIDEMIA, UNSPECIFIED HYPERLIPIDEMIA TYPE: ICD-10-CM

## 2019-03-01 RX ORDER — MIRTAZAPINE 30 MG/1
30 TABLET, FILM COATED ORAL
Qty: 90 TAB | Refills: 1 | Status: SHIPPED | OUTPATIENT
Start: 2019-03-01 | End: 2019-08-19 | Stop reason: DRUGHIGH

## 2019-03-01 NOTE — PATIENT INSTRUCTIONS
Gastroesophageal Reflux Disease (GERD): Care Instructions Your Care Instructions Gastroesophageal reflux disease (GERD) is the backward flow of stomach acid into the esophagus. The esophagus is the tube that leads from your throat to your stomach. A one-way valve prevents the stomach acid from moving up into this tube. When you have GERD, this valve does not close tightly enough. If you have mild GERD symptoms including heartburn, you may be able to control the problem with antacids or over-the-counter medicine. Changing your diet, losing weight, and making other lifestyle changes can also help reduce symptoms. Follow-up care is a key part of your treatment and safety. Be sure to make and go to all appointments, and call your doctor if you are having problems. It's also a good idea to know your test results and keep a list of the medicines you take. How can you care for yourself at home? · Take your medicines exactly as prescribed. Call your doctor if you think you are having a problem with your medicine. · Your doctor may recommend over-the-counter medicine. For mild or occasional indigestion, antacids, such as Tums, Gaviscon, Mylanta, or Maalox, may help. Your doctor also may recommend over-the-counter acid reducers, such as Pepcid AC, Tagamet HB, Zantac 75, or Prilosec. Read and follow all instructions on the label. If you use these medicines often, talk with your doctor. · Change your eating habits. ? It's best to eat several small meals instead of two or three large meals. ? After you eat, wait 2 to 3 hours before you lie down. ? Chocolate, mint, and alcohol can make GERD worse. ? Spicy foods, foods that have a lot of acid (like tomatoes and oranges), and coffee can make GERD symptoms worse in some people. If your symptoms are worse after you eat a certain food, you may want to stop eating that food to see if your symptoms get better. · Do not smoke or chew tobacco. Smoking can make GERD worse. If you need help quitting, talk to your doctor about stop-smoking programs and medicines. These can increase your chances of quitting for good. · If you have GERD symptoms at night, raise the head of your bed 6 to 8 inches by putting the frame on blocks or placing a foam wedge under the head of your mattress. (Adding extra pillows does not work.) · Do not wear tight clothing around your middle. · Lose weight if you need to. Losing just 5 to 10 pounds can help. When should you call for help? Call your doctor now or seek immediate medical care if: 
  · You have new or different belly pain.  
  · Your stools are black and tarlike or have streaks of blood.  
 Watch closely for changes in your health, and be sure to contact your doctor if: 
  · Your symptoms have not improved after 2 days.  
  · Food seems to catch in your throat or chest.  
Where can you learn more? Go to http://mandy-rossy.info/. Enter C297 in the search box to learn more about \"Gastroesophageal Reflux Disease (GERD): Care Instructions. \" Current as of: March 27, 2018 Content Version: 11.9 © 7681-1690 [employer]. Care instructions adapted under license by [employer] (which disclaims liability or warranty for this information). If you have questions about a medical condition or this instruction, always ask your healthcare professional. [place] [address] any warranty or liability for your use of this information.

## 2019-03-01 NOTE — PROGRESS NOTES
Chief Complaint Patient presents with  Follow Up Chronic Condition  Medication Refill  
  pended Pt is a 77y.o. year old female who presents for follow up of her chronic medical problems Here with son Cough from last visit is better Wt Readings from Last 3 Encounters:  
03/01/19 86 lb (39 kg) 01/28/19 86 lb 9.6 oz (39.3 kg)  
11/27/18 86 lb (39 kg) Advised on Boost/Ensure as supplement BP Readings from Last 3 Encounters:  
03/01/19 147/66  
01/28/19 154/75  
11/27/18 140/64 Repeat BP-still with slightly elevated sBP; compliant with BP meds Feet and ankle swell at night; better when she wakes up Walking would make it go down Still on med for MAC Saw ID, Dr Mariah Tucker 40 mg Prilosec after Ba swallow showed mod to large reflux-thought to contribute to her MAC BARIUM [id number]/26/2018 [employer] Result Impression 1.  Moderate to large gastroesophageal reflux. 2.  Otherwise unremarkable barium swallow. Needs refill of med for HTN/depression ROS: 
 
Pt denies: Wt loss, Fever/Chills, HA, Visual changes, Fatigue, Chest pain, SOB, KUMAR, Abd pain, N/V/D/C, Blood in stool or urine, Edema. Pertinent positive as above in HPI. All others were negative Patient Active Problem List  
Diagnosis Code  Hyperlipidemia E78.5  Essential hypertension, benign I10  Vitamin D deficiency E55.9  Advance directive discussed with patient Z70.80  
 Moderate single current episode of major depressive disorder ([facility] [address] F32.1  Age-related osteoporosis without current pathological fracture M81.0  Patient underweight R63.6  Bronchiectasis with acute exacerbation ([address] J47.1  MYRIAM (mycobacterium avium-intracellulare) infection ([address] A31.0  Gastroesophageal reflux disease K21.9 Past Medical History:  
Diagnosis Date  Hypercholesterolemia   
 high in past / was on meds at that time but now off  MYRIAM (mycobacterium avium-intracellulare) infection ([address] treated and released by Pulmonary Current Outpatient Medications Medication Sig Dispense Refill  mirtazapine (REMERON) 30 mg tablet Take 1 Tab by mouth nightly. 90 Tab 1  
 denosumab (PROLIA) 60 mg/mL injection Once every 6 months 1 mL 2  
 omeprazole (PRILOSEC) 40 mg capsule Take 1 Cap by mouth daily. 90 Cap 1  
 ethambutol (MYAMBUTOL) 400 mg tablet Take 800 mg by mouth every Monday, Wednesday, Friday.  rifAMPin (RIFADIN) 300 mg capsule Take 300 mg by mouth every Monday, Wednesday, Friday.  simvastatin (ZOCOR) 20 mg tablet TAKE 1 TAB BY MOUTH NIGHTLY. 90 Tab 3  
 amLODIPine (NORVASC) 5 mg tablet Take 1 Tab by mouth daily. 90 Tab 3  
 CALCIUM PHOSPHATE TRIB/VIT D3 (CITRACAL + D PO) Take  by mouth.  flaxseed oil 1,000 mg Cap Take  by mouth.  red yeast rice extract 600 mg Cap Take 600 mg by mouth now.  benzonatate (TESSALON) 100 mg capsule Take 1 Cap by mouth three (3) times daily as needed for Cough. 30 Cap 0  
 LYCOPENE PO Take  by mouth. Social History Tobacco Use Smoking Status Never Smoker Smokeless Tobacco Never Used No Known Allergies Patient Labs were reviewed: yes Patient Past Records were reviewed:  yes Objective:  
 
Vitals:  
 03/01/19 1011 BP: 147/66 Pulse: 70 Resp: 12 Temp: 95.7 °F (35.4 °C) TempSrc: Oral  
Weight: 86 lb (39 kg) Height: 5' 1\" (1.549 m) Body mass index is 16.25 kg/m². Exam:  
Appearance: alert, well appearing,  oriented to person, place, and time, acyanotic, in no respiratory distress and well hydrated. HEENT:  NC/AT, pink conj, anicteric sclerae Neck:  No cervical lymphadenopathy, no JVD, no thyromegaly, no carotid bruit Heart:  RRR without M/R/G Lungs:  CTAB, no rhonchi, rales, or wheezes with good air exchange Abdomen:  Non-tender, pos bowel sounds, no hepatosplenomegaly Ext:  No C/C/E Skin: no rash Neuro: no lateralizing signs, CNs II-XII intact Assessment/ Plan: Diagnoses and all orders for this visit: 1. Essential hypertension, benign-will accept SBP in the 140-150 range for her age and degree of fraility 2. Edema, unspecified type-likely positional; advised to elevate legs/pressure stockings; if persistent, will change Norvasc 3. Moderate single current episode of major depressive disorder (HCC)-in remission on Remeron 
-     mirtazapine (REMERON) 30 mg tablet; Take 1 Tab by mouth nightly. 4. Gastroesophageal reflux disease, esophagitis presence not specified-advised to elevate head of bed at night; continue with daily PPI 5. Hyperlipidemia, unspecified hyperlipidemia type-at goal on Zocor 6. MYRIAM (mycobacterium avium-intracellulare) infection (HCC)-ID following 7. Age-related osteoporosis without current pathological fracture-doing well on Prolia since 12/2017; will order repeat Dexa next visit BONE DENSITY STUDY - CENTRAL8/28/2014 [employer] Result Impression Impression: 1.  BMD MEASURES  CONSISTENT WITH OSTEOPOROSIS AND INCREASED FRACTURE RISK. Follow-up Disposition: 
Return in about 3 months (around 6/1/2019) for follow up. I have discussed the diagnosis with the patient and the intended plan as seen in the above orders. The patient has received an After-Visit Summary and questions were answered concerning future plans. Medication Side Effects and Warnings were discussed with patient: yes Patient verbalized understanding of above instructions. Radames Voss MD 
Internal Medicine [address] [facility]

## 2019-03-01 NOTE — PROGRESS NOTES
1. Have you been to the ER, urgent care clinic since your last visit? Hospitalized since your last visit? No 
 
2. Have you seen or consulted any other health care providers outside of the [address] [place] [address] since your last visit? Include any pap smears or colon screening.  No

## 2019-06-11 DIAGNOSIS — E78.5 HYPERLIPIDEMIA, UNSPECIFIED HYPERLIPIDEMIA TYPE: ICD-10-CM

## 2019-06-11 RX ORDER — SIMVASTATIN 20 MG/1
TABLET, FILM COATED ORAL
Qty: 90 TAB | Refills: 2 | Status: SHIPPED | OUTPATIENT
Start: 2019-06-11 | End: 2020-02-24 | Stop reason: SDUPTHER

## 2019-06-17 ENCOUNTER — OFFICE VISIT (OUTPATIENT)
Dept: FAMILY MEDICINE CLINIC | Age: 79
End: 2019-06-17

## 2019-06-17 VITALS
WEIGHT: 83.4 LBS | RESPIRATION RATE: 15 BRPM | BODY MASS INDEX: 15.75 KG/M2 | HEIGHT: 61 IN | SYSTOLIC BLOOD PRESSURE: 142 MMHG | DIASTOLIC BLOOD PRESSURE: 72 MMHG | OXYGEN SATURATION: 92 % | HEART RATE: 84 BPM | TEMPERATURE: 96.5 F

## 2019-06-17 DIAGNOSIS — I10 ESSENTIAL HYPERTENSION, BENIGN: Primary | ICD-10-CM

## 2019-06-17 DIAGNOSIS — M81.0 AGE-RELATED OSTEOPOROSIS WITHOUT CURRENT PATHOLOGICAL FRACTURE: ICD-10-CM

## 2019-06-17 DIAGNOSIS — A31.0 MAI (MYCOBACTERIUM AVIUM-INTRACELLULARE) INFECTION (HCC): ICD-10-CM

## 2019-06-17 DIAGNOSIS — J47.0 BRONCHIECTASIS WITH ACUTE LOWER RESPIRATORY INFECTION (HCC): ICD-10-CM

## 2019-06-17 DIAGNOSIS — R63.6 PATIENT UNDERWEIGHT: ICD-10-CM

## 2019-06-17 DIAGNOSIS — K21.9 GASTROESOPHAGEAL REFLUX DISEASE, ESOPHAGITIS PRESENCE NOT SPECIFIED: ICD-10-CM

## 2019-06-17 RX ORDER — AMLODIPINE BESYLATE 5 MG/1
5 TABLET ORAL DAILY
Qty: 90 TAB | Refills: 3 | Status: SHIPPED | OUTPATIENT
Start: 2019-06-17 | End: 2020-02-24 | Stop reason: DRUGHIGH

## 2019-06-17 RX ORDER — ERYTHROMYCIN 500 MG/1
TABLET, COATED ORAL ONCE
COMMUNITY
End: 2020-02-24 | Stop reason: ALTCHOICE

## 2019-06-17 NOTE — PROGRESS NOTES
Chief Complaint   Patient presents with    Follow Up Chronic Condition     3 month; patient not fasting       Pt is a [de-identified]y.o. year old female who presents for follow up of her chronic medical problems  Here with her son as she speaks mostly [address] Maintenance Due   Topic Date Due    Shingrix Vaccine Age 50> (1 of 2)-advised tog et this at her pharmacy 04/04/1989       BP Readings from Last 3 Encounters:   06/17/19 142/72   03/01/19 147/66   01/28/19 154/75    repeat BP-same    Wt Readings from Last 3 Encounters:   06/17/19 83 lb 6.4 oz (37.8 kg)   03/01/19 86 lb (39 kg)   01/28/19 86 lb 9.6 oz (39.3 kg)   Takes Ensure once a day-too sweet she says    Lab Results   Component Value Date/Time    Cholesterol, total 187 06/11/2018 02:00 PM    HDL Cholesterol 91 (H) 06/11/2018 02:00 PM    LDL, calculated 84 06/11/2018 02:00 PM    VLDL, calculated 12 06/11/2018 02:00 PM    Triglyceride 60 06/11/2018 02:00 PM    CHOL/HDL Ratio 2.1 06/11/2018 02:00 PM   On Zocor-compliant    Has not been on Prolia-due this month; has had at least 5 doses, needs follow up Niranjan MIGUEL-still undergoing treatment 1 yr now in August  Started coughing again in late April (coughs up phlegm per son)  ID wanted her to see GI    Saw PA at GI-had Ba swallow-mod to severe reflux, restarted Prilosec now at BID dose    Has not seen Pulmo since Dr Myles Neighbours reviewed-last seen 9/2018  Patient says she had inhaler in the past and it did not work      ROS:    Pt denies: Wt loss, Fever/Chills, HA, Visual changes, Fatigue, Chest pain, SOB, KUMAR, Abd pain, N/V/D/C, Blood in stool or urine, Edema. Pertinent positive as above in HPI.  All others were negative    Patient Active Problem List   Diagnosis Code    Hyperlipidemia E78.5    Essential hypertension, benign I10    Vitamin D deficiency E55.9    Advance directive discussed with patient Z70.80    Moderate single current episode of major depressive disorder ([facility] [address] F32.1    Age-related osteoporosis without current pathological fracture M81.0    Patient underweight R63.6    Bronchiectasis with acute exacerbation (HCC) J47.1    MYRIAM (mycobacterium avium-intracellulare) infection ([facility] [address] A31.0    Gastroesophageal reflux disease K21.9       Past Medical History:   Diagnosis Date    Hypercholesterolemia     high in past / was on meds at that time but now off    MYRIAM (mycobacterium avium-intracellulare) infection ([facility] [address]     treated and released by Pulmonary       Current Outpatient Medications   Medication Sig Dispense Refill    erythromycin 500 mg tablet Take  by mouth once. Monday, Wednesday, Friday      amLODIPine (NORVASC) 5 mg tablet Take 1 Tab by mouth daily. 90 Tab 3    simvastatin (ZOCOR) 20 mg tablet TAKE 1 TABLET BY MOUTH EACH NIGHT 90 Tab 2    mirtazapine (REMERON) 30 mg tablet Take 1 Tab by mouth nightly. 90 Tab 1    benzonatate (TESSALON) 100 mg capsule Take 1 Cap by mouth three (3) times daily as needed for Cough. 30 Cap 0    omeprazole (PRILOSEC) 40 mg capsule Take 1 Cap by mouth daily. (Patient taking differently: Take 40 mg by mouth two (2) times a day.) 90 Cap 1    ethambutol (MYAMBUTOL) 400 mg tablet Take 800 mg by mouth every Monday, Wednesday, Friday.  rifAMPin (RIFADIN) 300 mg capsule Take 300 mg by mouth every Monday, Wednesday, Friday.  CALCIUM PHOSPHATE TRIB/VIT D3 (CITRACAL + D PO) Take  by mouth.  flaxseed oil 1,000 mg Cap Take  by mouth.  LYCOPENE PO Take  by mouth.  red yeast rice extract 600 mg Cap Take 600 mg by mouth now.       denosumab (PROLIA) 60 mg/mL injection Once every 6 months 1 mL 2       Social History     Tobacco Use   Smoking Status Never Smoker   Smokeless Tobacco Never Used       No Known Allergies    Patient Labs were reviewed: yes      Patient Past Records were reviewed:  yes        Objective:     Vitals:    06/17/19 1043   BP: 142/72   Pulse: 84   Resp: 15   Temp: 96.5 °F (35.8 °C)   TempSrc: Oral   SpO2: 92%   Weight: 83 lb 6.4 oz (37.8 kg)   Height: 5' 1\" (1.549 m)     Body mass index is 15.76 kg/m². Exam:   Appearance: alert, well appearing but very frail,  oriented to person, place, and time, acyanotic, in no respiratory distress and well hydrated. coughing  HEENT:  NC/AT, pink conj, anicteric sclerae  Neck:  No cervical lymphadenopathy, no JVD, no thyromegaly, no carotid bruit  Heart:  RRR without M/R/G  Lungs:  CTAB, no rhonchi, rales, or wheezes with good air exchange   Abdomen:  Non-tender, pos bowel sounds, no hepatosplenomegaly  Ext:  No C/C/E    Skin: no rash  Neuro: no lateralizing signs, CNs II-XII intact      Assessment/ Plan:   Diagnoses and all orders for this visit:    1. Essential hypertension, benign-continue with present meds; will accept SBP bet 140-150 with her age and degree of frailty  -     amLODIPine (NORVASC) 5 mg tablet; Take 1 Tab by mouth daily. 2. Bronchiectasis with acute lower respiratory infection (HCC)-advised son to make an appt with Pulmo; may benefit from an inhaler with spacer device  -     CT CHEST WO CONT; Future    3. Gastroesophageal reflux disease, esophagitis presence not specified-Gi following, discussec lifestyle changes, continue with BID Prilosec ( started just about 1 week ago)    4. MYRIAM (mycobacterium avium-intracellulare) infection (HCC)-currently under tx with ID    5. Age-related osteoporosis without current pathological fracture-Prolia shot this month  -     DEXA BONE DENSITY STUDY AXIAL; Future    6. Patient underweight-advised to take Glucerna (less sweet) instead and to take it BID        Follow-up and Dispositions    · Return in about 3 months (around 9/17/2019) for follow up. I have discussed the diagnosis with the patient and the intended plan as seen in the above orders. The patient has received an After-Visit Summary and questions were answered concerning future plans.      Medication Side Effects and Warnings were discussed with patient: yes    Patient verbalized understanding of above instructions.     Colonel Oscar MD  Internal Medicine  [facility]

## 2019-06-17 NOTE — PATIENT INSTRUCTIONS
Bronchiectasis: Care Instructions  Your Care Instructions  Bronchiectasis (say \"fqwtb-ynf-RTI-tuh-juan\") is a lung problem in which the breathing tubes are stretched and become larger. The buildup of mucus causes the stretching and can lead to swelling and infections. You may find it harder to breathe and cough up mucus out of your lungs. Some people are born with it. Other people get it because of another health problem, usually cystic fibrosis or a lung infection such as pneumonia or tuberculosis. Symptoms are often different for everyone. But a cough that brings up mucus, or sputum, is common. The condition is usually treated with antibiotics, medicines to relax the airways (bronchodilators), and medicines to make it easier to cough up mucus (expectorants). Follow-up care is a key part of your treatment and safety. Be sure to make and go to all appointments, and call your doctor if you are having problems. It's also a good idea to know your test results and keep a list of the medicines you take. How can you care for yourself at home? · Take your antibiotics as directed. Do not stop taking them just because you feel better. You need to take the full course of antibiotics. · Take your medicines exactly as prescribed. Call your doctor if you think you are having a problem with your medicine. · If you have cystic fibrosis, follow your treatment plan. · If you or your child has bronchiectasis, follow directions from your doctor or respiratory therapist for moving your or your child's body into different positions to help drain fluid. This is called postural drainage, and it helps to ease breathing and prevent infections. · You also may do chest percussion on your child. This is strong clapping of the chest with a cupped hand to vibrate the airways in the lungs. The vibration helps your child cough up mucus. You may see a respiratory therapist to learn how to do chest percussion.   · Use an airway clearance device, such as a flutter valve, as directed to help remove mucus from the lungs. · Avoid lung infections. ? Get shots to protect against the flu and pneumococcal disease. ? Wash your hands frequently. ? Avoid close contact with people who have colds or the flu. ? Do not smoke or allow others to smoke around you. If you need help quitting, talk to your doctor about stop-smoking programs and medicines. These can increase your chances of quitting for good. ? Stay inside, if you can, on days when the pollution level is high. When should you call for help? Call 911 anytime you think you may need emergency care. For example, call if:    · You have severe trouble breathing.     · You cough up more than 1 cup of blood.    Call your doctor now or seek immediate medical care if:    · You have chest pain.     · You have shortness of breath.     · You have a fever.     · Your mucus (sputum) is bloody or changes color.    Watch closely for changes in your health, and be sure to contact your doctor if:    · You are coughing up more sputum than before.     · Your symptoms get worse or do not get better with treatment. Where can you learn more? Go to http://mandy-rossy.info/. Enter C667 in the search box to learn more about \"Bronchiectasis: Care Instructions. \"  Current as of: September 5, 2018  Content Version: 11.9  © 1078-3066 [employer]. Care instructions adapted under license by [employer] (which disclaims liability or warranty for this information). If you have questions about a medical condition or this instruction, always ask your healthcare professional. [place] [address] any warranty or liability for your use of this information.

## 2019-06-17 NOTE — PROGRESS NOTES
Chief Complaint   Patient presents with    Follow Up Chronic Condition     3 month     1. Have you been to the ER, urgent care clinic since your last visit? Hospitalized since your last visit? Yes Where: [facility]    2. Have you seen or consulted any other health care providers outside of the [address] [place] [address] since your last visit? Include any pap smears or colon screening.  Yes Where: GI-visit

## 2019-07-22 ENCOUNTER — TELEPHONE (OUTPATIENT)
Dept: FAMILY MEDICINE CLINIC | Age: 79
End: 2019-07-22

## 2019-07-22 NOTE — LETTER
7/22/2019 3:13 PM 
 
[address] [place] [id number] We received your bone density results. Dr. Merari Wheat wanted to notify you that your results show Osteoporosis, but your bone density has improved since your previous study. Please continue with Prolia injections. Please call our office if you have any questions. Sincerely, Kelby Hutchins MD

## 2019-07-22 NOTE — TELEPHONE ENCOUNTER
Spoke with patient, she stated she doesn't understand English and to please send her results in a letter. Letter with results printed and mailed to patient.

## 2019-07-24 DIAGNOSIS — J47.0 BRONCHIECTASIS WITH ACUTE LOWER RESPIRATORY INFECTION (HCC): ICD-10-CM

## 2019-07-25 DIAGNOSIS — M81.0 AGE-RELATED OSTEOPOROSIS WITHOUT CURRENT PATHOLOGICAL FRACTURE: Primary | ICD-10-CM

## 2019-07-26 DIAGNOSIS — M81.0 AGE-RELATED OSTEOPOROSIS WITHOUT CURRENT PATHOLOGICAL FRACTURE: ICD-10-CM

## 2019-08-02 ENCOUNTER — HOSPITAL ENCOUNTER (OUTPATIENT)
Dept: LAB | Age: 79
Discharge: HOME | End: 2019-08-02
Payer: MEDICARE

## 2019-08-02 DIAGNOSIS — M81.0 AGE-RELATED OSTEOPOROSIS WITHOUT CURRENT PATHOLOGICAL FRACTURE: ICD-10-CM

## 2019-08-02 LAB
ALBUMIN SERPL-MCNC: 3.7 G/DL (ref 3.4–5)
ALBUMIN/GLOB SERPL: 1.1 {RATIO} (ref 0.8–1.7)
ALP SERPL-CCNC: 39 U/L (ref 45–117)
ALT SERPL-CCNC: 17 U/L (ref 13–56)
ANION GAP SERPL CALC-SCNC: 7 MMOL/L (ref 3–18)
AST SERPL-CCNC: 20 U/L (ref 10–38)
BILIRUB SERPL-MCNC: 0.5 MG/DL (ref 0.2–1)
BUN SERPL-MCNC: 13 MG/DL (ref 7–18)
BUN/CREAT SERPL: 19 (ref 12–20)
CALCIUM SERPL-MCNC: 8.6 MG/DL (ref 8.5–10.1)
CHLORIDE SERPL-SCNC: 97 MMOL/L (ref 100–111)
CO2 SERPL-SCNC: 29 MMOL/L (ref 21–32)
CREAT SERPL-MCNC: 0.67 MG/DL (ref 0.6–1.3)
GLOBULIN SER CALC-MCNC: 3.3 G/DL (ref 2–4)
GLUCOSE SERPL-MCNC: 78 MG/DL (ref 74–99)
POTASSIUM SERPL-SCNC: 4.3 MMOL/L (ref 3.5–5.5)
PROT SERPL-MCNC: 7 G/DL (ref 6.4–8.2)
SODIUM SERPL-SCNC: 133 MMOL/L (ref 136–145)

## 2019-08-02 PROCEDURE — 36415 COLL VENOUS BLD VENIPUNCTURE: CPT

## 2019-08-02 PROCEDURE — 80053 COMPREHEN METABOLIC PANEL: CPT

## 2019-08-05 NOTE — PROGRESS NOTES
Calcium level is normal so patient can go ahead and have her next prolia shot-she usually gets this done at [facility] do we go about and order this for her

## 2019-08-05 NOTE — PROGRESS NOTES
Spoke with patients son. Patient stated that he is not sure how we order it. He stated the last time she had the shot they were informed to go to [facility] as they are one of the few places that carry it.

## 2019-08-12 ENCOUNTER — TELEPHONE (OUTPATIENT)
Dept: FAMILY MEDICINE CLINIC | Age: 79
End: 2019-08-12

## 2019-08-12 NOTE — TELEPHONE ENCOUNTER
Son calling to check the status of the patient order for a Prolio injection. Fax to the [address] [place] [address] at [phone]. Call [phone] once complete.

## 2019-08-19 ENCOUNTER — OFFICE VISIT (OUTPATIENT)
Dept: FAMILY MEDICINE CLINIC | Age: 79
End: 2019-08-19

## 2019-08-19 VITALS
WEIGHT: 81 LBS | DIASTOLIC BLOOD PRESSURE: 73 MMHG | HEIGHT: 61 IN | TEMPERATURE: 98.2 F | HEART RATE: 80 BPM | OXYGEN SATURATION: 93 % | BODY MASS INDEX: 15.29 KG/M2 | RESPIRATION RATE: 18 BRPM | SYSTOLIC BLOOD PRESSURE: 138 MMHG

## 2019-08-19 DIAGNOSIS — A31.0 MAI (MYCOBACTERIUM AVIUM-INTRACELLULARE) INFECTION (HCC): ICD-10-CM

## 2019-08-19 DIAGNOSIS — M81.0 AGE-RELATED OSTEOPOROSIS WITHOUT CURRENT PATHOLOGICAL FRACTURE: ICD-10-CM

## 2019-08-19 DIAGNOSIS — K21.9 GASTROESOPHAGEAL REFLUX DISEASE, ESOPHAGITIS PRESENCE NOT SPECIFIED: ICD-10-CM

## 2019-08-19 DIAGNOSIS — R63.6 PATIENT UNDERWEIGHT: ICD-10-CM

## 2019-08-19 DIAGNOSIS — F32.1 MODERATE SINGLE CURRENT EPISODE OF MAJOR DEPRESSIVE DISORDER (HCC): ICD-10-CM

## 2019-08-19 DIAGNOSIS — J47.9 BRONCHIECTASIS WITHOUT COMPLICATION (HCC): ICD-10-CM

## 2019-08-19 DIAGNOSIS — Z71.89 ADVANCE DIRECTIVE DISCUSSED WITH PATIENT: ICD-10-CM

## 2019-08-19 DIAGNOSIS — Z00.00 MEDICARE ANNUAL WELLNESS VISIT, SUBSEQUENT: Primary | ICD-10-CM

## 2019-08-19 RX ORDER — MIRTAZAPINE 45 MG/1
45 TABLET, FILM COATED ORAL
Qty: 90 TAB | Refills: 1 | Status: SHIPPED | OUTPATIENT
Start: 2019-08-19 | End: 2020-02-24 | Stop reason: SDUPTHER

## 2019-08-19 RX ORDER — RANITIDINE 150 MG/1
150 TABLET, FILM COATED ORAL 2 TIMES DAILY
COMMUNITY
End: 2020-02-24 | Stop reason: ALTCHOICE

## 2019-08-19 RX ORDER — DEXLANSOPRAZOLE 30 MG/1
30 CAPSULE, DELAYED RELEASE ORAL DAILY
Qty: 30 CAP | Refills: 3 | Status: SHIPPED | OUTPATIENT
Start: 2019-08-19 | End: 2020-08-20

## 2019-08-19 NOTE — PROGRESS NOTES
1. Have you been to the ER, urgent care clinic or hospitalized since your last visit? NO.     2. Have you seen or consulted any other health care providers outside of the [address] [place] [address] since your last visit (Include any pap smears or colon screening)? YES  Infectious Disease- Dr. Nadir Norton, Dr. Tremayne Carrion. Do you have an Advanced Directive? YES    Would you like information on Advanced Directives?  NO    Health Maintenance Due   Topic Date Due    Shingrix Vaccine Age 49> (1 of 2) 04/04/1989    Influenza Age 5 to Adult  08/01/2019    MEDICARE YEARLY EXAM  08/18/2019

## 2019-08-19 NOTE — PATIENT INSTRUCTIONS
Medicare Wellness Visit, Female     The best way to live healthy is to have a lifestyle where you eat a well-balanced diet, exercise regularly, limit alcohol use, and quit all forms of tobacco/nicotine, if applicable. Regular preventive services are another way to keep healthy. Preventive services (vaccines, screening tests, monitoring & exams) can help personalize your care plan, which helps you manage your own care. Screening tests can find health problems at the earliest stages, when they are easiest to treat. Arnaldo Calderón follows the current, evidence-based guidelines published by the [facility] ([facility]) when recommending preventive services for our patients. Because we follow these guidelines, sometimes recommendations change over time as research supports it. (For example, mammograms used to be recommended annually. Even though Medicare will still pay for an annual mammogram, the newer guidelines recommend a mammogram every two years for women of average risk.)  Of course, you and your doctor may decide to screen more often for some diseases, based on your risk and your health status. Preventive services for you include:  - Medicare offers their members a free annual wellness visit, which is time for you and your primary care provider to discuss and plan for your preventive service needs. Take advantage of this benefit every year!  -All adults over the age of 72 should receive the recommended pneumonia vaccines. Current USPSTF guidelines recommend a series of two vaccines for the best pneumonia protection.   -All adults should have a flu vaccine yearly and a tetanus vaccine every 10 years. All adults age 61 and older should receive a shingles vaccine once in their lifetime.    -A bone mass density test is recommended when a woman turns 65 to screen for osteoporosis. This test is only recommended one time, as a screening.  Some providers will use this same test as a disease monitoring tool if you already have osteoporosis. -All adults age 38-68 who are overweight should have a diabetes screening test once every three years.   -Other screening tests and preventive services for persons with diabetes include: an eye exam to screen for diabetic retinopathy, a kidney function test, a foot exam, and stricter control over your cholesterol.   -Cardiovascular screening for adults with routine risk involves an electrocardiogram (ECG) at intervals determined by your doctor.   -Colorectal cancer screenings should be done for adults age 54-65 with no increased risk factors for colorectal cancer. There are a number of acceptable methods of screening for this type of cancer. Each test has its own benefits and drawbacks. Discuss with your doctor what is most appropriate for you during your annual wellness visit. The different tests include: colonoscopy (considered the best screening method), a fecal occult blood test, a fecal DNA test, and sigmoidoscopy. -Breast cancer screenings are recommended every other year for women of normal risk, age 54-69.  -Cervical cancer screenings for women over age 72 are only recommended with certain risk factors.   -All adults born between [facility] should be screened once for Hepatitis C.      Here is a list of your current Health Maintenance items (your personalized list of preventive services) with a due date:  Health Maintenance Due   Topic Date Due    Shingles Vaccine (1 of 2) 04/04/1989    Flu Vaccine  08/01/2019    Annual Well Visit  08/18/2019

## 2019-08-19 NOTE — PROGRESS NOTES
Chief Complaint   Patient presents with   [place] Annual Wellness Visit         Follow Up Chronic Condition       Pt is a [de-identified]y.o. year old female who presents for follow up of her chronic medical problems    Repeat BP-better    Wt Readings from Last 3 Encounters:   08/19/19 81 lb (36.7 kg)   06/17/19 83 lb 6.4 oz (37.8 kg)   03/01/19 86 lb (39 kg)   Taking Boost now-more protein than Ensure    Prolia improved her bone density-we scheduled her next infusion today  Still taking Citracal D      Needs refill Mirtazapine 30 mg-son wants her to try a higher dose; has been on it for a while/over 27 yrs and he feels she is getting more depressed especially with the bronchiectasis    Has an appt with new pulmo for the bronchiectasis (Dr Taylor Biswas left)  Still coughing but not as much as in the spring  Saw ID recently-stays on antibiotics for the rest of her life-CT showed worsening; Dr Ferreira Im thinks it is because of reflux that she is aspirating bacteria into her lungs    Reflux-aspirating bacteria that is getting into her lungs  Tried Prilosec and Prevacid with Dr Keely Sanches which causes her stomach pain  Zantac does not seem to bother her but not as effective  Had severe reflux of swallowing eval and Ba swallow      ROS:    Pt denies: Wt loss, Fever/Chills, HA, Visual changes, Fatigue, Chest pain, SOB, KUMAR, Abd pain, N/V/D/C, Blood in stool or urine, Edema. Pertinent positive as above in HPI.  All others were negative    Patient Active Problem List   Diagnosis Code    Hyperlipidemia E78.5    Essential hypertension, benign I10    Vitamin D deficiency E55.9    Advance directive discussed with patient Z70.80    Moderate single current episode of major depressive disorder ([address] F32.1    Age-related osteoporosis without current pathological fracture M81.0    Patient underweight R63.6    Bronchiectasis with acute exacerbation ([address] J47.1    MYRIAM (mycobacterium avium-intracellulare) infection ([address] A31.0    Gastroesophageal reflux disease K21.9       Past Medical History:   Diagnosis Date    Hypercholesterolemia     high in past / was on meds at that time but now off    MYRIAM (mycobacterium avium-intracellulare) infection ([facility] [address]     treated and released by Pulmonary       Current Outpatient Medications   Medication Sig Dispense Refill    raNITIdine (ZANTAC) 150 mg tablet Take 150 mg by mouth two (2) times a day.  mirtazapine (REMERON) 45 mg tablet Take 1 Tab by mouth nightly. 90 Tab 1    dexlansoprazole (DEXILANT) 30 mg capsule Take 1 Cap by mouth daily. 30 Cap 3    erythromycin 500 mg tablet Take  by mouth once. Monday, Wednesday, Friday      amLODIPine (NORVASC) 5 mg tablet Take 1 Tab by mouth daily. 90 Tab 3    simvastatin (ZOCOR) 20 mg tablet TAKE 1 TABLET BY MOUTH EACH NIGHT 90 Tab 2    benzonatate (TESSALON) 100 mg capsule Take 1 Cap by mouth three (3) times daily as needed for Cough. 30 Cap 0    denosumab (PROLIA) 60 mg/mL injection Once every 6 months 1 mL 2    ethambutol (MYAMBUTOL) 400 mg tablet Take 800 mg by mouth every Monday, Wednesday, Friday.  rifAMPin (RIFADIN) 300 mg capsule Take 300 mg by mouth every Monday, Wednesday, Friday.  CALCIUM PHOSPHATE TRIB/VIT D3 (CITRACAL + D PO) Take  by mouth.  flaxseed oil 1,000 mg Cap Take  by mouth.  red yeast rice extract 600 mg Cap Take 600 mg by mouth now.  LYCOPENE PO Take  by mouth. Social History     Tobacco Use   Smoking Status Never Smoker   Smokeless Tobacco Never Used       No Known Allergies    Patient Labs were reviewed: yes      Patient Past Records were reviewed:  yes        Objective:     Vitals:    08/19/19 1116   BP: 138/73   Pulse: 80   Resp: 18   Temp: 98.2 °F (36.8 °C)   TempSrc: Oral   SpO2: 93%   Weight: 81 lb (36.7 kg)   Height: 5' 1\" (1.549 m)     Body mass index is 15.3 kg/m².     Exam:   Appearance: alert, well appearing,  oriented to person, place, and time, acyanotic, in no respiratory distress and well hydrated. HEENT:  NC/AT, pink conj, anicteric sclerae  Neck:  No cervical lymphadenopathy, no JVD, no thyromegaly, no carotid bruit  Heart:  RRR without M/R/G  Lungs:  CTAB, no rhonchi, rales, or wheezes with good air exchange   Abdomen:  Non-tender, pos bowel sounds, no hepatosplenomegaly  Ext:  No C/C/E    Skin: no rash  Neuro: no lateralizing signs, CNs II-XII intact      Assessment/ Plan:   Diagnoses and all orders for this visit:    1. Medicare annual wellness visit, subsequent-see note below    2. Age-related osteoporosis without current pathological fracture-continue with Prolia Q 6 months    3. Moderate single current episode of major depressive disorder (HCC)-symptomatic, will increase dose  -     mirtazapine (REMERON) 45 mg tablet; Take 1 Tab by mouth nightly. 4. Gastroesophageal reflux disease, esophagitis presence not specified-will try Dexilant as the other PPIs gave her stomach upset  -     dexlansoprazole (DEXILANT) 30 mg capsule; Take 1 Cap by mouth daily. 5. Patient underweight-son will continue with efforts to find ways to give her more calories    6. MYRIAM (mycobacterium avium-intracellulare) infection ([facility])-antibiotics per ID    7. Bronchiectasis without complication ([facility])-keep appt with Pulmo      Follow-up and Dispositions    · Return in about 3 months (around 11/19/2019) for follow up. I have discussed the diagnosis with the patient and the intended plan as seen in the above orders. The patient has received an After-Visit Summary and questions were answered concerning future plans. Medication Side Effects and Warnings were discussed with patient: yes    Patient verbalized understanding of above instructions.     Kelly Rodriguez MD  Internal Medicine  [facility]

## 2019-08-19 NOTE — ACP (ADVANCE CARE PLANNING)
Advance Care Planning (ACP) Provider Conversation Snapshot    Date of ACP Conversation: 08/19/19  Persons included in Conversation:  patient and family  Length of ACP Conversation in minutes:  <16 minutes (Non-Billable)    Authorized Decision Maker (if patient is incapable of making informed decisions):    This person is:   her son Sabine Minor            For Patients with Decision Making Capacity:   Values/Goals: Exploration of values, goals, and preferences if recovery is not expected, even with continued medical treatment in the event of:  Imminent death  Severe, permanent brain injury    Conversation Outcomes / Follow-Up Plan:   Recommended completion of Advance Directive form after review of ACP materials and conversation with prospective healthcare agent     Has one at home and will bring a copy here next time

## 2019-08-19 NOTE — PROGRESS NOTES
This is the Subsequent Medicare Annual Wellness Exam, performed 12 months or more after the Initial AWV or the last Subsequent AWV    I have reviewed the patient's medical history in detail and updated the computerized patient record. Here with her son: reports of short term memory loss but he is aware that the meds do not really reverse this and this is the least of his worries about her  She continues to live by herself but he lives nearby    Immunizations reviewed:flu shot in Oct; get Shingrix at her pharmacy as we do not carry it here    Lab Results   Component Value Date/Time    Cholesterol, total 187 06/11/2018 02:00 PM    HDL Cholesterol 91 (H) 06/11/2018 02:00 PM    LDL, calculated 84 06/11/2018 02:00 PM    VLDL, calculated 12 06/11/2018 02:00 PM    Triglyceride 60 06/11/2018 02:00 PM    CHOL/HDL Ratio 2.1 06/11/2018 02:00 PM   not fasting today    Wt Readings from Last 3 Encounters:   08/19/19 81 lb (36.7 kg)   06/17/19 83 lb 6.4 oz (37.8 kg)   03/01/19 86 lb (39 kg)           History     Past Medical History:   Diagnosis Date    Hypercholesterolemia     high in past / was on meds at that time but now off    MYRIAM (mycobacterium avium-intracellulare) infection ([facility] [address]     treated and released by Pulmonary      History reviewed. No pertinent surgical history. Current Outpatient Medications   Medication Sig Dispense Refill    raNITIdine (ZANTAC) 150 mg tablet Take 150 mg by mouth two (2) times a day.  erythromycin 500 mg tablet Take  by mouth once. Monday, Wednesday, Friday      amLODIPine (NORVASC) 5 mg tablet Take 1 Tab by mouth daily. 90 Tab 3    simvastatin (ZOCOR) 20 mg tablet TAKE 1 TABLET BY MOUTH EACH NIGHT 90 Tab 2    mirtazapine (REMERON) 30 mg tablet Take 1 Tab by mouth nightly. 90 Tab 1    benzonatate (TESSALON) 100 mg capsule Take 1 Cap by mouth three (3) times daily as needed for Cough.  30 Cap 0    denosumab (PROLIA) 60 mg/mL injection Once every 6 months 1 mL 2    ethambutol (MYAMBUTOL) 400 mg tablet Take 800 mg by mouth every Monday, Wednesday, Friday.  rifAMPin (RIFADIN) 300 mg capsule Take 300 mg by mouth every Monday, Wednesday, Friday.  CALCIUM PHOSPHATE TRIB/VIT D3 (CITRACAL + D PO) Take  by mouth.  flaxseed oil 1,000 mg Cap Take  by mouth.  red yeast rice extract 600 mg Cap Take 600 mg by mouth now.  omeprazole (PRILOSEC) 40 mg capsule Take 1 Cap by mouth daily. (Patient taking differently: Take 40 mg by mouth two (2) times a day.) 90 Cap 1    LYCOPENE PO Take  by mouth. No Known Allergies  History reviewed. No pertinent family history. Social History     Tobacco Use    Smoking status: Never Smoker    Smokeless tobacco: Never Used   Substance Use Topics    Alcohol use: No     Patient Active Problem List   Diagnosis Code    Hyperlipidemia E78.5    Essential hypertension, benign I10    Vitamin D deficiency E55.9    Advance directive discussed with patient Z71.89    Moderate single current episode of major depressive disorder ([facility] F32.1    Age-related osteoporosis without current pathological fracture M81.0    Patient underweight R63.6    Bronchiectasis with acute exacerbation ([facility] [facility] J47.1    MYRIAM (mycobacterium avium-intracellulare) infection ([facility] [address] A31.0    Gastroesophageal reflux disease K21.9       Depression Risk Factor Screening:   No flowsheet data found. Alcohol Risk Factor Screening: You do not drink alcohol or very rarely. Functional Ability and Level of Safety:   Hearing Loss  Hearing is good. Activities of Daily Living  The home contains: handrails and grab bars  Patient does total self care  Still lives by herself    Fall Risk  Fall Risk Assessment, last 12 mths 8/19/2019   Able to walk? Yes   Fall in past 12 months?  No       Abuse Screen  Patient is not abused    Cognitive Screening   Evaluation of Cognitive Function:  Has your family/caregiver stated any concerns about your memory: yes  Normal, Clock Drawing test    Patient Care Team   Patient Care Team:  Kate Dandy, MD as PCP - General (Internal Medicine)  Missy Childers MD (Internal Medicine)  Rekha Juárez MD (Ophthalmology)  Jasiel Chin MD (Gastroenterology)  Elise Cha MD (Unknown Physician Specialty)    Assessment/Plan   Education and counseling provided:  Are appropriate based on today's review and evaluation  End-of-Life planning (with patient's consent)-see ACP note  Pneumococcal Vaccine-done  Influenza Vaccine-in Oct  Screening Mammography-up to date  Cardiovascular screening blood test-fasting lipids next visit  Bone mass measurement (DEXA)-up to date  Screening for glaucoma-eye exam is up to date/wears eyeglasses  Diabetes screening test-FBS current    Diagnoses and all orders for this visit:    1. Medicare annual wellness visit, subsequent-Refer to above for plan and to patient instructions for recommendations on HM    2.  Advanced directives discussed with patient and son    Health Maintenance Due   Topic Date Due    Shingrix Vaccine Age 49> (1 of 2) 04/04/1989    Influenza Age 5 to Adult  08/01/2019    MEDICARE YEARLY EXAM  08/18/2019     RTC yearly for wellness visit

## 2019-08-21 NOTE — TELEPHONE ENCOUNTER
Prolia order, labs, last bone density results, and office note have been faxed to [phone] . They will contact patient to get her scheduled.

## 2019-08-26 ENCOUNTER — TELEPHONE (OUTPATIENT)
Dept: FAMILY MEDICINE CLINIC | Age: 79
End: 2019-08-26

## 2019-08-26 NOTE — TELEPHONE ENCOUNTER
I spoke with the [facility], explained that everything was sent to them. She stated they received only the 1st of 10 pages, unsure why, I received confirmation though when I sent them on 8/21/19. They will call the patient's son now to get her scheduled and I'm re-faxing the info.

## 2019-08-26 NOTE — TELEPHONE ENCOUNTER
Calling because pt is waiting for shots order to be sent to [facility].   Also requesting for lab results from 8/2 to be sent to [facility]

## 2019-08-26 NOTE — PROGRESS NOTES
Results and order for Prolia sent to [facility],  I spoke with them after I faxed all necessary info and they will contact pt to schedule.

## 2019-11-21 ENCOUNTER — OFFICE VISIT (OUTPATIENT)
Dept: FAMILY MEDICINE CLINIC | Age: 79
End: 2019-11-21

## 2019-11-21 VITALS
OXYGEN SATURATION: 95 % | HEART RATE: 90 BPM | RESPIRATION RATE: 16 BRPM | DIASTOLIC BLOOD PRESSURE: 73 MMHG | TEMPERATURE: 97.8 F | SYSTOLIC BLOOD PRESSURE: 145 MMHG | BODY MASS INDEX: 16.24 KG/M2 | WEIGHT: 86 LBS | HEIGHT: 61 IN

## 2019-11-21 DIAGNOSIS — I10 ESSENTIAL HYPERTENSION, BENIGN: Primary | ICD-10-CM

## 2019-11-21 DIAGNOSIS — L30.9 ECZEMA, UNSPECIFIED TYPE: ICD-10-CM

## 2019-11-21 DIAGNOSIS — M81.0 AGE-RELATED OSTEOPOROSIS WITHOUT CURRENT PATHOLOGICAL FRACTURE: ICD-10-CM

## 2019-11-21 DIAGNOSIS — J47.9 BRONCHIECTASIS WITHOUT COMPLICATION (HCC): ICD-10-CM

## 2019-11-21 DIAGNOSIS — A31.0 MAI (MYCOBACTERIUM AVIUM-INTRACELLULARE) INFECTION (HCC): ICD-10-CM

## 2019-11-21 DIAGNOSIS — Z23 ENCOUNTER FOR IMMUNIZATION: ICD-10-CM

## 2019-11-21 RX ORDER — GUAIFENESIN 600 MG/1
600 TABLET, EXTENDED RELEASE ORAL
COMMUNITY
Start: 2019-10-01 | End: 2020-02-24 | Stop reason: ALTCHOICE

## 2019-11-21 RX ORDER — BETAMETHASONE DIPROPIONATE 0.5 MG/G
CREAM TOPICAL 2 TIMES DAILY
Qty: 15 G | Refills: 1 | Status: SHIPPED | OUTPATIENT
Start: 2019-11-21 | End: 2020-08-20

## 2019-11-21 NOTE — PROGRESS NOTES
Chief Complaint   Patient presents with    Follow Up Chronic Condition     3 month; patient not fasting       Pt is a [de-identified]y.o. year old female who presents for follow up of her chronic medical problems      Health Maintenance Due   Topic Date Due    Shingrix Vaccine Age 49> (1 of 2) 04/04/1989    Influenza Age 5 to Adult -today 08/01/2019       Pulmo-added Mucinex daily bec still coughing    Awaiting cultures from ID-if neg meds for 12 more months    Wt Readings from Last 3 Encounters:   11/21/19 86 lb (39 kg)   08/19/19 81 lb (36.7 kg)   06/17/19 83 lb 6.4 oz (37.8 kg)       BP Readings from Last 3 Encounters:   11/21/19 145/73   08/19/19 138/73   06/17/19 142/72   running high at home and at other doctors' office  sBP always high    Got her Prolia    Itchy rash on the base of the right scalp    ROS:    Pt denies: Wt loss, Fever/Chills, HA, Visual changes, Fatigue, Chest pain, SOB, KUMAR, Abd pain, N/V/D/C, Blood in stool or urine, Edema. Pertinent positive as above in HPI. All others were negative    Patient Active Problem List   Diagnosis Code    Hyperlipidemia E78.5    Essential hypertension, benign I10    Vitamin D deficiency E55.9    Advance directive discussed with patient Z70.80    Moderate single current episode of major depressive disorder ([address] F32.1    Age-related osteoporosis without current pathological fracture M81.0    Patient underweight R63.6    Bronchiectasis with acute exacerbation ([address] J47.1    MYRIAM (mycobacterium avium-intracellulare) infection ([address] A31.0    Gastroesophageal reflux disease K21.9       Past Medical History:   Diagnosis Date    Hypercholesterolemia     high in past / was on meds at that time but now off    MYRIAM (mycobacterium avium-intracellulare) infection ([address]     treated and released by Pulmonary       Current Outpatient Medications   Medication Sig Dispense Refill    guaiFENesin ER (MUCINEX) 600 mg ER tablet Take 600 mg by mouth.       betamethasone dipropionate (DIPROSONE) 0.05 % topical cream Apply  to affected area two (2) times a day. 15 g 1    raNITIdine (ZANTAC) 150 mg tablet Take 150 mg by mouth two (2) times a day.  mirtazapine (REMERON) 45 mg tablet Take 1 Tab by mouth nightly. 90 Tab 1    dexlansoprazole (DEXILANT) 30 mg capsule Take 1 Cap by mouth daily. 30 Cap 3    erythromycin 500 mg tablet Take  by mouth once. Monday, Wednesday, Friday      amLODIPine (NORVASC) 5 mg tablet Take 1 Tab by mouth daily. 90 Tab 3    simvastatin (ZOCOR) 20 mg tablet TAKE 1 TABLET BY MOUTH EACH NIGHT 90 Tab 2    denosumab (PROLIA) 60 mg/mL injection Once every 6 months 1 mL 2    ethambutol (MYAMBUTOL) 400 mg tablet Take 800 mg by mouth every Monday, Wednesday, Friday.  rifAMPin (RIFADIN) 300 mg capsule Take 300 mg by mouth every Monday, Wednesday, Friday.  CALCIUM PHOSPHATE TRIB/VIT D3 (CITRACAL + D PO) Take  by mouth.  flaxseed oil 1,000 mg Cap Take  by mouth.  LYCOPENE PO Take  by mouth.  red yeast rice extract 600 mg Cap Take 600 mg by mouth now.  benzonatate (TESSALON) 100 mg capsule Take 1 Cap by mouth three (3) times daily as needed for Cough. 27 Cap 0       Social History     Tobacco Use   Smoking Status Never Smoker   Smokeless Tobacco Never Used       No Known Allergies    Patient Labs were reviewed: yes      Patient Past Records were reviewed:  yes        Objective:     Vitals:    11/21/19 1414   BP: 145/73   Pulse: 90   Resp: 16   Temp: 97.8 °F (36.6 °C)   TempSrc: Oral   SpO2: 95%   Weight: 86 lb (39 kg)   Height: 5' 1\" (1.549 m)     Body mass index is 16.25 kg/m². Exam:   Appearance: alert, well appearing,  oriented to person, place, and time, acyanotic, in no respiratory distress and well hydrated.   HEENT:  NC/AT, pink conj, anicteric sclerae  Neck:  No cervical lymphadenopathy, no JVD, no thyromegaly, no carotid bruit  Heart:  RRR without M/R/G  Lungs:  CTAB, no rhonchi, rales, or wheezes with good air exchange   Abdomen: Non-tender, pos bowel sounds, no hepatosplenomegaly  Ext:  No C/C/E    Skin: scaly rash at the base of the scalp on the right; no rash elsewhere  Neuro: no lateralizing signs, CNs II-XII intact      Assessment/ Plan:   Diagnoses and all orders for this visit:     1. Essential hypertension, benign-uncontrolled, inc Norvasc to 10 mg (2 of the 5 mg pills) and watch out for edema    2. Age-related osteoporosis without current pathological fracture-continue with Prolia    3. Bronchiectasis without complication (HCC)-ID and Pulmo following; advised to let me know if she would like a nebulizer    4. MYRIAM (mycobacterium avium-intracellulare) infection (HCC)-ID following, continue with tx    5. Eczema, unspecified type  -     betamethasone dipropionate (DIPROSONE) 0.05 % topical cream; Apply  to affected area two (2) times a day. 6. Encounter for immunization  -     INFLUENZA VIRUS VAC QUAD,SPLIT,PRESV FREE SYRINGE IM  -     ADMIN INFLUENZA VIRUS VAC        Follow-up and Dispositions    · Return in about 3 months (around 2/21/2020) for follow up. I have discussed the diagnosis with the patient and the intended plan as seen in the above orders. The patient has received an After-Visit Summary and questions were answered concerning future plans. Medication Side Effects and Warnings were discussed with patient: yes    Patient verbalized understanding of above instructions.     Doris Marley MD  Internal Medicine  [facility]

## 2019-11-21 NOTE — PROGRESS NOTES
Chief Complaint   Patient presents with    Follow Up Chronic Condition     3 month; patient not fasting     1. Have you been to the ER, urgent care clinic since your last visit? Hospitalized since your last visit? No    2. Have you seen or consulted any other health care providers outside of the [address] [place] [address] since your last visit? Include any pap smears or colon screening.  Dr. Laurel Iyer       9/5/19 Christel Shot

## 2019-11-21 NOTE — PROGRESS NOTES
Angie Steiner is a [de-identified] y.o. female who presents for routine immunizations. She denies any symptoms , reactions or allergies that would exclude them from being immunized today. Risks and adverse reactions were discussed and the VIS was given to them. All questions were addressed. She was observed for 15 min post injection. There were no reactions observed.     Deb Fay LPN

## 2019-11-21 NOTE — PATIENT INSTRUCTIONS
Avoiding Triggers With Heart Failure: Care Instructions Your Care Instructions Triggers are anything that make your heart failure flare up. A flare-up is also called \"sudden heart failure\" or \"acute heart failure. \" When you have a flare-up, fluid builds up in your lungs, and you have problems breathing. You might need to go to the hospital. By watching for changes in your condition and avoiding triggers, you can prevent heart failure flare-ups. Follow-up care is a key part of your treatment and safety. Be sure to make and go to all appointments, and call your doctor if you are having problems. It's also a good idea to know your test results and keep a list of the medicines you take. How can you care for yourself at home? Watch for changes in your weight and condition · Weigh yourself without clothing at the same time each day. Record your weight. Call your doctor if you have sudden weight gain, such as more than 2 to 3 pounds in a day or 5 pounds in a week. (Your doctor may suggest a different range of weight gain.) A sudden weight gain may mean that your heart failure is getting worse. · Keep a daily record of your symptoms. Write down any changes in how you feel, such as new shortness of breath, cough, or problems eating. Also record if your ankles are more swollen than usual and if you feel more tired than usual. Note anything that you ate or did that could have triggered these changes. Limit sodium Sodium causes your body to hold on to extra water. This may cause your heart failure symptoms to get worse. People get most of their sodium from processed foods. Fast food and restaurant meals also tend to be very high in sodium. · Your doctor may suggest that you limit sodium to 2,000 milligrams (mg) a day or less. That is less than 1 teaspoon of salt a day, including all the salt you eat in cooking or in packaged foods. · Read food labels on cans and food packages.  They tell you how much sodium you get in one serving. Check the serving size. If you eat more than one serving, you are getting more sodium. · Be aware that sodium can come in forms other than salt, including monosodium glutamate (MSG), sodium citrate, and sodium bicarbonate (baking soda). MSG is often added to Asian food. You can sometimes ask for food without MSG or salt. · Slowly reducing salt will help you adjust to the taste. Take the salt shaker off the table. · Flavor your food with garlic, lemon juice, onion, vinegar, herbs, and spices instead of salt. Do not use soy sauce, steak sauce, onion salt, garlic salt, mustard, or ketchup on your food, unless it is labeled \"low-sodium\" or \"low-salt. \" 
· Make your own salad dressings, sauces, and ketchup without adding salt. · Use fresh or frozen ingredients, instead of canned ones, whenever you can. Choose low-sodium canned goods. · Eat less processed food and food from restaurants, including fast food. Exercise as directed Moderate, regular exercise is very good for your heart. It improves your blood flow and helps control your weight. But too much exercise can stress your heart and cause a heart failure flare-up. · Check with your doctor before you start an exercise program. 
· Walking is an easy way to get exercise. Start out slowly. Gradually increase the length and pace of your walk. Swimming, riding a bike, and using a treadmill are also good forms of exercise. · When you exercise, watch for signs that your heart is working too hard. You are pushing yourself too hard if you cannot talk while you are exercising. If you become short of breath or dizzy or have chest pain, stop, sit down, and rest. 
· Do not exercise when you do not feel well. Take medicines correctly · Take your medicines exactly as prescribed. Call your doctor if you think you are having a problem with your medicine. · Make a list of all the medicines you take.  Include those prescribed to you by other doctors and any over-the-counter medicines, vitamins, or supplements you take. Take this list with you when you go to any doctor. · Take your medicines at the same time every day. It may help you to post a list of all the medicines you take every day and what time of day you take them. · Make taking your medicine as simple as you can. Plan times to take your medicines when you are doing other things, such as eating a meal or getting ready for bed. This will make it easier to remember to take your medicines. · Get organized. Use helpful tools, such as daily or weekly pill containers. When should you call for help? Call 911 if you have symptoms of sudden heart failure such as: 
  · You have severe trouble breathing.  
  · You cough up pink, foamy mucus.  
  · You have a new irregular or rapid heartbeat.  
 Call your doctor now or seek immediate medical care if: 
  · You have new or increased shortness of breath.  
  · You are dizzy or lightheaded, or you feel like you may faint.  
  · You have sudden weight gain, such as more than 2 to 3 pounds in a day or 5 pounds in a week. (Your doctor may suggest a different range of weight gain.)  
  · You have increased swelling in your legs, ankles, or feet.  
  · You are suddenly so tired or weak that you cannot do your usual activities.  
 Watch closely for changes in your health, and be sure to contact your doctor if you develop new symptoms. Where can you learn more? Go to http://mandy-rossy.info/. Enter I175 in the search box to learn more about \"Avoiding Triggers With Heart Failure: Care Instructions. \" Current as of: April 9, 2019 Content Version: 12.2 © 8533-6399 [employer]. Care instructions adapted under license by [employer] (which disclaims liability or warranty for this information).  If you have questions about a medical condition or this instruction, always ask your healthcare professional. [place] [address] any warranty or liability for your use of this information. Bronchiectasis: Care Instructions Your Care Instructions Bronchiectasis (say \"mdkum-srm-NXC-tuh-juan\") is a lung problem in which the breathing tubes are stretched and become larger. The buildup of mucus causes the stretching and can lead to swelling and infections. You may find it harder to breathe and cough up mucus out of your lungs. Some people are born with it. Other people get it because of another health problem, usually cystic fibrosis or a lung infection such as pneumonia or tuberculosis. Symptoms are often different for everyone. But a cough that brings up mucus, or sputum, is common. The condition is usually treated with antibiotics, medicines to relax the airways (bronchodilators), and medicines to make it easier to cough up mucus (expectorants). Follow-up care is a key part of your treatment and safety. Be sure to make and go to all appointments, and call your doctor if you are having problems. It's also a good idea to know your test results and keep a list of the medicines you take. How can you care for yourself at home? · Take your antibiotics as directed. Do not stop taking them just because you feel better. You need to take the full course of antibiotics. · Take your medicines exactly as prescribed. Call your doctor if you think you are having a problem with your medicine. · If you have cystic fibrosis, follow your treatment plan. · If you or your child has bronchiectasis, follow directions from your doctor or respiratory therapist for moving your or your child's body into different positions to help drain fluid. This is called postural drainage, and it helps to ease breathing and prevent infections. · You also may do chest percussion on your child.  This is strong clapping of the chest with a cupped hand to vibrate the airways in the lungs. The vibration helps your child cough up mucus. You may see a respiratory therapist to learn how to do chest percussion. · Use an airway clearance device, such as a flutter valve, as directed to help remove mucus from the lungs. · Avoid lung infections. ? Get shots to protect against the flu and pneumococcal disease. ? Wash your hands frequently. ? Avoid close contact with people who have colds or the flu. ? Do not smoke or allow others to smoke around you. If you need help quitting, talk to your doctor about stop-smoking programs and medicines. These can increase your chances of quitting for good. ? Stay inside, if you can, on days when the pollution level is high. When should you call for help? Call 911 anytime you think you may need emergency care. For example, call if: 
  · You have severe trouble breathing.  
  · You cough up more than 1 cup of blood.  
 Call your doctor now or seek immediate medical care if: 
  · You have chest pain.  
  · You have shortness of breath.  
  · You have a fever.  
  · Your mucus (sputum) is bloody or changes color.  
 Watch closely for changes in your health, and be sure to contact your doctor if: 
  · You are coughing up more sputum than before.  
  · Your symptoms get worse or do not get better with treatment. Where can you learn more? Go to http://mandy-rossy.info/. Enter C667 in the search box to learn more about \"Bronchiectasis: Care Instructions. \" Current as of: June 9, 2019 Content Version: 12.2 © 7671-7402 [employer]. Care instructions adapted under license by [employer] (which disclaims liability or warranty for this information). If you have questions about a medical condition or this instruction, always ask your healthcare professional. [address] any warranty or liability for your use of this information.

## 2020-02-24 ENCOUNTER — OFFICE VISIT (OUTPATIENT)
Dept: FAMILY MEDICINE CLINIC | Age: 80
End: 2020-02-24

## 2020-02-24 ENCOUNTER — HOSPITAL ENCOUNTER (OUTPATIENT)
Dept: LAB | Age: 80
Discharge: HOME | End: 2020-02-24
Payer: MEDICARE

## 2020-02-24 VITALS
BODY MASS INDEX: 15.93 KG/M2 | OXYGEN SATURATION: 93 % | RESPIRATION RATE: 17 BRPM | DIASTOLIC BLOOD PRESSURE: 58 MMHG | SYSTOLIC BLOOD PRESSURE: 121 MMHG | HEART RATE: 79 BPM | HEIGHT: 61 IN | WEIGHT: 84.4 LBS | TEMPERATURE: 97.9 F

## 2020-02-24 DIAGNOSIS — E78.5 HYPERLIPIDEMIA, UNSPECIFIED HYPERLIPIDEMIA TYPE: ICD-10-CM

## 2020-02-24 DIAGNOSIS — I10 ESSENTIAL HYPERTENSION, BENIGN: Primary | ICD-10-CM

## 2020-02-24 DIAGNOSIS — K21.9 GASTROESOPHAGEAL REFLUX DISEASE, ESOPHAGITIS PRESENCE NOT SPECIFIED: ICD-10-CM

## 2020-02-24 DIAGNOSIS — F32.1 MODERATE SINGLE CURRENT EPISODE OF MAJOR DEPRESSIVE DISORDER (HCC): ICD-10-CM

## 2020-02-24 DIAGNOSIS — M81.0 AGE-RELATED OSTEOPOROSIS WITHOUT CURRENT PATHOLOGICAL FRACTURE: ICD-10-CM

## 2020-02-24 DIAGNOSIS — I10 ESSENTIAL HYPERTENSION, BENIGN: ICD-10-CM

## 2020-02-24 DIAGNOSIS — R63.6 PATIENT UNDERWEIGHT: ICD-10-CM

## 2020-02-24 DIAGNOSIS — J47.9 BRONCHIECTASIS WITHOUT COMPLICATION (HCC): ICD-10-CM

## 2020-02-24 DIAGNOSIS — A31.0 MAI (MYCOBACTERIUM AVIUM-INTRACELLULARE) INFECTION (HCC): ICD-10-CM

## 2020-02-24 LAB
ALBUMIN SERPL-MCNC: 3.8 G/DL (ref 3.4–5)
ALBUMIN/GLOB SERPL: 1.1 {RATIO} (ref 0.8–1.7)
ALP SERPL-CCNC: 39 U/L (ref 45–117)
ALT SERPL-CCNC: 23 U/L (ref 13–56)
ANION GAP SERPL CALC-SCNC: 4 MMOL/L (ref 3–18)
AST SERPL-CCNC: 22 U/L (ref 10–38)
BASOPHILS # BLD: 0 K/UL (ref 0–0.1)
BASOPHILS NFR BLD: 0 % (ref 0–2)
BILIRUB SERPL-MCNC: 0.6 MG/DL (ref 0.2–1)
BUN SERPL-MCNC: 11 MG/DL (ref 7–18)
BUN/CREAT SERPL: 18 (ref 12–20)
CALCIUM SERPL-MCNC: 8.9 MG/DL (ref 8.5–10.1)
CHLORIDE SERPL-SCNC: 101 MMOL/L (ref 100–111)
CHOLEST SERPL-MCNC: 203 MG/DL
CO2 SERPL-SCNC: 30 MMOL/L (ref 21–32)
CREAT SERPL-MCNC: 0.6 MG/DL (ref 0.6–1.3)
DIFFERENTIAL METHOD BLD: ABNORMAL
EOSINOPHIL # BLD: 0.2 K/UL (ref 0–0.4)
EOSINOPHIL NFR BLD: 2 % (ref 0–5)
ERYTHROCYTE [DISTWIDTH] IN BLOOD BY AUTOMATED COUNT: 13 % (ref 11.6–14.5)
GLOBULIN SER CALC-MCNC: 3.6 G/DL (ref 2–4)
GLUCOSE SERPL-MCNC: 91 MG/DL (ref 74–99)
HCT VFR BLD AUTO: 38.9 % (ref 35–45)
HDLC SERPL-MCNC: 101 MG/DL (ref 40–60)
HDLC SERPL: 2 {RATIO} (ref 0–5)
HGB BLD-MCNC: 13.4 G/DL (ref 12–16)
LDLC SERPL CALC-MCNC: 92.6 MG/DL (ref 0–100)
LIPID PROFILE,FLP: ABNORMAL
LYMPHOCYTES # BLD: 1.6 K/UL (ref 0.9–3.6)
LYMPHOCYTES NFR BLD: 26 % (ref 21–52)
MCH RBC QN AUTO: 32.3 PG (ref 24–34)
MCHC RBC AUTO-ENTMCNC: 34.4 G/DL (ref 31–37)
MCV RBC AUTO: 93.7 FL (ref 74–97)
MONOCYTES # BLD: 0.4 K/UL (ref 0.05–1.2)
MONOCYTES NFR BLD: 6 % (ref 3–10)
NEUTS SEG # BLD: 4.1 K/UL (ref 1.8–8)
NEUTS SEG NFR BLD: 66 % (ref 40–73)
PLATELET # BLD AUTO: 212 K/UL (ref 135–420)
PMV BLD AUTO: 10 FL (ref 9.2–11.8)
POTASSIUM SERPL-SCNC: 4.3 MMOL/L (ref 3.5–5.5)
PROT SERPL-MCNC: 7.4 G/DL (ref 6.4–8.2)
RBC # BLD AUTO: 4.15 M/UL (ref 4.2–5.3)
SODIUM SERPL-SCNC: 135 MMOL/L (ref 136–145)
TRIGL SERPL-MCNC: 47 MG/DL (ref ?–150)
VLDLC SERPL CALC-MCNC: 9.4 MG/DL
WBC # BLD AUTO: 6.2 K/UL (ref 4.6–13.2)

## 2020-02-24 PROCEDURE — 36415 COLL VENOUS BLD VENIPUNCTURE: CPT

## 2020-02-24 PROCEDURE — 80061 LIPID PANEL: CPT

## 2020-02-24 PROCEDURE — 85025 COMPLETE CBC W/AUTO DIFF WBC: CPT

## 2020-02-24 PROCEDURE — 80053 COMPREHEN METABOLIC PANEL: CPT

## 2020-02-24 RX ORDER — SIMVASTATIN 20 MG/1
TABLET, FILM COATED ORAL
Qty: 90 TAB | Refills: 2 | Status: SHIPPED | OUTPATIENT
Start: 2020-02-24 | End: 2021-05-27

## 2020-02-24 RX ORDER — MIRTAZAPINE 45 MG/1
45 TABLET, FILM COATED ORAL
Qty: 90 TAB | Refills: 1 | Status: SHIPPED | OUTPATIENT
Start: 2020-02-24 | End: 2020-08-20 | Stop reason: SDUPTHER

## 2020-02-24 RX ORDER — AMLODIPINE BESYLATE 5 MG/1
5 TABLET ORAL DAILY
Qty: 90 TAB | Refills: 3 | Status: CANCELLED | OUTPATIENT
Start: 2020-02-24

## 2020-02-24 RX ORDER — AMLODIPINE BESYLATE 10 MG/1
10 TABLET ORAL DAILY
Qty: 90 TAB | Refills: 1 | Status: SHIPPED | OUTPATIENT
Start: 2020-02-24 | End: 2020-09-03

## 2020-02-24 RX ORDER — LANSOPRAZOLE 30 MG/1
CAPSULE, DELAYED RELEASE ORAL
COMMUNITY

## 2020-02-24 NOTE — PROGRESS NOTES
1. Have you been to the ER, urgent care clinic since your last visit? Hospitalized since your last visit? No    2. Have you seen or consulted any other health care providers outside of the [address] [place] [address] since your last visit? Include any pap smears or colon screening.  Yes Where: Infectious Disease-follow up

## 2020-02-24 NOTE — PROGRESS NOTES
Chief Complaint   Patient presents with    Follow Up Chronic Condition     3 month HTN, patient had coffee       Pt is a [de-identified]y.o. year old female who presents for follow up of her chronic medical problems    Health Maintenance Due   Topic Date Due    Shingrix Vaccine Age 49> (1 of 2) 04/04/1989    Lipid Screen -today 06/11/2019       Wt Readings from Last 3 Encounters:   02/24/20 84 lb 6.4 oz (38.3 kg)   11/21/19 86 lb (39 kg)   08/19/19 81 lb (36.7 kg)       BP Readings from Last 3 Encounters:   02/24/20 121/58   11/21/19 145/73   08/19/19 138/73   on 10 mg Norvasc from 5 mg    Lab Results   Component Value Date/Time    Cholesterol, total 187 06/11/2018 02:00 PM    HDL Cholesterol 91 (H) 06/11/2018 02:00 PM    LDL, calculated 84 06/11/2018 02:00 PM    VLDL, calculated 12 06/11/2018 02:00 PM    Triglyceride 60 06/11/2018 02:00 PM    CHOL/HDL Ratio 2.1 06/11/2018 02:00 PM   fasting except for coffee and waffle  On Zocor-needs refill      MYRIAM tx-odd meds now  Progress Notes  - documented in this encounter  Johnette Severe, April M, NP - 02/06/2020 9:00 AM EST  Formatting of this note might be different from the original.  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 [address]  Plan:   The patient desires to stop treatment as her symptoms have improved. I think this is reasonable based on her last sputum AFB cx on 10. 2.2019 that was negative. She has reflux disease that is being managed by her PCP with Zantac and could be the source of her persistent cough. I recommended that she be seen in our office if her symptoms return or worsen as she may need to provide another sputum sample to determine if resumption of therapy is necessary. She and her son agreed to the plan. She is released from our care.      Progress Notes  - documented in this encounter  Gray Mark MD - 10/01/2019 3:20 PM EDT  Formatting of this note might be different from the original.  PULMONARY CRITICAL CARE OFFICE PROGRESS NOTE  10/1/2019   REASON FOR FOLLOW-UP/CHIEF COMPLAINT: BRONCHIECTASIS (SP02-95% on RA)  REFERRING PHYSICIAN: Amarilys Hull  PRIMARY CARE PHYSICIAN: Chris Carcamo MD  ASSESSMENT/PLAN:   1. Bronchiectasis without complication ([facility] [address]  History of bronchiectasis with MYRIAM. Worsening cough over the past few months. She does have significant reflux without aspiration and a barium swallow last December. Currently being treated with antireflux medications. Cough likely related to bronchiectasis that is not under complete control. Her son notes that she does not always use her albuterol and only occasionally uses Acapella. Will reinforce the need for Acapella 10-15 times twice daily and Mucinex twice daily. May need to consider adding nebulized 3% saline to help out with mucus clearance as well.  - guaiFENesin (MUCINEX) 600 mg PO Ta12; Take 1 Tab by Mouth Every 12 hours. Dispense: 60 Tab; Refill: 11  2. 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 Likely that if treatment has been effective the should have decreased and her cough and productive sputum may be related to routine bronchiectasis loss of control versus infection.  - guaiFENesin (MUCINEX) 600 mg PO Ta12; Take 1 Tab by Mouth Every 12 hours. Dispense: 60 Tab; Refill: 11  - AFB CULTURE AND SMEAR; Future  - AFB CULTURE AND SMEAR; Future  - AFB CULTURE AND SMEAR; Future    3. Cough  Likely related to her bronchiectasis not aspiration. She does have a history of positive AFB with MAC and worsening control of this could be causing her cough although is more likely that she is not as adherent to a regimen of mucus clearance is necessary for bronchiectasis. Reinforced the need for her to do Acapella twice daily as well as Mucinex. Consideration of nebulized treatments in the future  Return in about 6 months (around 4/1/2020). Still coughing-random times  Clears phelgm and is better for a while  Taking Mucinex daily and Acapella device  Cold last week-took Nyquil which helped        GERD-on Prevacid 30 mg ( Dexilant not paid for by her insurance  Nexium gave her stomach pain  Off Zantac  Saw GI    Depression-on higher dose of Remeron-better per son    Libby Reading ws 12/2019  Last Dexa:  BONE DENSITY STUDY - CENTRAL7/5/2019  [facility]  Result Impression     1.  BMD MEASURES CONSISTENT WITH OSTEOPOROSIS. 2.  COMPARED TO THE PRECEDING STUDY, BMD HAS INCREASED. ROS:    Pt denies: Wt loss, Fever/Chills, HA, Visual changes, Fatigue, Chest pain, SOB, KUMAR, Abd pain, N/V/D/C, Blood in stool or urine, Edema. Pertinent positive as above in HPI. All others were negative    Patient Active Problem List   Diagnosis Code    Hyperlipidemia E78.5    Essential hypertension, benign I10    Vitamin D deficiency E55.9    Advance directive discussed with patient Z70.80    Moderate single current episode of major depressive disorder ([address] F32.1    Age-related osteoporosis without current pathological fracture M81.0    Patient underweight R63.6    Bronchiectasis with acute exacerbation ([address] J47.1    MYRIAM (mycobacterium avium-intracellulare) infection ([address] A31.0    Gastroesophageal reflux disease K21.9       Past Medical History:   Diagnosis Date    Hypercholesterolemia     high in past / was on meds at that time but now off    MYRIAM (mycobacterium avium-intracellulare) infection ([address]     treated and released by Pulmonary       Current Outpatient Medications   Medication Sig Dispense Refill    lansoprazole (PREVACID) 30 mg capsule Take  by mouth Daily (before breakfast).  mirtazapine (REMERON) 45 mg tablet Take 1 Tab by mouth nightly.  90 Tab 1  simvastatin (ZOCOR) 20 mg tablet TAKE 1 TABLET BY MOUTH EACH NIGHT 90 Tab 2    amLODIPine (NORVASC) 10 mg tablet Take 1 Tab by mouth daily. 90 Tab 1    betamethasone dipropionate (DIPROSONE) 0.05 % topical cream Apply  to affected area two (2) times a day. 15 g 1    dexlansoprazole (DEXILANT) 30 mg capsule Take 1 Cap by mouth daily. 30 Cap 3    denosumab (PROLIA) 60 mg/mL injection Once every 6 months 1 mL 2    CALCIUM PHOSPHATE TRIB/VIT D3 (CITRACAL + D PO) Take  by mouth.  flaxseed oil 1,000 mg Cap Take  by mouth.  LYCOPENE PO Take  by mouth.  red yeast rice extract 600 mg Cap Take 600 mg by mouth now. Social History     Tobacco Use   Smoking Status Never Smoker   Smokeless Tobacco Never Used       No Known Allergies    Patient Labs were reviewed: yes      Patient Past Records were reviewed:  yes        Objective:     Vitals:    02/24/20 0838   BP: 121/58   Pulse: 79   Resp: 17   Temp: 97.9 °F (36.6 °C)   TempSrc: Oral   SpO2: 93%   Weight: 84 lb 6.4 oz (38.3 kg)   Height: 5' 1\" (1.549 m)     Body mass index is 15.95 kg/m². Exam:   Appearance: alert, well appearing but frail,  oriented to person, place, and time, acyanotic, in no respiratory distress and well hydrated. HEENT:  NC/AT, pink conj, anicteric sclerae  Neck:  No cervical lymphadenopathy, no JVD, no thyromegaly, no carotid bruit  Heart:  RRR without M/R/G  Lungs:  CTAB, no rhonchi, rales, or wheezes with good air exchange   Abdomen:  Non-tender, pos bowel sounds, no hepatosplenomegaly  Ext:  No C/C/E    Skin: no rash  Neuro: no lateralizing signs, CNs II-XII intact      Assessment/ Plan:   Diagnoses and all orders for this visit:    1. Essential hypertension, benign-controlled on higher dose of Norvasc  -     CBC WITH AUTOMATED DIFF; Future  -     METABOLIC PANEL, COMPREHENSIVE; Future  -     amLODIPine (NORVASC) 10 mg tablet; Take 1 Tab by mouth daily.     2. Hyperlipidemia, unspecified hyperlipidemia type-at goal on Zocor  -     simvastatin (ZOCOR) 20 mg tablet; TAKE 1 TABLET BY MOUTH EACH NIGHT  -     LIPID PANEL; Future    3. Moderate single current episode of major depressive disorder (HCC)-better on higher dose of Remeron  -     mirtazapine (REMERON) 45 mg tablet; Take 1 Tab by mouth nightly. 4. Age-related osteoporosis without current pathological fracture-on Prolia; continue with daily Ca+d as well    5. MYRIAM (mycobacterium avium-intracellulare) infection ([facility])-s/p tx    6. Gastroesophageal reflux disease, esophagitis presence not specified-advised to inc Prevacid to BID to see if this will help the chronic cough    7. Bronchiectasis without complication ([facility])-ok to take prn OTC cough med like Nyquil at night; continue with Mucinex at daytime    8. Patient underweight-continue with Boost as dietary supplement        Follow-up and Dispositions    · Return in about 3 months (around 5/24/2020) for follow up. I have discussed the diagnosis with the patient and the intended plan as seen in the above orders. The patient has received an After-Visit Summary and questions were answered concerning future plans. Medication Side Effects and Warnings were discussed with patient: yes    Patient verbalized understanding of above instructions.     Jayne Lagos MD  Internal Medicine  [facility]

## 2020-02-24 NOTE — PATIENT INSTRUCTIONS
Bronchiectasis: Care Instructions Your Care Instructions Bronchiectasis (say \"khupq-qml-HXU-tuh-juan\") is a lung problem in which the breathing tubes are stretched and become larger. The buildup of mucus causes the stretching and can lead to swelling and infections. You may find it harder to breathe and cough up mucus out of your lungs. Some people are born with it. Other people get it because of another health problem, usually cystic fibrosis or a lung infection such as pneumonia or tuberculosis. Symptoms are often different for everyone. But a cough that brings up mucus, or sputum, is common. The condition is usually treated with antibiotics, medicines to relax the airways (bronchodilators), and medicines to make it easier to cough up mucus (expectorants). Follow-up care is a key part of your treatment and safety. Be sure to make and go to all appointments, and call your doctor if you are having problems. It's also a good idea to know your test results and keep a list of the medicines you take. How can you care for yourself at home? · Take your antibiotics as directed. Do not stop taking them just because you feel better. You need to take the full course of antibiotics. · Take your medicines exactly as prescribed. Call your doctor if you think you are having a problem with your medicine. · If you have cystic fibrosis, follow your treatment plan. · If you or your child has bronchiectasis, follow directions from your doctor or respiratory therapist for moving your or your child's body into different positions to help drain fluid. This is called postural drainage, and it helps to ease breathing and prevent infections. · You also may do chest percussion on your child. This is strong clapping of the chest with a cupped hand to vibrate the airways in the lungs. The vibration helps your child cough up mucus. You may see a respiratory therapist to learn how to do chest percussion. · Use an airway clearance device, such as a flutter valve, as directed to help remove mucus from the lungs. · Avoid lung infections. ? Get shots to protect against the flu and pneumococcal disease. ? Wash your hands frequently. ? Avoid close contact with people who have colds or the flu. ? Do not smoke or allow others to smoke around you. If you need help quitting, talk to your doctor about stop-smoking programs and medicines. These can increase your chances of quitting for good. ? Stay inside, if you can, on days when the pollution level is high. When should you call for help? Call 911 anytime you think you may need emergency care. For example, call if: 
  · You have severe trouble breathing.  
  · You cough up more than 1 cup of blood.  
 Call your doctor now or seek immediate medical care if: 
  · You have chest pain.  
  · You have shortness of breath.  
  · You have a fever.  
  · Your mucus (sputum) is bloody or changes color.  
 Watch closely for changes in your health, and be sure to contact your doctor if: 
  · You are coughing up more sputum than before.  
  · Your symptoms get worse or do not get better with treatment. Where can you learn more? Go to http://mandy-rossy.info/. Enter C667 in the search box to learn more about \"Bronchiectasis: Care Instructions. \" Current as of: June 9, 2019 Content Version: 12.2 © 5290-1011 [employer]. Care instructions adapted under license by [employer] (which disclaims liability or warranty for this information). If you have questions about a medical condition or this instruction, always ask your healthcare professional. [place] [address] any warranty or liability for your use of this information.

## 2020-08-20 ENCOUNTER — VIRTUAL VISIT (OUTPATIENT)
Dept: FAMILY MEDICINE CLINIC | Age: 80
End: 2020-08-20

## 2020-08-20 ENCOUNTER — OFFICE VISIT (OUTPATIENT)
Dept: FAMILY MEDICINE CLINIC | Age: 80
End: 2020-08-20

## 2020-08-20 VITALS
HEIGHT: 61 IN | OXYGEN SATURATION: 93 % | WEIGHT: 80.4 LBS | RESPIRATION RATE: 16 BRPM | TEMPERATURE: 97.6 F | DIASTOLIC BLOOD PRESSURE: 61 MMHG | BODY MASS INDEX: 15.18 KG/M2 | HEART RATE: 80 BPM | SYSTOLIC BLOOD PRESSURE: 129 MMHG

## 2020-08-20 DIAGNOSIS — Z71.89 ADVANCED DIRECTIVES, COUNSELING/DISCUSSION: ICD-10-CM

## 2020-08-20 DIAGNOSIS — R63.6 PATIENT UNDERWEIGHT: ICD-10-CM

## 2020-08-20 DIAGNOSIS — E78.5 HYPERLIPIDEMIA, UNSPECIFIED HYPERLIPIDEMIA TYPE: ICD-10-CM

## 2020-08-20 DIAGNOSIS — I10 ESSENTIAL HYPERTENSION, BENIGN: ICD-10-CM

## 2020-08-20 DIAGNOSIS — J47.9 BRONCHIECTASIS WITHOUT COMPLICATION (HCC): ICD-10-CM

## 2020-08-20 DIAGNOSIS — M81.0 AGE-RELATED OSTEOPOROSIS WITHOUT CURRENT PATHOLOGICAL FRACTURE: ICD-10-CM

## 2020-08-20 DIAGNOSIS — F32.1 MODERATE SINGLE CURRENT EPISODE OF MAJOR DEPRESSIVE DISORDER (HCC): ICD-10-CM

## 2020-08-20 DIAGNOSIS — Z00.00 MEDICARE ANNUAL WELLNESS VISIT, SUBSEQUENT: Primary | ICD-10-CM

## 2020-08-20 RX ORDER — MIRTAZAPINE 45 MG/1
45 TABLET, FILM COATED ORAL
Qty: 90 TAB | Refills: 1 | Status: SHIPPED | OUTPATIENT
Start: 2020-08-20 | End: 2021-02-12 | Stop reason: SDUPTHER

## 2020-08-20 NOTE — PROGRESS NOTES
Chief Complaint   Patient presents with    Annual Wellness Visit    Follow-up     HTN, Cholesterol       Pt is a 79y.o. year old female who presents for follow up of her chronic medical problems    BP Readings from Last 3 Encounters:   08/20/20 129/61   02/24/20 121/58   11/21/19 145/73       Wt Readings from Last 3 Encounters:   08/20/20 80 lb 6.4 oz (36.5 kg)   02/24/20 84 lb 6.4 oz (38.3 kg)   11/21/19 86 lb (39 kg)       Lab Results   Component Value Date/Time    Cholesterol, total 203 (H) 02/24/2020 09:34 AM    HDL Cholesterol 101 (H) 02/24/2020 09:34 AM    LDL, calculated 92.6 02/24/2020 09:34 AM    VLDL, calculated 9.4 02/24/2020 09:34 AM    Triglyceride 47 02/24/2020 09:34 AM    CHOL/HDL Ratio 2.0 02/24/2020 09:34 AM       Bronchiectasis      Meds reviewed: off Prolia-past due/supposed to be in April; dexa in 2019-osteo but inc bone density  Off Dexilant-on Prevacid now    Missed appt in April    Not eating as much-gets full fast, no pain  No energy    Doing Boost once a day -inc to twice a day    Prevacid for GERD    Mucinex daily for bronchiectasis    Very little cough  Has acapella devices bought by son-uses it twice a day    Body aches all over  No COVID exposure/rarely goes out    Able to drive herself      ROS:    Pt denies: Wt loss, Fever/Chills, HA, Visual changes, Fatigue, Chest pain, SOB, KUMAR, Abd pain, N/V/D/C, Blood in stool or urine, Edema. Pertinent positive as above in HPI.  All others were negative    Patient Active Problem List   Diagnosis Code    Hyperlipidemia E78.5    Essential hypertension, benign I10    Vitamin D deficiency E55.9    Advance directive discussed with patient Z70.80    Moderate single current episode of major depressive disorder ([address] F32.1    Age-related osteoporosis without current pathological fracture M81.0    Patient underweight R63.6    Bronchiectasis with acute exacerbation ([address] J47.1    MYRIAM (mycobacterium avium-intracellulare) infection ([address] A31.0    Gastroesophageal reflux disease K21.9       Past Medical History:   Diagnosis Date    Hypercholesterolemia     high in past / was on meds at that time but now off    MYRIAM (mycobacterium avium-intracellulare) infection [facility]     treated and released by Pulmonary       Current Outpatient Medications   Medication Sig Dispense Refill    guaifenesin/pseudoephedrne HCl (MUCINEX D PO) Take  by mouth.  mirtazapine (REMERON) 45 mg tablet Take 1 Tab by mouth nightly. 90 Tab 1    lansoprazole (PREVACID) 30 mg capsule Take  by mouth Daily (before breakfast).  simvastatin (ZOCOR) 20 mg tablet TAKE 1 TABLET BY MOUTH EACH NIGHT 90 Tab 2    amLODIPine (NORVASC) 10 mg tablet Take 1 Tab by mouth daily. 90 Tab 1    CALCIUM PHOSPHATE TRIB/VIT D3 (CITRACAL + D PO) Take  by mouth.  flaxseed oil 1,000 mg Cap Take  by mouth.  LYCOPENE PO Take  by mouth.  red yeast rice extract 600 mg Cap Take 600 mg by mouth now. Social History     Tobacco Use   Smoking Status Never Smoker   Smokeless Tobacco Never Used       No Known Allergies    Patient Labs were reviewed: yes      Patient Past Records were reviewed:  yes        Objective:     Vitals:    08/20/20 1113   BP: 129/61   Pulse: 80   Resp: 16   Temp: 97.6 °F (36.4 °C)   SpO2: 93%   Weight: 80 lb 6.4 oz (36.5 kg)   Height: 5' 1\" (1.549 m)     Body mass index is 15.19 kg/m². Exam:   Appearance: alert, well appearing,  oriented to person, place, and time, acyanotic, in no respiratory distress and well hydrated. HEENT:  NC/AT, pink conj, anicteric sclerae  Neck:  No cervical lymphadenopathy, no JVD, no thyromegaly, no carotid bruit  Heart:  RRR without M/R/G  Lungs:  CTAB, no rhonchi, rales, or wheezes with good air exchange   Abdomen:  Non-tender, pos bowel sounds, no hepatosplenomegaly  Ext:  No C/C/E    Skin: no rash  Neuro: no lateralizing signs, CNs II-XII intact      Assessment/ Plan:   Diagnoses and all orders for this visit:    1.  Medicare annual wellness visit, subsequent-see note below    2. Moderate single current episode of major depressive disorder (HCC)-continue with Remeron to help with appetite/weight gain  -     mirtazapine (REMERON) 45 mg tablet; Take 1 Tab by mouth nightly. 3. Essential hypertension, benign-controlled, continue with Norvasc    4. Hyperlipidemia, unspecified hyperlipidemia type-hold off on statin bec of age and bec of complaints of hurting on both shoulders/recheck lipids off med next visit in November    5. Age-related osteoporosis without current pathological fracture-on Prolia since 8/2016, with improvement in bone density with recent Dexa; due now for her next shot  Dexa done 7/2019 showed:  BONE DENSITY STUDY - CENTRAL7/5/2019  [facility]  Result Impression     1.  BMD MEASURES CONSISTENT WITH OSTEOPOROSIS. 2.  COMPARED TO THE PRECEDING STUDY, BMD HAS INCREASED. Lumbar spine levels: L1-L4  Mean bone mineral density (BMD):  0.753 g/cm2    T-score: -3.6       Z-score: -0.8      BMD increased 8.2%, which is statistically significant within a 95 percent confidence interval compared to preceding study. Left distal 1/3 radius BMD:  0.432 g/cm2    T-score: -5.1       Z-score: -2.3   BMD decreased 9.6%, which is not statistically significant within a 95 percent confidence interval compared to preceding study. Left total proximal femur BMD: 0.726 g/cm2    T-score:  -2.2       Z-score:  +0.4       BMD increased 2.0%, which is not statistically significant within a 95 percent confidence interval compared to preceding study. Right total proximal femur BMD: 0.720 g/cm2  T-score:  -2.3      Z-score:  +0.4       BMD increased 3.0%, which is not statistically significant within a 95 percent confidence interval compared to preceding study. Left femoral neck BMD:  0.696 g/cm2   T-score:  -2.5  Z-score:  +0.3    Right femoral neck BMD:  0.650 g/cm2   T-score:  -2.8  Z-score:  0.0      6.  Bronchiectasis without complication (HCC)-currently asymptomatic; continue with GERD tx (prevent aspiration) and Mucinex and use of acapella device    7. Patient underweight-advised to inc Boost supplement to 2x a day        Follow-up and Dispositions    · Return in about 3 months (around 11/20/2020) for fasting labs, follow up. I have discussed the diagnosis with the patient and the intended plan as seen in the above orders. The patient has received an After-Visit Summary and questions were answered concerning future plans. Medication Side Effects and Warnings were discussed with patient: yes    Patient verbalized understanding of above instructions. Zurdo Cruz MD  Internal Medicine  [facility]    This is the Subsequent Medicare Annual Wellness Exam, performed 12 months or more after the Initial AWV or the last Subsequent AWV    I have reviewed the patient's medical history in detail and updated the computerized patient record. History     Patient Active Problem List   Diagnosis Code    Hyperlipidemia E78.5    Essential hypertension, benign I10    Vitamin D deficiency E55.9    Advance directive discussed with patient Z70.80    Moderate single current episode of major depressive disorder ([facility] F32.1    Age-related osteoporosis without current pathological fracture M81.0    Patient underweight R63.6    Bronchiectasis with acute exacerbation ([address] J47.1    MRYIAM (mycobacterium avium-intracellulare) infection ([address] A31.0    Gastroesophageal reflux disease K21.9     Past Medical History:   Diagnosis Date    Hypercholesterolemia     high in past / was on meds at that time but now off    MYRIAM (mycobacterium avium-intracellulare) infection ([facility]     treated and released by Pulmonary      History reviewed. No pertinent surgical history. Current Outpatient Medications   Medication Sig Dispense Refill    guaifenesin/pseudoephedrne HCl (MUCINEX D PO) Take  by mouth.       mirtazapine (REMERON) 45 mg tablet Take 1 Tab by mouth nightly. 90 Tab 1    lansoprazole (PREVACID) 30 mg capsule Take  by mouth Daily (before breakfast).  simvastatin (ZOCOR) 20 mg tablet TAKE 1 TABLET BY MOUTH EACH NIGHT 90 Tab 2    amLODIPine (NORVASC) 10 mg tablet Take 1 Tab by mouth daily. 90 Tab 1    CALCIUM PHOSPHATE TRIB/VIT D3 (CITRACAL + D PO) Take  by mouth.  flaxseed oil 1,000 mg Cap Take  by mouth.  LYCOPENE PO Take  by mouth.  red yeast rice extract 600 mg Cap Take 600 mg by mouth now. No Known Allergies    History reviewed. No pertinent family history. Social History     Tobacco Use    Smoking status: Never Smoker    Smokeless tobacco: Never Used   Substance Use Topics    Alcohol use: No       Depression Risk Factor Screening:   No flowsheet data found. Alcohol Risk Factor Screening:   Do you average 1 drink per night or more than 7 drinks a week:  No    On any one occasion in the past three months have you have had more than 3 drinks containing alcohol:  No      Functional Ability and Level of Safety:   Hearing: Hearing is good. Activities of Daily Living: The home contains: no safety equipment. Patient does total self care     Ambulation: with no difficulty     Fall Risk:  Fall Risk Assessment, last 12 mths 2/24/2020   Able to walk? Yes   Fall in past 12 months?  No     Abuse Screen:  Patient is not abused       Cognitive Screening   Has your family/caregiver stated any concerns about your memory: no     Cognitive Screening: Normal - Clock Drawing Test    Patient Care Team   Patient Care Team:  Harvey Simms MD as PCP - General (Internal Medicine)  Harvey Simms MD as PCP - [facility] Empaneled Provider  Cristian Alarcon MD (Internal Medicine)  Lizzy Ferguson MD (Ophthalmology)  Bee Ramos MD (Gastroenterology)  Brenden Arellano MD (Unknown Physician Specialty)    Assessment/Plan   Education and counseling provided:  Are appropriate based on today's review and evaluation  End-of-Life planning (with patient's consent)-see ACP note  Pneumococcal Vaccine-done  Influenza Vaccine-in Oct  Cardiovascular screening blood test-Fasting lipids next visit  Bone mass measurement (DEXA)-current  Screening for glaucoma-will get a copy/done recently bec of tx for MYRIAM  Diabetes screening test-FBS next visit    Diagnoses and all orders for this visit:    1. Medicare annual wellness visit, subsequent-Refer to above for plan and to patient instructions for recommendations on HM    2.  Advanced directives, counseling/discussion        Health Maintenance Due   Topic Date Due    Shingrix Vaccine Age 49> (1 of 2) 04/04/1989    GLAUCOMA SCREENING Q2Y  06/26/2020     RTC yearly for wellness visit

## 2020-08-20 NOTE — PATIENT INSTRUCTIONS
Medicare Wellness Visit, Female The best way to live healthy is to have a lifestyle where you eat a well-balanced diet, exercise regularly, limit alcohol use, and quit all forms of tobacco/nicotine, if applicable. Regular preventive services are another way to keep healthy. Preventive services (vaccines, screening tests, monitoring & exams) can help personalize your care plan, which helps you manage your own care. Screening tests can find health problems at the earliest stages, when they are easiest to treat. [facility] follows the current, evidence-based guidelines published by the [facility]ren[facility] ([facility]) when recommending preventive services for our patients. Because we follow these guidelines, sometimes recommendations change over time as research supports it. (For example, mammograms used to be recommended annually. Even though Medicare will still pay for an annual mammogram, the newer guidelines recommend a mammogram every two years for women of average risk). Of course, you and your doctor may decide to screen more often for some diseases, based on your risk and your co-morbidities (chronic disease you are already diagnosed with). Preventive services for you include: - Medicare offers their members a free annual wellness visit, which is time for you and your primary care provider to discuss and plan for your preventive service needs. Take advantage of this benefit every year! 
-All adults over the age of 72 should receive the recommended pneumonia vaccines. Current USPSTF guidelines recommend a series of two vaccines for the best pneumonia protection.  
-All adults should have a flu vaccine yearly and a tetanus vaccine every 10 years.  
-All adults age 48 and older should receive the shingles vaccines (series of two vaccines). -All adults age 38-68 who are overweight should have a diabetes screening test once every three years. -All adults born between [birth year] and 1966 should be screened once for Hepatitis C. 
-Other screening tests and preventive services for persons with diabetes include: an eye exam to screen for diabetic retinopathy, a kidney function test, a foot exam, and stricter control over your cholesterol.  
-Cardiovascular screening for adults with routine risk involves an electrocardiogram (ECG) at intervals determined by your doctor.  
-Colorectal cancer screenings should be done for adults age 54-65 with no increased risk factors for colorectal cancer. There are a number of acceptable methods of screening for this type of cancer. Each test has its own benefits and drawbacks. Discuss with your doctor what is most appropriate for you during your annual wellness visit. The different tests include: colonoscopy (considered the best screening method), a fecal occult blood test, a fecal DNA test, and sigmoidoscopy. 
 
-A bone mass density test is recommended when a woman turns 65 to screen for osteoporosis. This test is only recommended one time, as a screening. Some providers will use this same test as a disease monitoring tool if you already have osteoporosis. -Breast cancer screenings are recommended every other year for women of normal risk, age 54-69. 
-Cervical cancer screenings for women over age 72 are only recommended with certain risk factors. Here is a list of your current Health Maintenance items (your personalized list of preventive services) with a due date: 
Health Maintenance Due Topic Date Due  Shingles Vaccine (1 of 2) 04/04/1989  Glaucoma Screening   06/26/2020 [address] [place] [address] Annual Well Visit  08/19/2020

## 2020-08-20 NOTE — PROGRESS NOTES
Chief Complaint   Patient presents with    Annual Wellness Visit    Follow-up     HTN, Cholesterol     1. Have you been to the ER, urgent care clinic since your last visit? Hospitalized since your last visit? No    2. Have you seen or consulted any other health care providers outside of the [address] [place] [address] since your last visit? Include any pap smears or colon screening.  No     .

## 2020-08-21 NOTE — ACP (ADVANCE CARE PLANNING)
Advance Care Planning       Advance Care Planning (ACP) Physician/NP/PA (Provider) Conversation        Date of ACP Conversation: 8/20/2020    Conversation Conducted with:   Patient with [address] Maker:    Current Designated Health Care Decision Maker:   Primary Decision Maker: Satinder Bender - Child - [phone]    Supplemental (Other) Decision Maker: Ethan Garcia - Nitish - [phone]  (If there is a [address] named in the \"Healthcare Decision Makers\" box in the ACP activity, but it is not visible above, be sure to open that field and then select the health care decision maker relationship (ie \"primary\") in the blank space to the right of the name.)    Note: Assess and validate information in current ACP documents, as indicated. If no Authorized Decision Maker has previously been identified, then patient chooses [address]:  \"Who would you like to name as your primary health care decision-maker? \"    Name: Satinder   Relationship: son Phone number: see demographics  \"Can this person be reached easily? \" YES  \"Who would you like to name as your back-up decision maker? \"   Name: see list  Relationship: other son who lives in [place] Phone number: see demographics  \"Can this person be reached easily? \" YES    Note: If the relationship of these Decision-Makers to the patient does NOT follow your state's Next of Kin hierarchy, recommend that patient complete ACP document that meets state-specific requirements to allow them to act on the patient's behalf when appropriate. Care Preferences:    Hospitalization: \"If your health worsens and it becomes clear that your chance of recovery is unlikely, what would your preference be regarding hospitalization? \"  If the patient would want hospitalization, answer \"yes\". If the patient would prefer comfort-focused treatment without hospitalization, answer \"no\". no      Ventilation:   \"If you were in your present state of health and suddenly became very ill and were unable to breathe on your own, what would your preference be about the use of a ventilator (breathing machine) if it was available to you? \"    If patient would desire the use of a ventilator (breathing machine), answer \"yes\", if not answer \"no\":yes    \"If your health worsens and it becomes clear that your chance of recovery is unlikely, what would your preference be about the use of a ventilator (breathing machine) if it was available to you? \"   no      Resuscitation:  \"CPR works best to restart the heart when there is a sudden event, like a heart attack, in someone who is otherwise healthy. Unfortunately, CPR does not typically restart the heart for people who have serious health conditions or who are very sick. \"    \"In the event your heart stopped as a result of an underlying serious health condition, would you want attempts to be made to restart your heart (answer \"yes\" for attempt to resuscitate) or would you prefer a natural death (answer \"no\" for do not attempt to resuscitate)? \"   no    NOTE: If the patient has a valid advance directive AND provides care preference(s) that are inconsistent with that prior directive, advise the patient to consider either: creating a new advance directive that complies with state-specific requirements; or, if that is not possible, orally revoking that prior directive in accordance with state-specific requirements, which must be documented in the EHR.     Conversation Outcomes / Follow-Up Plan:   reviewed existing Adv directive with patient and her son Norm      Length of Voluntary ACP Conversation in minutes:  <16 minutes (Non-Billable)      Candi Santos MD

## 2021-05-26 NOTE — PROGRESS NOTES
Assessment/ Plan:   Diagnoses and all orders for this visit:    1. Essential hypertension, benign-BP on the low side which is expected bec of the continued weight loss-will dec Norvasc to 5 mg daily  -     CBC WITH AUTOMATED DIFF; Future  -     METABOLIC PANEL, COMPREHENSIVE; Future  -     amLODIPine (NORVASC) 5 mg tablet; Take 1 Tablet by mouth daily. 2. Numbness and tingling of both feet-persistent, etio? Could be nutritional so we will check B12/folate/TSH/blood sugar    3. Weight loss-advised to try Glucerna as the Boost is too sweet for her  -     TSH 3RD GENERATION; Future    4. Hyperlipidemia, unspecified hyperlipidemia type-currently off statin bec of the weight loss  -     LIPID PANEL; Future    5. Moderate single current episode of major depressive disorder (HCC)-continue with 30 mg Remeron as this can also help her appetite    6. Bronchiectasis without complication (HCC)-no inc in cough or secretions; advised to follow up with Pulmo    7. Vitamin D deficiency-on OTC vit D  -     VITAMIN D, 25 HYDROXY; Future    8. Age-related osteoporosis without current pathological fracture-last Prolia shot was over a year ago bec of the pancdemic; now that she is vaccinated, son is willing to get her to the infusion site once again; continue with daily Ca+D  Lumbar spine levels: L1-L4   Mean bone mineral density (BMD):  0.753 g/cm2     T-score: -3.6       Z-score: -0.8       BMD increased 8.2%, which is statistically significant within a 95 percent confidence interval compared to preceding study. Left distal 1/3 radius BMD:  0.432 g/cm2     T-score: -5.1       Z-score: -2.3   BMD decreased 9.6%, which is not statistically significant within a 95 percent confidence interval compared to preceding study.      Left total proximal femur BMD: 0.726 g/cm2     T-score:  -2.2       Z-score:  +0.4       BMD increased 2.0%, which is not statistically significant within a 95 percent confidence interval compared to preceding study. Right total proximal femur BMD: 0.720 g/cm2   T-score:  -2.3       Z-score:  +0.4       BMD increased 3.0%, which is not statistically significant within a 95 percent confidence interval compared to preceding study. Left femoral neck BMD:  0.696 g/cm2   T-score:  -2.5   Z-score:  +0.3     Right femoral neck BMD:  0.650 g/cm2   T-score:  -2.8   Z-score:  0.0      9. GERD-on daily PPI    Follow-up and Dispositions    · Return in about 3 months (around 8/27/2021) for follow up. Chief Complaint   Patient presents with    Follow Up Chronic Condition    Foot Injury     Right, numb [id number]       Pt is a 79y.o. year old female who presents for follow up of her chronic medical problems    Health Maintenance Due   Topic Date Due    COVID-19 Vaccine (1)-done Never done    Shingrix Vaccine Age 50> (1 of 2) Never done    Lipid Screen -today 02/24/2021        bilat lower leg numbness and tightness but no swelling-ongoing for a few months    Wt Readings from Last 3 Encounters:   05/27/21 81 lb 9.6 oz (37 kg)   08/20/20 80 lb 6.4 oz (36.5 kg)   02/24/20 84 lb 6.4 oz (38.3 kg)   eats lots of veg but not a lot of anything  On supplement-does not like it      BP Readings from Last 3 Encounters:   05/27/21 (!) 122/54   08/20/20 129/61   02/24/20 121/58   took meds already  On Norvasc 10 mg daily    No abd pain-takes her Prevacid daily    No inc in coughing      Off cholesterol med since last visit    Remeron-?does not recall the dose    No doc visits since last seen  ROS:    Pt denies: Wt loss, Fever/Chills, HA, Visual changes, Fatigue, Chest pain, SOB, KUMAR, Abd pain, N/V/D/C, Blood in stool or urine, Edema. Pertinent positive as above in HPI.  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  lansoprazole (PREVACID) 30 mg capsule Take  by mouth Daily (before breakfast).  CALCIUM PHOSPHATE TRIB/VIT D3 (CITRACAL + D PO) Take  by mouth.  flaxseed oil 1,000 mg Cap Take  by mouth.  red yeast rice extract 600 mg Cap Take 600 mg by mouth now.  mirtazapine (REMERON) 45 mg tablet Take 1 Tab by mouth nightly. (Patient taking differently: Take 30 mg by mouth nightly.) 90 Tab 1                     Social History     Tobacco Use   Smoking Status Never Smoker   Smokeless Tobacco Never Used       No Known Allergies    Patient Labs were reviewed: yes    Patient Past Records were reviewed: yes      Objective:     Vitals:    05/27/21 0947   BP: (!) 122/54   Pulse: 85   Resp: 16   Temp: 98.4 °F (36.9 °C)   TempSrc: Temporal   Weight: 81 lb 9.6 oz (37 kg)   Height: 5' 1\" (1.549 m)     Body mass index is 15.42 kg/m². Exam:   Appearance: alert, well appearing,  oriented to person, place, and time, acyanotic, in no respiratory distress and well hydrated.   HEENT:  NC/AT, pink conj, anicteric sclerae  Neck:  No cervical lymphadenopathy, no JVD, no thyromegaly, no carotid bruit  Heart:  RRR without M/R/G  Lungs:  CTAB, no rhonchi, rales, or wheezes with good air exchange   Abdomen:  Non-tender, pos bowel sounds, no hepatosplenomegaly  Ext:  No C/C/E    Skin: no rash  Neuro: no lateralizing signs, CNs II-XII intact            I have discussed the diagnosis with the patient and the intended plan as seen in the above orders. The patient has received an After-Visit Summary and questions were answered concerning future plans. Medication Side Effects and Warnings were discussed with patient: yes    Patient verbalized understanding of above instructions.     Lucila Holly MD  Internal Medicine  [facility]

## 2021-05-27 ENCOUNTER — OFFICE VISIT (OUTPATIENT)
Dept: FAMILY MEDICINE CLINIC | Age: 81
End: 2021-05-27
Payer: MEDICARE

## 2021-05-27 ENCOUNTER — HOSPITAL ENCOUNTER (OUTPATIENT)
Dept: LAB | Age: 81
Discharge: HOME | End: 2021-05-27
Payer: MEDICARE

## 2021-05-27 VITALS
HEIGHT: 61 IN | DIASTOLIC BLOOD PRESSURE: 54 MMHG | HEART RATE: 85 BPM | BODY MASS INDEX: 15.4 KG/M2 | WEIGHT: 81.6 LBS | TEMPERATURE: 98.4 F | SYSTOLIC BLOOD PRESSURE: 122 MMHG | RESPIRATION RATE: 16 BRPM

## 2021-05-27 DIAGNOSIS — E55.9 VITAMIN D DEFICIENCY: ICD-10-CM

## 2021-05-27 DIAGNOSIS — I10 ESSENTIAL HYPERTENSION, BENIGN: Primary | ICD-10-CM

## 2021-05-27 DIAGNOSIS — F32.1 MODERATE SINGLE CURRENT EPISODE OF MAJOR DEPRESSIVE DISORDER (HCC): ICD-10-CM

## 2021-05-27 DIAGNOSIS — R20.2 NUMBNESS AND TINGLING OF BOTH FEET: ICD-10-CM

## 2021-05-27 DIAGNOSIS — R63.4 WEIGHT LOSS: ICD-10-CM

## 2021-05-27 DIAGNOSIS — R20.0 NUMBNESS AND TINGLING OF BOTH FEET: ICD-10-CM

## 2021-05-27 DIAGNOSIS — E78.5 HYPERLIPIDEMIA, UNSPECIFIED HYPERLIPIDEMIA TYPE: ICD-10-CM

## 2021-05-27 DIAGNOSIS — I10 ESSENTIAL HYPERTENSION, BENIGN: ICD-10-CM

## 2021-05-27 DIAGNOSIS — K21.9 GASTROESOPHAGEAL REFLUX DISEASE, UNSPECIFIED WHETHER ESOPHAGITIS PRESENT: ICD-10-CM

## 2021-05-27 DIAGNOSIS — J47.9 BRONCHIECTASIS WITHOUT COMPLICATION (HCC): ICD-10-CM

## 2021-05-27 DIAGNOSIS — M81.0 AGE-RELATED OSTEOPOROSIS WITHOUT CURRENT PATHOLOGICAL FRACTURE: ICD-10-CM

## 2021-05-27 LAB
25(OH)D3 SERPL-MCNC: 28.3 NG/ML (ref 30–100)
ALBUMIN SERPL-MCNC: 3.9 G/DL (ref 3.4–5)
ALBUMIN/GLOB SERPL: 0.9 {RATIO} (ref 0.8–1.7)
ALP SERPL-CCNC: 93 U/L (ref 45–117)
ALT SERPL-CCNC: 19 U/L (ref 13–56)
ANION GAP SERPL CALC-SCNC: 6 MMOL/L (ref 3–18)
AST SERPL-CCNC: 21 U/L (ref 10–38)
BASOPHILS # BLD: 0 K/UL (ref 0–0.1)
BASOPHILS NFR BLD: 1 % (ref 0–2)
BILIRUB SERPL-MCNC: 0.3 MG/DL (ref 0.2–1)
BUN SERPL-MCNC: 14 MG/DL (ref 7–18)
BUN/CREAT SERPL: 24 (ref 12–20)
CALCIUM SERPL-MCNC: 9.1 MG/DL (ref 8.5–10.1)
CHLORIDE SERPL-SCNC: 97 MMOL/L (ref 100–111)
CHOLEST SERPL-MCNC: 241 MG/DL
CO2 SERPL-SCNC: 30 MMOL/L (ref 21–32)
CREAT SERPL-MCNC: 0.59 MG/DL (ref 0.6–1.3)
DIFFERENTIAL METHOD BLD: NORMAL
EOSINOPHIL # BLD: 0.1 K/UL (ref 0–0.4)
EOSINOPHIL NFR BLD: 2 % (ref 0–5)
ERYTHROCYTE [DISTWIDTH] IN BLOOD BY AUTOMATED COUNT: 13 % (ref 11.6–14.5)
FOLATE SERPL-MCNC: >20 NG/ML (ref 3.1–17.5)
GLOBULIN SER CALC-MCNC: 4.2 G/DL (ref 2–4)
GLUCOSE SERPL-MCNC: 91 MG/DL (ref 74–99)
HCT VFR BLD AUTO: 43.1 % (ref 35–45)
HDLC SERPL-MCNC: 98 MG/DL (ref 40–60)
HDLC SERPL: 2.5 {RATIO} (ref 0–5)
HGB BLD-MCNC: 13.8 G/DL (ref 12–16)
LDLC SERPL CALC-MCNC: 132.2 MG/DL (ref 0–100)
LIPID PROFILE,FLP: ABNORMAL
LYMPHOCYTES # BLD: 1.9 K/UL (ref 0.9–3.6)
LYMPHOCYTES NFR BLD: 29 % (ref 21–52)
MCH RBC QN AUTO: 30.5 PG (ref 24–34)
MCHC RBC AUTO-ENTMCNC: 32 G/DL (ref 31–37)
MCV RBC AUTO: 95.1 FL (ref 74–97)
MONOCYTES # BLD: 0.5 K/UL (ref 0.05–1.2)
MONOCYTES NFR BLD: 7 % (ref 3–10)
NEUTS SEG # BLD: 4.1 K/UL (ref 1.8–8)
NEUTS SEG NFR BLD: 61 % (ref 40–73)
PLATELET # BLD AUTO: 244 K/UL (ref 135–420)
PMV BLD AUTO: 9.9 FL (ref 9.2–11.8)
POTASSIUM SERPL-SCNC: 4.4 MMOL/L (ref 3.5–5.5)
PROT SERPL-MCNC: 8.1 G/DL (ref 6.4–8.2)
RBC # BLD AUTO: 4.53 M/UL (ref 4.2–5.3)
SODIUM SERPL-SCNC: 133 MMOL/L (ref 136–145)
TRIGL SERPL-MCNC: 54 MG/DL (ref ?–150)
TSH SERPL DL<=0.05 MIU/L-ACNC: 1.91 UIU/ML (ref 0.36–3.74)
VIT B12 SERPL-MCNC: 708 PG/ML (ref 211–911)
VLDLC SERPL CALC-MCNC: 10.8 MG/DL
WBC # BLD AUTO: 6.6 K/UL (ref 4.6–13.2)

## 2021-05-27 PROCEDURE — G8536 NO DOC ELDER MAL SCRN: HCPCS | Performed by: INTERNAL MEDICINE

## 2021-05-27 PROCEDURE — 1101F PT FALLS ASSESS-DOCD LE1/YR: CPT | Performed by: INTERNAL MEDICINE

## 2021-05-27 PROCEDURE — 82746 ASSAY OF FOLIC ACID SERUM: CPT

## 2021-05-27 PROCEDURE — G9717 DOC PT DX DEP/BP F/U NT REQ: HCPCS | Performed by: INTERNAL MEDICINE

## 2021-05-27 PROCEDURE — 99214 OFFICE O/P EST MOD 30 MIN: CPT | Performed by: INTERNAL MEDICINE

## 2021-05-27 PROCEDURE — G8427 DOCREV CUR MEDS BY ELIG CLIN: HCPCS | Performed by: INTERNAL MEDICINE

## 2021-05-27 PROCEDURE — 80061 LIPID PANEL: CPT

## 2021-05-27 PROCEDURE — G8419 CALC BMI OUT NRM PARAM NOF/U: HCPCS | Performed by: INTERNAL MEDICINE

## 2021-05-27 PROCEDURE — 85025 COMPLETE CBC W/AUTO DIFF WBC: CPT

## 2021-05-27 PROCEDURE — 82607 VITAMIN B-12: CPT

## 2021-05-27 PROCEDURE — 1090F PRES/ABSN URINE INCON ASSESS: CPT | Performed by: INTERNAL MEDICINE

## 2021-05-27 PROCEDURE — 80053 COMPREHEN METABOLIC PANEL: CPT

## 2021-05-27 PROCEDURE — 82306 VITAMIN D 25 HYDROXY: CPT

## 2021-05-27 PROCEDURE — 36415 COLL VENOUS BLD VENIPUNCTURE: CPT

## 2021-05-27 PROCEDURE — G8754 DIAS BP LESS 90: HCPCS | Performed by: INTERNAL MEDICINE

## 2021-05-27 PROCEDURE — G8752 SYS BP LESS 140: HCPCS | Performed by: INTERNAL MEDICINE

## 2021-05-27 PROCEDURE — 84443 ASSAY THYROID STIM HORMONE: CPT

## 2021-05-27 RX ORDER — AMLODIPINE BESYLATE 5 MG/1
5 TABLET ORAL DAILY
Qty: 90 TABLET | Refills: 1 | Status: SHIPPED
Start: 2021-05-27 | End: 2021-09-09 | Stop reason: DRUGHIGH

## 2021-05-27 NOTE — PATIENT INSTRUCTIONS
Learning About Peripheral Neuropathy What is peripheral neuropathy? Peripheral neuropathy is a problem that affects the peripheral nerves. These nerves lead from the spinal cord to other parts of the body. They control your sense of touch, how you feel pain and temperature, and your muscle strength. Most of the time the problem starts in the fingers and toes. As it gets worse, it moves into the limbs. It can cause pain and loss of feeling in the feet, legs, and hands. What causes it? There are several causes: · Diabetes. This is the most common cause. If your blood sugar is too high for too long, it can damage the nerves. · Kidney problems. These can lead to toxic substances in the blood that damage nerves. · Low levels of thyroid hormone (hypothyroidism). This may cause swelling of the tissues around the nerves, which can put pressure on them. · Infectious or inflammatory diseases. Examples are HIV and Guillain-Barré syndrome. These diseases can damage the nerves. · Peripheral nerve injury. A physical injury can damage the nerves. Injuries can be from things like falls, car crashes, or playing sports. · Overusing alcohol and not eating a healthy diet. These can lead to your body not having enough of certain vitamins, such as vitamin B-12. This can damage nerves. · Being exposed to toxic substances. These include lead, mercury, and certain medicines, such as those used for chemotherapy. Sometimes the cause isn't known. What are the symptoms? Symptoms of peripheral neuropathy can occur slowly over time. The most common ones are: · Numbness, tightness, and tingling, especially in the legs, hands, and feet. · Loss of feeling. · Burning, shooting, or stabbing pain in the legs, hands, and feet. Often the pain is worse at night. · Weakness and loss of balance. What can happen if you have it? If peripheral neuropathy gets worse, it can lead to a complete lack of feeling in your hands or feet. This can make you more likely to injure them. It may lead to calluses and blisters. It can also lead to bone and joint problems, infection, and ulcers. For instance, small, repeated injuries to the foot may lead to bigger problems. This can happen because you can't feel the injuries. Reduced feeling in the feet can also change your step, leading to bone or joint problems. If untreated, foot problems can become so severe that the foot or lower leg may have to be amputated. But treatment can slow down peripheral neuropathy. And it's a good idea to take care to avoid injury. How is it diagnosed? To diagnose peripheral neuropathy, your doctor will ask you about: 
· Your symptoms. · Your medical history. This may include your use of alcohol, risk of HIV infection, or exposure to toxic substances. · Your family's medical history, including nerve disease. Your doctor may test how well you can feel touch, temperature, and pain. You may also have blood tests. These tests will help the doctor find out if you have conditions that can cause neuropathy. Examples are diabetes, vitamin deficiencies, thyroid disease, and kidney problems. How is it treated? Treatment for peripheral neuropathy can relieve symptoms. This is done by treating the health problem that's causing it. For example, if you have diabetes, keeping your blood sugar within your target range may help. Or maybe your body lacks certain vitamins caused by drinking too much alcohol. In that case, treatment may include eating a healthy diet, taking vitamins, and stopping alcohol use. You may have physical therapy. This can increase muscle strength and help build muscle control. Over-the-counter medicine can relieve mild nerve pain. Your doctor may also prescribe medicine to help with severe pain, numbness, tingling, and weakness. If you have neuropathy in your feet, it's a good idea to have them checked during each office visit.  This can help prevent problems. Some people find that physical therapy, acupuncture, or transcutaneous electrical nerve stimulation (TENS) helps relieve pain. Follow-up care is a key part of your treatment and safety. Be sure to make and go to all appointments, and call your doctor if you are having problems. It's also a good idea to know your test results and keep a list of the medicines you take. Where can you learn more? Go to http://www.gray.com/ Enter P130 in the search box to learn more about \"Learning About Peripheral Neuropathy. \" Current as of: August 31, 2020               Content Version: 12.8 © 0360-1587 [employer]. Care instructions adapted under license by [employer] (which disclaims liability or warranty for this information). If you have questions about a medical condition or this instruction, always ask your healthcare professional. [address] any warranty or liability for your use of this information.

## 2021-05-27 NOTE — PROGRESS NOTES
1. Have you been to the ER, urgent care clinic since your last visit? Hospitalized since your last visit? No    2. Have you seen or consulted any other health care providers outside of the [address] [place] [address] since your last visit? Include any pap smears or colon screening.  No     Health Maintenance Due   Topic Date Due    Shingrix Vaccine Age 49> (1 of 2) Never done    Lipid Screen  02/24/2021

## 2021-08-05 NOTE — PROGRESS NOTES
Vit D is still on the low side-on OTC vit D  Cholesterol is up after stopping the statin but HDL or good cholesterol is way up so continue to be off statin gwen with age/weight loss  Labs for neuropathy are normal

## 2021-09-09 ENCOUNTER — OFFICE VISIT (OUTPATIENT)
Dept: FAMILY MEDICINE CLINIC | Age: 81
End: 2021-09-09
Payer: MEDICARE

## 2021-09-09 VITALS
HEIGHT: 61 IN | DIASTOLIC BLOOD PRESSURE: 56 MMHG | WEIGHT: 89.6 LBS | SYSTOLIC BLOOD PRESSURE: 116 MMHG | BODY MASS INDEX: 16.92 KG/M2 | TEMPERATURE: 98.2 F | HEART RATE: 83 BPM | RESPIRATION RATE: 16 BRPM | OXYGEN SATURATION: 96 %

## 2021-09-09 DIAGNOSIS — M81.0 AGE-RELATED OSTEOPOROSIS WITHOUT CURRENT PATHOLOGICAL FRACTURE: ICD-10-CM

## 2021-09-09 DIAGNOSIS — I10 ESSENTIAL HYPERTENSION, BENIGN: ICD-10-CM

## 2021-09-09 DIAGNOSIS — E78.5 HYPERLIPIDEMIA, UNSPECIFIED HYPERLIPIDEMIA TYPE: ICD-10-CM

## 2021-09-09 DIAGNOSIS — Z23 ENCOUNTER FOR IMMUNIZATION: ICD-10-CM

## 2021-09-09 DIAGNOSIS — F32.1 MODERATE SINGLE CURRENT EPISODE OF MAJOR DEPRESSIVE DISORDER (HCC): ICD-10-CM

## 2021-09-09 DIAGNOSIS — J47.9 BRONCHIECTASIS WITHOUT COMPLICATION (HCC): ICD-10-CM

## 2021-09-09 DIAGNOSIS — Z00.00 MEDICARE ANNUAL WELLNESS VISIT, SUBSEQUENT: Primary | ICD-10-CM

## 2021-09-09 PROCEDURE — G8536 NO DOC ELDER MAL SCRN: HCPCS | Performed by: INTERNAL MEDICINE

## 2021-09-09 PROCEDURE — 1101F PT FALLS ASSESS-DOCD LE1/YR: CPT | Performed by: INTERNAL MEDICINE

## 2021-09-09 PROCEDURE — G0439 PPPS, SUBSEQ VISIT: HCPCS | Performed by: INTERNAL MEDICINE

## 2021-09-09 PROCEDURE — 1090F PRES/ABSN URINE INCON ASSESS: CPT | Performed by: INTERNAL MEDICINE

## 2021-09-09 PROCEDURE — G8752 SYS BP LESS 140: HCPCS | Performed by: INTERNAL MEDICINE

## 2021-09-09 PROCEDURE — G8427 DOCREV CUR MEDS BY ELIG CLIN: HCPCS | Performed by: INTERNAL MEDICINE

## 2021-09-09 PROCEDURE — 99214 OFFICE O/P EST MOD 30 MIN: CPT | Performed by: INTERNAL MEDICINE

## 2021-09-09 PROCEDURE — G0008 ADMIN INFLUENZA VIRUS VAC: HCPCS | Performed by: INTERNAL MEDICINE

## 2021-09-09 PROCEDURE — G8419 CALC BMI OUT NRM PARAM NOF/U: HCPCS | Performed by: INTERNAL MEDICINE

## 2021-09-09 PROCEDURE — G8754 DIAS BP LESS 90: HCPCS | Performed by: INTERNAL MEDICINE

## 2021-09-09 PROCEDURE — 90694 VACC AIIV4 NO PRSRV 0.5ML IM: CPT | Performed by: INTERNAL MEDICINE

## 2021-09-09 PROCEDURE — G9717 DOC PT DX DEP/BP F/U NT REQ: HCPCS | Performed by: INTERNAL MEDICINE

## 2021-09-09 RX ORDER — MIRTAZAPINE 45 MG/1
45 TABLET, FILM COATED ORAL
Qty: 90 TABLET | Refills: 1 | Status: SHIPPED | OUTPATIENT
Start: 2021-09-09 | End: 2022-05-12

## 2021-09-09 RX ORDER — AMLODIPINE BESYLATE 2.5 MG/1
2.5 TABLET ORAL DAILY
Qty: 90 TABLET | Refills: 1 | Status: SHIPPED | OUTPATIENT
Start: 2021-09-09 | End: 2021-12-09 | Stop reason: DRUGHIGH

## 2021-09-09 NOTE — PATIENT INSTRUCTIONS
Bronchiectasis: Care Instructions  Your Care Instructions  Bronchiectasis (say \"thoni-wxb-NGI-tuh-juan\") is a lung problem in which the breathing tubes are stretched and become larger. The buildup of mucus causes the stretching and can lead to swelling and infections. You may find it harder to breathe and cough up mucus out of your lungs. Some people are born with it. Other people get it because of another health problem, usually cystic fibrosis or a lung infection such as pneumonia or tuberculosis. Symptoms are often different for everyone. But a cough that brings up mucus, or sputum, is common. The condition is usually treated with antibiotics, medicines to relax the airways (bronchodilators), and medicines to make it easier to cough up mucus (expectorants). Follow-up care is a key part of your treatment and safety. Be sure to make and go to all appointments, and call your doctor if you are having problems. It's also a good idea to know your test results and keep a list of the medicines you take. How can you care for yourself at home? · Take your antibiotics as directed. Do not stop taking them just because you feel better. You need to take the full course of antibiotics. · Take your medicines exactly as prescribed. Call your doctor if you think you are having a problem with your medicine. · If you have cystic fibrosis, follow your treatment plan. · If you or your child has bronchiectasis, follow directions from your doctor or respiratory therapist for moving your or your child's body into different positions to help drain fluid. This is called postural drainage, and it helps to ease breathing and prevent infections. · You also may do chest percussion on your child. This is strong clapping of the chest with a cupped hand to vibrate the airways in the lungs. The vibration helps your child cough up mucus. You may see a respiratory therapist to learn how to do chest percussion.   · Use an airway clearance device, such as a flutter valve, as directed to help remove mucus from the lungs. · Avoid lung infections. ? Get shots to protect against the flu and pneumococcal disease. ? Wash your hands frequently. ? Avoid close contact with people who have colds or the flu. ? Do not smoke or allow others to smoke around you. If you need help quitting, talk to your doctor about stop-smoking programs and medicines. These can increase your chances of quitting for good. ? Stay inside, if you can, on days when the pollution level is high. When should you call for help? Call 911 anytime you think you may need emergency care. For example, call if:    · You have severe trouble breathing.     · You cough up more than 1 cup of blood. Call your doctor now or seek immediate medical care if:    · You have chest pain.     · You have shortness of breath.     · You have a fever.     · Your mucus (sputum) is bloody or changes color. Watch closely for changes in your health, and be sure to contact your doctor if:    · You are coughing up more sputum than before.     · Your symptoms get worse or do not get better with treatment. Where can you learn more? Go to http://www.gray.com/  Enter C667 in the search box to learn more about \"Bronchiectasis: Care Instructions. \"  Current as of: October 26, 2020               Content Version: 12.8  © 5546-0777 [employer]. Care instructions adapted under license by [employer] (which disclaims liability or warranty for this information). If you have questions about a medical condition or this instruction, always ask your healthcare professional. [place] [address] any warranty or liability for your use of this information.       Medicare Wellness Visit, Female     The best way to live healthy is to have a lifestyle where you eat a well-balanced diet, exercise regularly, limit alcohol use, and quit all forms of tobacco/nicotine, if applicable. Regular preventive services are another way to keep healthy. Preventive services (vaccines, screening tests, monitoring & exams) can help personalize your care plan, which helps you manage your own care. Screening tests can find health problems at the earliest stages, when they are easiest to treat. [facility] follows the current, evidence-based guidelines published by the [facility] ([facility]) when recommending preventive services for our patients. Because we follow these guidelines, sometimes recommendations change over time as research supports it. (For example, mammograms used to be recommended annually. Even though Medicare will still pay for an annual mammogram, the newer guidelines recommend a mammogram every two years for women of average risk). Of course, you and your doctor may decide to screen more often for some diseases, based on your risk and your co-morbidities (chronic disease you are already diagnosed with). Preventive services for you include:  - Medicare offers their members a free annual wellness visit, which is time for you and your primary care provider to discuss and plan for your preventive service needs. Take advantage of this benefit every year!  -All adults over the age of 72 should receive the recommended pneumonia vaccines. Current USPSTF guidelines recommend a series of two vaccines for the best pneumonia protection.   -All adults should have a flu vaccine yearly and a tetanus vaccine every 10 years.   -All adults age 48 and older should receive the shingles vaccines (series of two vaccines).       -All adults age 38-68 who are overweight should have a diabetes screening test once every three years.   -All adults born between [birth year] and 1966 should be screened once for Hepatitis C.  -Other screening tests and preventive services for persons with diabetes include: an eye exam to screen for diabetic retinopathy, a kidney function test, a foot exam, and stricter control over your cholesterol.   -Cardiovascular screening for adults with routine risk involves an electrocardiogram (ECG) at intervals determined by your doctor.   -Colorectal cancer screenings should be done for adults age 54-65 with no increased risk factors for colorectal cancer. There are a number of acceptable methods of screening for this type of cancer. Each test has its own benefits and drawbacks. Discuss with your doctor what is most appropriate for you during your annual wellness visit. The different tests include: colonoscopy (considered the best screening method), a fecal occult blood test, a fecal DNA test, and sigmoidoscopy.    -A bone mass density test is recommended when a woman turns 65 to screen for osteoporosis. This test is only recommended one time, as a screening. Some providers will use this same test as a disease monitoring tool if you already have osteoporosis. -Breast cancer screenings are recommended every other year for women of normal risk, age 54-69.  -Cervical cancer screenings for women over age 72 are only recommended with certain risk factors.      Here is a list of your current Health Maintenance items (your personalized list of preventive services) with a due date:  Health Maintenance Due   Topic Date Due    Shingles Vaccine (1 of 2) Never done    Yearly Flu Vaccine (1) 09/01/2021

## 2021-09-09 NOTE — Clinical Note
Pls do prior auth for Prolia/sched her shortly at [facility]; pls call her son Norm and can leave message on his phone for the appt

## 2021-09-09 NOTE — PROGRESS NOTES
Assessment/ Plan:   Diagnoses and all orders for this visit:    1. Medicare annual wellness visit, subsequent-see note below    2. Essential hypertension, benign-BP on the low side so will dec Norvasc dose  -     amLODIPine (NORVASC) 2.5 mg tablet; Take 1 Tablet by mouth daily. 3. Age-related osteoporosis without current pathological fracture-last Prolia dose was over 1 yr ago bec of the pandemic and previously tolerated it well; son agreed to restart this which I agree with because patient is mobile and lives by herself  Continue with daily Ca+D, walk for exercise  Dexa from 2019:  Lumbar spine levels: L1-L4   Mean bone mineral density (BMD):  0.753 g/cm2     T-score: -3.6       Z-score: -0.8       BMD increased 8.2%, which is statistically significant within a 95 percent confidence interval compared to preceding study. Left distal 1/3 radius BMD:  0.432 g/cm2     T-score: -5.1       Z-score: -2.3   BMD decreased 9.6%, which is not statistically significant within a 95 percent confidence interval compared to preceding study. Left total proximal femur BMD: 0.726 g/cm2     T-score:  -2.2       Z-score:  +0.4       BMD increased 2.0%, which is not statistically significant within a 95 percent confidence interval compared to preceding study. Right total proximal femur BMD: 0.720 g/cm2   T-score:  -2.3       Z-score:  +0.4       BMD increased 3.0%, which is not statistically significant within a 95 percent confidence interval compared to preceding study. Left femoral neck BMD:  0.696 g/cm2   T-score:  -2.5   Z-score:  +0.3     Right femoral neck BMD:  0.650 g/cm2   T-score:  -2.8   Z-score:  0.0      4. Moderate single current episode of major depressive disorder (HCC)-doing well on higher dose of Remeron (weight went up)  -     mirtazapine (REMERON) 45 mg tablet; Take 1 Tablet by mouth nightly.     5. Hyperlipidemia, unspecified hyperlipidemia type-currently off statin, check fasting lipids next visit    6. Bronchiectasis without complication (HCC)-continue with daily Mucinex, PPI to prevent reflux and possible aspiration        Follow-up and Dispositions    · Return in about 3 months (around 12/9/2021) for follow up. Routing History                      Chief Complaint   Patient presents with    Annual Wellness Visit    Follow Up Chronic Condition       Pt is a 79y.o. year old female who presents for follow up of her chronic medical problems    Health Maintenance Due   Topic Date Due    Shingrix Vaccine Age 49> (1 of 2) Never done    Flu Vaccine (1)-today 09/01/2021    Medicare Yearly Exam  08/21/2021      2019 DexA-osteoporosis  Last Prolia over 12 months    Wt Readings from Last 3 Encounters:   09/09/21 89 lb 9.6 oz (40.6 kg)   05/27/21 81 lb 9.6 oz (37 kg)   08/20/20 80 lb 6.4 oz (36.5 kg)   eating more? Does not like Boost     coughing less  Mucinex to break out phlegm  Prevacid for GERD    BP Readings from Last 3 Encounters:   09/09/21 (!) 116/56   05/27/21 (!) 122/54   08/20/20 129/61   will dec Norvasc to 2.5      Cholesterol meds discontinued bec of age     Remeron to inc appetite-needs refill      ROS:    Pt denies: Wt loss, Fever/Chills, HA, Visual changes, Fatigue, Chest pain, SOB, KUMAR, Abd pain, N/V/D/C, Blood in stool or urine, Edema. Pertinent positive as above in HPI.  All others were negative    Patient Active Problem List   Diagnosis Code    Hyperlipidemia E78.5    Essential hypertension, benign I10    Vitamin D deficiency E55.9    Advance directive discussed with patient Z70.80    Moderate single current episode of major depressive disorder ([address] F32.1    Age-related osteoporosis without current pathological fracture M81.0    Patient underweight R63.6    Bronchiectasis with acute exacerbation ([address] J47.1    MYRIAM (mycobacterium avium-intracellulare) infection ([address] A31.0    Gastroesophageal reflux disease K21.9       Past Medical History:   Diagnosis Date    Hypercholesterolemia     high in past / was on meds at that time but now off    MYRIAM (mycobacterium avium-intracellulare) infection ([facility]     treated and released by Pulmonary       Current Outpatient Medications   Medication Sig Dispense Refill    amLODIPine (NORVASC) 2.5 mg tablet Take 1 Tablet by mouth daily. 90 Tablet 1    mirtazapine (REMERON) 45 mg tablet Take 1 Tablet by mouth nightly. 90 Tablet 1    guaifenesin/pseudoephedrne HCl (MUCINEX D PO) Take  by mouth.  lansoprazole (PREVACID) 30 mg capsule Take  by mouth Daily (before breakfast).  CALCIUM PHOSPHATE TRIB/VIT D3 (CITRACAL + D PO) Take  by mouth.  flaxseed oil 1,000 mg Cap Take  by mouth.  red yeast rice extract 600 mg Cap Take 600 mg by mouth now. Social History     Tobacco Use   Smoking Status Never Smoker   Smokeless Tobacco Never Used       No Known Allergies    Patient Labs were reviewed: yes    Patient Past Records were reviewed: yes      Objective:     Vitals:    09/09/21 1250   BP: (!) 116/56   Pulse: 83   Resp: 16   Temp: 98.2 °F (36.8 °C)   TempSrc: Temporal   SpO2: 96%   Weight: 89 lb 9.6 oz (40.6 kg)   Height: 5' 1\" (1.549 m)     Body mass index is 16.93 kg/m². Exam:   Appearance: alert, well appearing,  oriented to person, place, and time, acyanotic, in no respiratory distress and well hydrated. HEENT:  NC/AT, pink conj, anicteric sclerae  Neck:  No cervical lymphadenopathy, no JVD, no thyromegaly, no carotid bruit,   Heart:  RRR without M/R/G  Lungs:  no rhonchi,/rales, or wheezes with good air exchange,crackles on the left base  (chronic)   Abdomen:  Non-tender, pos bowel sounds, no hepatosplenomegaly  Ext:  No C/C/E    Skin: no rash  Neuro: no lateralizing signs, CNs II-XII intact            I have discussed the diagnosis with the patient and the intended plan as seen in the above orders. The patient has received an After-Visit Summary and questions were answered concerning future plans. Medication Side Effects and Warnings were discussed with patient: yes    Patient verbalized understanding of above instructions. Gracia Gipson MD  Internal Medicine  [facility]    This is the Subsequent Medicare Annual Wellness Exam, performed 12 months or more after the Initial AWV or the last Subsequent AWV    I have reviewed the patient's medical history in detail and updated the computerized patient record. Assessment/Plan   Education and counseling provided:  Are appropriate based on today's review and evaluation  End-of-Life planning (with patient's consent)-primary decision maker verified  Pneumococcal Vaccine-done  Influenza Vaccine-today  Screening Mammography-not needed at her age  Cardiovascular screening blood test-lipids up to date  Bone mass measurement (DEXA)-done 2019, osteoporosis  Screening for glaucoma-eye exam is up to date  Diabetes screening test-FBs up to date    1. Medicare annual wellness visit, subsequent-Refer to above for plan and to patient instructions for recommendations on     RTC yearly for wellness visit    Depression Risk Factor Screening     3 most recent PHQ Screens 9/9/2021   Little interest or pleasure in doing things Not at all   Feeling down, depressed, irritable, or hopeless Not at all   Total Score PHQ 2 0       Alcohol Risk Screen    Do you average more than 1 drink per night or more than 7 drinks a week:  No    On any one occasion in the past three months have you have had more than 3 drinks containing alcohol:  No        Functional Ability and Level of Safety    Hearing: Hearing is good. Activities of Daily Living: The home contains: no safety equipment. Patient does total self care      Ambulation: with no difficulty     Fall Risk:  Fall Risk Assessment, last 12 mths 9/9/2021   Able to walk? Yes   Fall in past 12 months? 0   Do you feel unsteady?  0   Are you worried about falling 0      Abuse Screen:  Patient is not abused     Patient lives by herself, son lives nearby-very attentive to her needs    Cognitive Screening    Has your family/caregiver stated any concerns about your memory: no     Cognitive Screening: Normal - not done bec of language barrier but son denied any memory loss    Health Maintenance Due     Health Maintenance Due   Topic Date Due    Shingrix Vaccine Age 49> (1 of 2) Never done    Flu Vaccine (1) 09/01/2021       Patient Care Team   Patient Care Team:  Maryjane Chicas MD as PCP - General (Internal Medicine)  Maryjane Chicas MD as PCP - [facility]  Kisha Eldridge MD (Internal Medicine)  Loyda Lopez MD (Ophthalmology)  Carol Mason MD (Gastroenterology)  Syed Nur MD (Unknown Physician Specialty)    History     Patient Active Problem List   Diagnosis Code    Hyperlipidemia E78.5    Essential hypertension, benign I10    Vitamin D deficiency E55.9    Advance directive discussed with patient Z71.89    Moderate single current episode of major depressive disorder ([facility] F32.1    Age-related osteoporosis without current pathological fracture M81.0    Patient underweight R63.6    Bronchiectasis with acute exacerbation ([address] J47.1    MYRIAM (mycobacterium avium-intracellulare) infection ([address] A31.0    Gastroesophageal reflux disease K21.9     Past Medical History:   Diagnosis Date    Hypercholesterolemia     high in past / was on meds at that time but now off    MYRIAM (mycobacterium avium-intracellulare) infection ([address]     treated and released by Pulmonary      History reviewed. No pertinent surgical history. Current Outpatient Medications   Medication Sig Dispense Refill    amLODIPine (NORVASC) 5 mg tablet Take 1 Tablet by mouth daily. 90 Tablet 1    mirtazapine (REMERON) 45 mg tablet Take 1 Tablet by mouth nightly. 90 Tablet 1    guaifenesin/pseudoephedrne HCl (MUCINEX D PO) Take  by mouth.       lansoprazole (PREVACID) 30 mg capsule Take  by mouth Daily (before breakfast).  CALCIUM PHOSPHATE TRIB/VIT D3 (CITRACAL + D PO) Take  by mouth.  flaxseed oil 1,000 mg Cap Take  by mouth.  red yeast rice extract 600 mg Cap Take 600 mg by mouth now. No Known Allergies    History reviewed. No pertinent family history.   Social History     Tobacco Use    Smoking status: Never Smoker    Smokeless tobacco: Never Used   Substance Use Topics    Alcohol use: No         Noemi Tabares MD

## 2021-09-09 NOTE — PROGRESS NOTES
Patient seen for medicare wellness, would like to restart Prolia if recommended. 1. Have you been to the ER, urgent care clinic since your last visit? Hospitalized since your last visit? No    2. Have you seen or consulted any other health care providers outside of the [address] [place] [address] since your last visit? Include any pap smears or colon screening. No     Health Maintenance Due   Topic Date Due    Shingrix Vaccine Age 49> (1 of 2) Never done         Patient was given VIS for review, consent was obtained and per orders of Dr. Marquis Resendez, [facility] observed. No signs nor symptoms of any adverse reactions. Patient tolerated injection well.

## 2021-12-09 ENCOUNTER — TELEPHONE (OUTPATIENT)
Dept: FAMILY MEDICINE CLINIC | Age: 81
End: 2021-12-09

## 2021-12-09 ENCOUNTER — OFFICE VISIT (OUTPATIENT)
Dept: FAMILY MEDICINE CLINIC | Age: 81
End: 2021-12-09
Payer: MEDICARE

## 2021-12-09 VITALS
WEIGHT: 90.2 LBS | OXYGEN SATURATION: 95 % | TEMPERATURE: 98.3 F | HEART RATE: 78 BPM | DIASTOLIC BLOOD PRESSURE: 64 MMHG | SYSTOLIC BLOOD PRESSURE: 146 MMHG | RESPIRATION RATE: 16 BRPM | HEIGHT: 61 IN | BODY MASS INDEX: 17.03 KG/M2

## 2021-12-09 DIAGNOSIS — I10 ESSENTIAL HYPERTENSION, BENIGN: Primary | ICD-10-CM

## 2021-12-09 DIAGNOSIS — M81.0 AGE-RELATED OSTEOPOROSIS WITHOUT CURRENT PATHOLOGICAL FRACTURE: ICD-10-CM

## 2021-12-09 DIAGNOSIS — M54.9 UPPER BACK PAIN: ICD-10-CM

## 2021-12-09 PROCEDURE — G8753 SYS BP > OR = 140: HCPCS | Performed by: INTERNAL MEDICINE

## 2021-12-09 PROCEDURE — 99214 OFFICE O/P EST MOD 30 MIN: CPT | Performed by: INTERNAL MEDICINE

## 2021-12-09 PROCEDURE — G8536 NO DOC ELDER MAL SCRN: HCPCS | Performed by: INTERNAL MEDICINE

## 2021-12-09 PROCEDURE — G8419 CALC BMI OUT NRM PARAM NOF/U: HCPCS | Performed by: INTERNAL MEDICINE

## 2021-12-09 PROCEDURE — 1101F PT FALLS ASSESS-DOCD LE1/YR: CPT | Performed by: INTERNAL MEDICINE

## 2021-12-09 PROCEDURE — G8427 DOCREV CUR MEDS BY ELIG CLIN: HCPCS | Performed by: INTERNAL MEDICINE

## 2021-12-09 PROCEDURE — G8754 DIAS BP LESS 90: HCPCS | Performed by: INTERNAL MEDICINE

## 2021-12-09 PROCEDURE — G9717 DOC PT DX DEP/BP F/U NT REQ: HCPCS | Performed by: INTERNAL MEDICINE

## 2021-12-09 PROCEDURE — 1090F PRES/ABSN URINE INCON ASSESS: CPT | Performed by: INTERNAL MEDICINE

## 2021-12-09 NOTE — TELEPHONE ENCOUNTER
Marta with [facility] called and wanted to speak to you about the order she received.  Janel Christian can be reached at [phone]

## 2021-12-09 NOTE — PROGRESS NOTES
Assessment/ Plan:   Diagnoses and all orders for this visit:    1. Essential hypertension, benign-uncontrolled today, inc Norvasc dose to 5 mg from 2.5 mg  -     amLODIPine (NORVASC) 5 mg tablet; Take 1 Tablet by mouth daily. 2. Age-related osteoporosis without current pathological fracture-will restart Prolia next week    3. Upper back pain-advised to apply warm compress to the area; prn muscle relaxant  -     tiZANidine (ZANAFLEX) 2 mg tablet; Take 1 Tablet by mouth three (3) times daily as needed (neck pain). Follow-up and Dispositions    · Return in about 3 months (around 3/9/2022) for follow up. Chief Complaint   Patient presents with    Follow Up Chronic Condition    Joint Pain     neck and shoulder       Pt is a 79y.o. year old female who presents for follow up of her chronic medical problems    Health Maintenance Due   Topic Date Due    Shingrix Vaccine Age 49> (1 of 2) Never done        Brought by her son    BP Readings from Last 3 Encounters:   12/09/21 (!) 146/64   09/09/21 (!) 116/56   05/27/21 (!) 122/54   Repeat BP-still elevated    Wt Readings from Last 3 Encounters:   12/09/21 90 lb 3.2 oz (40.9 kg)   09/09/21 89 lb 9.6 oz (40.6 kg)   05/27/21 81 lb 9.6 oz (37 kg)     Soreness on the upper shoulders/back since last week after a bath  No fever, no cough  When she is sitting    Prolia next week  ROS:    Pt denies: Wt loss, Fever/Chills, HA, Visual changes, Fatigue, Chest pain, SOB, KUMAR, Abd pain, N/V/D/C, Blood in stool or urine, Edema. Pertinent positive as above in HPI.  All others were negative    Patient Active Problem List   Diagnosis Code    Hyperlipidemia E78.5    Essential hypertension, benign I10    Vitamin D deficiency E55.9    Advance directive discussed with patient Z70.80    Moderate single current episode of major depressive disorder ([facility] [address] F32.1    Age-related osteoporosis without current pathological fracture M81.0    Patient underweight R63.6    Bronchiectasis with acute exacerbation ([facility] J47.1    MYRIAM (mycobacterium avium-intracellulare) [facility] A31.0    Gastroesophageal reflux disease K21.9    Osteoporosis M81.0       Past Medical History:   Diagnosis Date    Hypercholesterolemia     high in past / was on meds at that time but now off    MYRIAM (mycobacterium avium-intracellulare) infection ([facility]     treated and released by Pulmonary       Current Outpatient Medications   Medication Sig Dispense Refill    amLODIPine (NORVASC) 2.5 mg tablet Take 1 Tablet by mouth daily. 90 Tablet 1    mirtazapine (REMERON) 45 mg tablet Take 1 Tablet by mouth nightly. 90 Tablet 1    guaifenesin/pseudoephedrne HCl (MUCINEX D PO) Take  by mouth.  lansoprazole (PREVACID) 30 mg capsule Take  by mouth Daily (before breakfast).  CALCIUM PHOSPHATE TRIB/VIT D3 (CITRACAL + D PO) Take  by mouth.  flaxseed oil 1,000 mg Cap Take  by mouth.  red yeast rice extract 600 mg Cap Take 600 mg by mouth now. Social History     Tobacco Use   Smoking Status Never Smoker   Smokeless Tobacco Never Used       No Known Allergies    Patient Labs were reviewed: yes    Patient Past Records were reviewed: yes      Objective:     Vitals:    12/09/21 1243   BP: (!) 146/64   Pulse: 78   Resp: 16   Temp: 98.3 °F (36.8 °C)   TempSrc: Temporal   SpO2: 95%   Weight: 90 lb 3.2 oz (40.9 kg)   Height: 5' 1\" (1.549 m)     Body mass index is 17.04 kg/m². Exam:   Appearance: alert, well appearing,  oriented to person, place, and time, acyanotic, in no respiratory distress and well hydrated.   HEENT:  NC/AT, pink conj, anicteric sclerae  Neck:  No cervical lymphadenopathy, no JVD, no thyromegaly, no carotid bruit  Heart:  RRR without M/R/G  Lungs:  CTAB, no rhonchi, rales, or wheezes with good air exchange   Abdomen:  Non-tender, pos bowel sounds, no hepatosplenomegaly  Ext:  No C/C/E    Skin: no rash  Neuro: no lateralizing signs, CNs II-XII intact  Reproducible pain on the upper back/no rash seen, FROM of both shoulders and neck          I have discussed the diagnosis with the patient and the intended plan as seen in the above orders. The patient has received an After-Visit Summary and questions were answered concerning future plans. Medication Side Effects and Warnings were discussed with patient: yes    Patient verbalized understanding of above instructions.     Dotty Juárez MD  Internal Medicine  [facility]

## 2021-12-09 NOTE — PROGRESS NOTES
Patient seen for routine follow up with c/o neck and shoulder pain    1. Have you been to the ER, urgent care clinic since your last visit? Hospitalized since your last visit? No    2. Have you seen or consulted any other health care providers outside of the [address] [place] [address] since your last visit? Include any pap smears or colon screening.  No    Health Maintenance Due   Topic Date Due    Shingrix Vaccine Age 50> (1 of 2) Never done

## 2021-12-14 ENCOUNTER — HOSPITAL ENCOUNTER (OUTPATIENT)
Dept: INFUSION THERAPY | Age: 81
Discharge: HOME | End: 2021-12-14
Payer: MEDICARE

## 2021-12-14 VITALS
OXYGEN SATURATION: 95 % | DIASTOLIC BLOOD PRESSURE: 72 MMHG | HEART RATE: 82 BPM | RESPIRATION RATE: 18 BRPM | TEMPERATURE: 98 F | SYSTOLIC BLOOD PRESSURE: 138 MMHG

## 2021-12-14 DIAGNOSIS — M81.0 OSTEOPOROSIS, UNSPECIFIED OSTEOPOROSIS TYPE, UNSPECIFIED PATHOLOGICAL FRACTURE PRESENCE: Primary | ICD-10-CM

## 2021-12-14 LAB
ANION GAP BLD CALC-SCNC: 11 MMOL/L (ref 10–20)
BUN BLD-MCNC: 12 MG/DL (ref 7–18)
CA-I BLD-MCNC: 1.22 MMOL/L (ref 1.12–1.32)
CHLORIDE BLD-SCNC: 97 MMOL/L (ref 100–108)
CO2 BLD-SCNC: 31 MMOL/L (ref 19–24)
CREAT UR-MCNC: 0.5 MG/DL (ref 0.6–1.3)
GLUCOSE BLD STRIP.AUTO-MCNC: 90 MG/DL (ref 74–106)
MAGNESIUM SERPL-MCNC: 2.2 MG/DL (ref 1.6–2.6)
PHOSPHATE SERPL-MCNC: 3.4 MG/DL (ref 2.5–4.9)
POTASSIUM BLD-SCNC: 4.4 MMOL/L (ref 3.5–5.5)
SODIUM BLD-SCNC: 138 MMOL/L (ref 136–145)

## 2021-12-14 PROCEDURE — 84100 ASSAY OF PHOSPHORUS: CPT

## 2021-12-14 PROCEDURE — 83735 ASSAY OF MAGNESIUM: CPT

## 2021-12-14 PROCEDURE — 36415 COLL VENOUS BLD VENIPUNCTURE: CPT

## 2021-12-14 PROCEDURE — 96372 THER/PROPH/DIAG INJ SC/IM: CPT

## 2021-12-14 PROCEDURE — 80047 BASIC METABLC PNL IONIZED CA: CPT

## 2021-12-14 PROCEDURE — 74011250636 HC RX REV CODE- 250/636: Performed by: INTERNAL MEDICINE

## 2021-12-14 RX ORDER — ACETAMINOPHEN 325 MG/1
650 TABLET ORAL AS NEEDED
Status: CANCELLED
Start: 2021-12-14

## 2021-12-14 RX ORDER — EPINEPHRINE 1 MG/ML
0.3 INJECTION, SOLUTION, CONCENTRATE INTRAVENOUS AS NEEDED
Status: CANCELLED | OUTPATIENT
Start: 2022-06-12

## 2021-12-14 RX ORDER — DIPHENHYDRAMINE HYDROCHLORIDE 50 MG/ML
25 INJECTION, SOLUTION INTRAMUSCULAR; INTRAVENOUS AS NEEDED
Status: CANCELLED
Start: 2021-12-14

## 2021-12-14 RX ORDER — DIPHENHYDRAMINE HYDROCHLORIDE 50 MG/ML
25 INJECTION, SOLUTION INTRAMUSCULAR; INTRAVENOUS AS NEEDED
Status: CANCELLED
Start: 2022-06-12

## 2021-12-14 RX ORDER — DIPHENHYDRAMINE HYDROCHLORIDE 50 MG/ML
50 INJECTION, SOLUTION INTRAMUSCULAR; INTRAVENOUS AS NEEDED
Status: CANCELLED
Start: 2022-06-12

## 2021-12-14 RX ORDER — ACETAMINOPHEN 325 MG/1
650 TABLET ORAL AS NEEDED
Status: CANCELLED
Start: 2022-06-12

## 2021-12-14 RX ORDER — HYDROCORTISONE SODIUM SUCCINATE 100 MG/2ML
100 INJECTION, POWDER, FOR SOLUTION INTRAMUSCULAR; INTRAVENOUS AS NEEDED
Status: CANCELLED | OUTPATIENT
Start: 2022-06-12

## 2021-12-14 RX ORDER — ONDANSETRON 2 MG/ML
8 INJECTION INTRAMUSCULAR; INTRAVENOUS AS NEEDED
Status: CANCELLED | OUTPATIENT
Start: 2022-06-12

## 2021-12-14 RX ORDER — ALBUTEROL SULFATE 0.83 MG/ML
2.5 SOLUTION RESPIRATORY (INHALATION) AS NEEDED
Status: CANCELLED
Start: 2021-12-14

## 2021-12-14 RX ORDER — EPINEPHRINE 1 MG/ML
0.3 INJECTION, SOLUTION, CONCENTRATE INTRAVENOUS AS NEEDED
Status: CANCELLED | OUTPATIENT
Start: 2021-12-14

## 2021-12-14 RX ORDER — HYDROCORTISONE SODIUM SUCCINATE 100 MG/2ML
100 INJECTION, POWDER, FOR SOLUTION INTRAMUSCULAR; INTRAVENOUS AS NEEDED
Status: CANCELLED | OUTPATIENT
Start: 2021-12-14

## 2021-12-14 RX ORDER — ALBUTEROL SULFATE 0.83 MG/ML
2.5 SOLUTION RESPIRATORY (INHALATION) AS NEEDED
Status: CANCELLED
Start: 2022-06-12

## 2021-12-14 RX ORDER — DIPHENHYDRAMINE HYDROCHLORIDE 50 MG/ML
50 INJECTION, SOLUTION INTRAMUSCULAR; INTRAVENOUS AS NEEDED
Status: CANCELLED
Start: 2021-12-14

## 2021-12-14 RX ORDER — ONDANSETRON 2 MG/ML
8 INJECTION INTRAMUSCULAR; INTRAVENOUS AS NEEDED
Status: CANCELLED | OUTPATIENT
Start: 2021-12-14

## 2021-12-14 RX ADMIN — DENOSUMAB 60 MG: 60 INJECTION SUBCUTANEOUS at 08:54

## 2021-12-14 NOTE — PROGRESS NOTES
[facility] Progress Note    Date: 2021    Name: Anand Henderson    MRN: [id number]         : 1940    Prolia    Ms. Bender was assessed and education was provided. Ms. Brenden Cornejo vitals were reviewed and patient was observed for 5 minutes prior to treatment. Visit Vitals  /72 (BP 1 Location: Left upper arm, BP Patient Position: Sitting)   Pulse 82   Temp 98 °F (36.7 °C)   Resp 18   SpO2 95%         Lab results were obtained and reviewed. Recent Results (from the past 12 hour(s))   POC CHEM8    Collection Time: 21  8:41 AM   Result Value Ref Range    CO2, POC 31 (H) 19 - 24 MMOL/L    Glucose, POC 90 74 - 106 MG/DL    BUN, POC 12 7 - 18 MG/DL    Creatinine, POC 0.5 (L) 0.6 - 1.3 MG/DL    GFRAA, POC >60 >60 ml/min/1.73m2    GFRNA, POC >60 >60 ml/min/1.73m2    Sodium,  136 - 145 MMOL/L    Potassium, POC 4.4 3.5 - 5.5 MMOL/L    Calcium, ionized (POC) 1.22 1.12 - 1.32 mmol/L    Chloride, POC 97 (L) 100 - 108 MMOL/L    Anion gap, POC 11 10 - 20       Peripheral lab draw to Left AC x1 attempt, by phlebotomnatividad Delgado. Gauze and coban applied to site. Ionized calcium WNL 1.22 okay to receive injection today. Prolia 60 mg was administered subcutaneous in  Left upper arm. Band aid placed at site. Ms. Reynaldo Oreilly tolerated well, and had no complaints. Patient armband removed and shredded. Ms. Reynaldo Oreilly was discharged from [place] [address] in stable condition at [phone]. She is to return on 22 at 0830 for her next appointment.     Jeana Zarate RN  2021  8:57 AM

## 2021-12-17 RX ORDER — AMLODIPINE BESYLATE 5 MG/1
5 TABLET ORAL DAILY
Qty: 90 TABLET | Refills: 1 | Status: SHIPPED | OUTPATIENT
Start: 2021-12-17 | End: 2022-07-01 | Stop reason: SDUPTHER

## 2021-12-17 RX ORDER — TIZANIDINE 2 MG/1
2 TABLET ORAL
Qty: 30 TABLET | Refills: 0 | Status: SHIPPED | OUTPATIENT
Start: 2021-12-17 | End: 2022-06-13 | Stop reason: SDUPTHER

## 2021-12-17 NOTE — PATIENT INSTRUCTIONS
DASH Diet: Care Instructions  Your Care Instructions     The DASH diet is an eating plan that can help lower your blood pressure. DASH stands for Dietary Approaches to Stop Hypertension. Hypertension is high blood pressure. The DASH diet focuses on eating foods that are high in calcium, potassium, and magnesium. These nutrients can lower blood pressure. The foods that are highest in these nutrients are fruits, vegetables, low-fat dairy products, nuts, seeds, and legumes. But taking calcium, potassium, and magnesium supplements instead of eating foods that are high in those nutrients does not have the same effect. The DASH diet also includes whole grains, fish, and poultry. The DASH diet is one of several lifestyle changes your doctor may recommend to lower your high blood pressure. Your doctor may also want you to decrease the amount of sodium in your diet. Lowering sodium while following the DASH diet can lower blood pressure even further than just the DASH diet alone. Follow-up care is a key part of your treatment and safety. Be sure to make and go to all appointments, and call your doctor if you are having problems. It's also a good idea to know your test results and keep a list of the medicines you take. How can you care for yourself at home? Following the DASH diet  · Eat 4 to 5 servings of fruit each day. A serving is 1 medium-sized piece of fruit, ½ cup chopped or canned fruit, 1/4 cup dried fruit, or 4 ounces (½ cup) of fruit juice. Choose fruit more often than fruit juice. · Eat 4 to 5 servings of vegetables each day. A serving is 1 cup of lettuce or raw leafy vegetables, ½ cup of chopped or cooked vegetables, or 4 ounces (½ cup) of vegetable juice. Choose vegetables more often than vegetable juice. · Get 2 to 3 servings of low-fat and fat-free dairy each day. A serving is 8 ounces of milk, 1 cup of yogurt, or 1 ½ ounces of cheese. · Eat 6 to 8 servings of grains each day.  A serving is 1 slice of bread, 1 ounce of dry cereal, or ½ cup of cooked rice, pasta, or cooked cereal. Try to choose whole-grain products as much as possible. · Limit lean meat, poultry, and fish to 2 servings each day. A serving is 3 ounces, about the size of a deck of cards. · Eat 4 to 5 servings of nuts, seeds, and legumes (cooked dried beans, lentils, and split peas) each week. A serving is 1/3 cup of nuts, 2 tablespoons of seeds, or ½ cup of cooked beans or peas. · Limit fats and oils to 2 to 3 servings each day. A serving is 1 teaspoon of vegetable oil or 2 tablespoons of salad dressing. · Limit sweets and added sugars to 5 servings or less a week. A serving is 1 tablespoon jelly or jam, ½ cup sorbet, or 1 cup of lemonade. · Eat less than 2,300 milligrams (mg) of sodium a day. If you limit your sodium to 1,500 mg a day, you can lower your blood pressure even more. · Be aware that all of these are the suggested number of servings for people who eat 1,800 to 2,000 calories a day. Your recommended number of servings may be different if you need more or fewer calories. Tips for success  · Start small. Do not try to make dramatic changes to your diet all at once. You might feel that you are missing out on your favorite foods and then be more likely to not follow the plan. Make small changes, and stick with them. Once those changes become habit, add a few more changes. · Try some of the following:  ? Make it a goal to eat a fruit or vegetable at every meal and at snacks. This will make it easy to get the recommended amount of fruits and vegetables each day. ? Try yogurt topped with fruit and nuts for a snack or healthy dessert. ? Add lettuce, tomato, cucumber, and onion to sandwiches. ? Combine a ready-made pizza crust with low-fat mozzarella cheese and lots of vegetable toppings. Try using tomatoes, squash, spinach, broccoli, carrots, cauliflower, and onions. ?  Have a variety of cut-up vegetables with a low-fat dip as an appetizer instead of chips and dip. ? Sprinkle sunflower seeds or chopped almonds over salads. Or try adding chopped walnuts or almonds to cooked vegetables. ? Try some vegetarian meals using beans and peas. Add garbanzo or kidney beans to salads. Make burritos and tacos with mashed youngblood beans or black beans. Where can you learn more? Go to http://www.reeves.com/  Enter H967 in the search box to learn more about \"DASH Diet: Care Instructions. \"  Current as of: April 29, 2021               Content Version: 13.0  © 3392-3281 [employer]. Care instructions adapted under license by [employer] (which disclaims liability or warranty for this information). If you have questions about a medical condition or this instruction, always ask your healthcare professional. [address] any warranty or liability for your use of this information.

## 2022-03-09 ENCOUNTER — OFFICE VISIT (OUTPATIENT)
Dept: FAMILY MEDICINE CLINIC | Age: 82
End: 2022-03-09
Payer: MEDICARE

## 2022-03-09 ENCOUNTER — HOSPITAL ENCOUNTER (OUTPATIENT)
Dept: LAB | Age: 82
Discharge: HOME | End: 2022-03-09
Payer: MEDICARE

## 2022-03-09 VITALS
WEIGHT: 89.4 LBS | HEART RATE: 67 BPM | BODY MASS INDEX: 16.88 KG/M2 | DIASTOLIC BLOOD PRESSURE: 85 MMHG | OXYGEN SATURATION: 93 % | SYSTOLIC BLOOD PRESSURE: 136 MMHG | RESPIRATION RATE: 16 BRPM | TEMPERATURE: 98.3 F | HEIGHT: 61 IN

## 2022-03-09 DIAGNOSIS — J47.9 BRONCHIECTASIS WITHOUT COMPLICATION (HCC): ICD-10-CM

## 2022-03-09 DIAGNOSIS — F32.1 MODERATE SINGLE CURRENT EPISODE OF MAJOR DEPRESSIVE DISORDER (HCC): ICD-10-CM

## 2022-03-09 DIAGNOSIS — R06.09 DOE (DYSPNEA ON EXERTION): ICD-10-CM

## 2022-03-09 DIAGNOSIS — I10 ESSENTIAL HYPERTENSION, BENIGN: Primary | ICD-10-CM

## 2022-03-09 DIAGNOSIS — R42 DIZZINESS: ICD-10-CM

## 2022-03-09 DIAGNOSIS — A31.0 PULMONARY MYCOBACTERIUM AVIUM COMPLEX (MAC) INFECTION (HCC): ICD-10-CM

## 2022-03-09 DIAGNOSIS — R63.4 WEIGHT LOSS: ICD-10-CM

## 2022-03-09 DIAGNOSIS — E78.5 HYPERLIPIDEMIA, UNSPECIFIED HYPERLIPIDEMIA TYPE: ICD-10-CM

## 2022-03-09 DIAGNOSIS — I10 ESSENTIAL HYPERTENSION, BENIGN: ICD-10-CM

## 2022-03-09 DIAGNOSIS — M81.0 AGE-RELATED OSTEOPOROSIS WITHOUT CURRENT PATHOLOGICAL FRACTURE: ICD-10-CM

## 2022-03-09 LAB
ALBUMIN SERPL-MCNC: 3.9 G/DL (ref 3.4–5)
ALBUMIN/GLOB SERPL: 0.9 {RATIO} (ref 0.8–1.7)
ALP SERPL-CCNC: 56 U/L (ref 45–117)
ALT SERPL-CCNC: 20 U/L (ref 13–56)
ANION GAP SERPL CALC-SCNC: 7 MMOL/L (ref 3–18)
AST SERPL-CCNC: 20 U/L (ref 10–38)
BASOPHILS # BLD: 0 K/UL (ref 0–0.1)
BASOPHILS NFR BLD: 1 % (ref 0–2)
BILIRUB SERPL-MCNC: 0.4 MG/DL (ref 0.2–1)
BUN SERPL-MCNC: 7 MG/DL (ref 7–18)
BUN/CREAT SERPL: 12 (ref 12–20)
CALCIUM SERPL-MCNC: 9.5 MG/DL (ref 8.5–10.1)
CHLORIDE SERPL-SCNC: 97 MMOL/L (ref 100–111)
CHOLEST SERPL-MCNC: 243 MG/DL
CO2 SERPL-SCNC: 31 MMOL/L (ref 21–32)
CREAT SERPL-MCNC: 0.6 MG/DL (ref 0.6–1.3)
DIFFERENTIAL METHOD BLD: ABNORMAL
EOSINOPHIL # BLD: 0.1 K/UL (ref 0–0.4)
EOSINOPHIL NFR BLD: 2 % (ref 0–5)
ERYTHROCYTE [DISTWIDTH] IN BLOOD BY AUTOMATED COUNT: 12.7 % (ref 11.6–14.5)
GLOBULIN SER CALC-MCNC: 4.5 G/DL (ref 2–4)
GLUCOSE SERPL-MCNC: 85 MG/DL (ref 74–99)
HCT VFR BLD AUTO: 45.4 % (ref 35–45)
HDLC SERPL-MCNC: 103 MG/DL (ref 40–60)
HDLC SERPL: 2.4 {RATIO} (ref 0–5)
HGB BLD-MCNC: 14.7 G/DL (ref 12–16)
IMM GRANULOCYTES # BLD AUTO: 0 K/UL (ref 0–0.04)
IMM GRANULOCYTES NFR BLD AUTO: 0 % (ref 0–0.5)
LDLC SERPL CALC-MCNC: 128.2 MG/DL (ref 0–100)
LIPID PROFILE,FLP: ABNORMAL
LYMPHOCYTES # BLD: 1.7 K/UL (ref 0.9–3.6)
LYMPHOCYTES NFR BLD: 27 % (ref 21–52)
MCH RBC QN AUTO: 30.9 PG (ref 24–34)
MCHC RBC AUTO-ENTMCNC: 32.4 G/DL (ref 31–37)
MCV RBC AUTO: 95.4 FL (ref 78–100)
MONOCYTES # BLD: 0.5 K/UL (ref 0.05–1.2)
MONOCYTES NFR BLD: 7 % (ref 3–10)
NEUTS SEG # BLD: 4 K/UL (ref 1.8–8)
NEUTS SEG NFR BLD: 63 % (ref 40–73)
NRBC # BLD: 0 K/UL (ref 0–0.01)
NRBC BLD-RTO: 0 PER 100 WBC
PLATELET # BLD AUTO: 230 K/UL (ref 135–420)
PMV BLD AUTO: 10.3 FL (ref 9.2–11.8)
POTASSIUM SERPL-SCNC: 4.1 MMOL/L (ref 3.5–5.5)
PROT SERPL-MCNC: 8.4 G/DL (ref 6.4–8.2)
RBC # BLD AUTO: 4.76 M/UL (ref 4.2–5.3)
SODIUM SERPL-SCNC: 135 MMOL/L (ref 136–145)
TRIGL SERPL-MCNC: 59 MG/DL (ref ?–150)
TSH SERPL DL<=0.05 MIU/L-ACNC: 1.85 UIU/ML (ref 0.36–3.74)
VLDLC SERPL CALC-MCNC: 11.8 MG/DL
WBC # BLD AUTO: 6.4 K/UL (ref 4.6–13.2)

## 2022-03-09 PROCEDURE — G8419 CALC BMI OUT NRM PARAM NOF/U: HCPCS | Performed by: INTERNAL MEDICINE

## 2022-03-09 PROCEDURE — 84443 ASSAY THYROID STIM HORMONE: CPT

## 2022-03-09 PROCEDURE — G9717 DOC PT DX DEP/BP F/U NT REQ: HCPCS | Performed by: INTERNAL MEDICINE

## 2022-03-09 PROCEDURE — G8752 SYS BP LESS 140: HCPCS | Performed by: INTERNAL MEDICINE

## 2022-03-09 PROCEDURE — G8536 NO DOC ELDER MAL SCRN: HCPCS | Performed by: INTERNAL MEDICINE

## 2022-03-09 PROCEDURE — 85025 COMPLETE CBC W/AUTO DIFF WBC: CPT

## 2022-03-09 PROCEDURE — 36415 COLL VENOUS BLD VENIPUNCTURE: CPT

## 2022-03-09 PROCEDURE — 80053 COMPREHEN METABOLIC PANEL: CPT

## 2022-03-09 PROCEDURE — G8754 DIAS BP LESS 90: HCPCS | Performed by: INTERNAL MEDICINE

## 2022-03-09 PROCEDURE — 93010 ELECTROCARDIOGRAM REPORT: CPT | Performed by: INTERNAL MEDICINE

## 2022-03-09 PROCEDURE — 1101F PT FALLS ASSESS-DOCD LE1/YR: CPT | Performed by: INTERNAL MEDICINE

## 2022-03-09 PROCEDURE — 80061 LIPID PANEL: CPT

## 2022-03-09 PROCEDURE — 1090F PRES/ABSN URINE INCON ASSESS: CPT | Performed by: INTERNAL MEDICINE

## 2022-03-09 PROCEDURE — 99214 OFFICE O/P EST MOD 30 MIN: CPT | Performed by: INTERNAL MEDICINE

## 2022-03-09 PROCEDURE — G8427 DOCREV CUR MEDS BY ELIG CLIN: HCPCS | Performed by: INTERNAL MEDICINE

## 2022-03-09 RX ORDER — SIMVASTATIN 10 MG/1
10 TABLET, FILM COATED ORAL
Qty: 90 TABLET | Refills: 1 | Status: SHIPPED | OUTPATIENT
Start: 2022-03-09 | End: 2022-10-04 | Stop reason: SDUPTHER

## 2022-03-09 NOTE — PROGRESS NOTES
Patient seen for routine follow up c/o dizzy spells after dinner for one month. Denies headaches or blurred vision    1. \"Have you been to the ER, urgent care clinic since your last visit? Hospitalized since your last visit? \" No    2. \"Have you seen or consulted any other health care providers outside of the [address] [place] [address] since your last visit? \" No     3. For patients aged 39-70: Has the patient had a colonoscopy / FIT/ Cologuard? NA - based on age      If the patient is female:    4. For patients aged 41-77: Has the patient had a mammogram within the past 2 years? NA - based on age or sex      11. For patients aged 21-65: Has the patient had a pap smear?  NA - based on age or sex      Health Maintenance Due   Topic Date Due    Shingrix Vaccine Age 50> (1 of 2) Never done

## 2022-03-09 NOTE — PATIENT INSTRUCTIONS
Dizziness: Care Instructions  Your Care Instructions  Dizziness is the feeling of unsteadiness or fuzziness in your head. It is different than having vertigo, which is a feeling that the room is spinning or that you are moving or falling. It is also different from lightheadedness, which is the feeling that you are about to faint. It can be hard to know what causes dizziness. Some people feel dizzy when they have migraine headaches. Sometimes bouts of flu can make you feel dizzy. Some medical conditions, such as heart problems or high blood pressure, can make you feel dizzy. Many medicines can cause dizziness, including medicines for high blood pressure, pain, or anxiety. If a medicine causes your symptoms, your doctor may recommend that you stop or change the medicine. If it is a problem with your heart, you may need medicine to help your heart work better. If there is no clear reason for your symptoms, your doctor may suggest watching and waiting for a while to see if the dizziness goes away on its own. Follow-up care is a key part of your treatment and safety. Be sure to make and go to all appointments, and call your doctor if you are having problems. It's also a good idea to know your test results and keep a list of the medicines you take. How can you care for yourself at home? · If your doctor recommends or prescribes medicine, take it exactly as directed. Call your doctor if you think you are having a problem with your medicine. · Do not drive while you feel dizzy. · Try to prevent falls. Steps you can take include:  ? Using nonskid mats, adding grab bars near the tub, and using night-lights. ? Clearing your home so that walkways are free of anything you might trip on.  ? Letting family and friends know that you have been feeling dizzy. This will help them know how to help you. When should you call for help? Call 911 anytime you think you may need emergency care.  For example, call if:    · You passed out (lost consciousness).     · You have dizziness along with symptoms of a heart attack. These may include:  ? Chest pain or pressure, or a strange feeling in the chest.  ? Sweating. ? Shortness of breath. ? Nausea or vomiting. ? Pain, pressure, or a strange feeling in the back, neck, jaw, or upper belly or in one or both shoulders or arms. ? Lightheadedness or sudden weakness. ? A fast or irregular heartbeat.     · You have symptoms of a stroke. These may include:  ? Sudden numbness, tingling, weakness, or loss of movement in your face, arm, or leg, especially on only one side of your body. ? Sudden vision changes. ? Sudden trouble speaking. ? Sudden confusion or trouble understanding simple statements. ? Sudden problems with walking or balance. ? A sudden, severe headache that is different from past headaches. Call your doctor now or seek immediate medical care if:    · You feel dizzy and have a fever, headache, or ringing in your ears.     · You have new or increased nausea and vomiting.     · Your dizziness does not go away or comes back. Watch closely for changes in your health, and be sure to contact your doctor if:    · You do not get better as expected. Where can you learn more? Go to http://www.gray.com/  Enter Q823 in the search box to learn more about \"Dizziness: Care Instructions. \"  Current as of: July 1, 2021               Content Version: 13.2  © 2057-4522 [employer]. Care instructions adapted under license by [employer] (which disclaims liability or warranty for this information). If you have questions about a medical condition or this instruction, always ask your healthcare professional. [place] [address] any warranty or liability for your use of this information.

## 2022-03-09 NOTE — PROGRESS NOTES
Assessment/ Plan:   Diagnoses and all orders for this visit:    1. Essential hypertension, benign-controlled, continue with present meds  -     AMB POC EKG ROUTINE W/ 12 LEADS, INTER & REP  -     CBC WITH AUTOMATED DIFF; Future  -     METABOLIC PANEL, COMPREHENSIVE; Future    2. Dizziness-if labs normal and dizziness persists, will order echo  -     AMB POC EKG ROUTINE W/ 12 LEADS, INTER & REP  -     TSH 3RD GENERATION; Future    3. KUMAR (dyspnea on exertion)-as above  -     AMB POC EKG ROUTINE W/ 12 LEADS, INTER & REP  -     TSH 3RD GENERATION; Future    4. Age-related osteoporosis without current pathological fracture-on Prolia, continue with daily Ca+D    5. Hyperlipidemia, unspecified hyperlipidemia type-restart Zocor at a lower dose  -     simvastatin (ZOCOR) 10 mg tablet; Take 1 Tablet by mouth nightly. -     LIPID PANEL; Future    6. Moderate single current episode of major depressive disorder (HCC)-continue with Remeron    7. Bronchiectasis without complication (HCC)-continue with symptomatic relief of cough/managing secretions    8. Pulmonary Mycobacterium avium complex (MAC) infection ([facility] [address] -treated by ID/Pulmo    9. Weight loss-continue with Ensure supplement to meals        Follow-up and Dispositions    · Return in about 3 months (around 6/9/2022) for follow up.                  Chief Complaint   Patient presents with    Follow Up Chronic Condition    Dizziness     1 month       Pt is a 79y.o. year old female who presents for follow up of her chronic medical problems    Health Maintenance Due   Topic Date Due    Shingrix Vaccine Age 50> (1 of 2) Never done      Wt Readings from Last 3 Encounters:   03/09/22 89 lb 6.4 oz (40.6 kg)   12/09/21 90 lb 3.2 oz (40.9 kg)   09/09/21 89 lb 9.6 oz (40.6 kg)       BP Readings from Last 3 Encounters:   03/09/22 136/85   12/14/21 138/72   12/09/21 (!) 146/64   3-4 weeks of dizziness but not vertigo per son  Worse at dinner  On the front part of her head    Does not check BP at home    Lab Results   Component Value Date/Time    Cholesterol, total 243 (H) 03/09/2022 11:30 AM    HDL Cholesterol 103 (H) 03/09/2022 11:30 AM    LDL, calculated 128.2 (H) 03/09/2022 11:30 AM    VLDL, calculated 11.8 03/09/2022 11:30 AM    Triglyceride 59 03/09/2022 11:30 AM    CHOL/HDL Ratio 2.4 03/09/2022 11:30 AM    currently off cholesterol med-thinks this is why she is having dizziness    Prolia-got it last time at [facility] in 2020  Still coughs; clears lungs and ok  Has a tube at home twice a day that she blows into  Mucinex daily    Prevacid daily    KUMAR, mild exertion    ROS:    Pt denies: Wt loss, Fever/Chills, HA, Visual changes, Fatigue, Chest pain, SOB, KUMAR, Abd pain, N/V/D/C, Blood in stool or urine, Edema. Pertinent positive as above in HPI. All others were negative    Patient Active Problem List   Diagnosis Code    Hyperlipidemia E78.5    Essential hypertension, benign I10    Vitamin D deficiency E55.9    Advance directive discussed with patient Z70.80    Moderate single current episode of major depressive disorder ([facility] [address] F32.1    Age-related osteoporosis without current pathological fracture M81.0    Patient underweight R63.6    Bronchiectasis with acute exacerbation ([facility] [address] J47.1    Pulmonary Mycobacterium avium complex (MAC) infection ([facility] [address] A31.0    Gastroesophageal reflux disease K21.9    Osteoporosis M81.0       Past Medical History:   Diagnosis Date    Hypercholesterolemia     high in past / was on meds at that time but now off    MYRIAM (mycobacterium avium-intracellulare) infection ([facility] [address]     treated and released by Pulmonary       Current Outpatient Medications   Medication Sig Dispense Refill    amLODIPine (NORVASC) 5 mg tablet Take 1 Tablet by mouth daily. 90 Tablet 1    tiZANidine (ZANAFLEX) 2 mg tablet Take 1 Tablet by mouth three (3) times daily as needed (neck pain). 30 Tablet 0    mirtazapine (REMERON) 45 mg tablet Take 1 Tablet by mouth nightly. 90 Tablet 1    guaifenesin/pseudoephedrne HCl (MUCINEX D PO) Take  by mouth.  lansoprazole (PREVACID) 30 mg capsule Take  by mouth Daily (before breakfast).  CALCIUM PHOSPHATE TRIB/VIT D3 (CITRACAL + D PO) Take  by mouth.  flaxseed oil 1,000 mg Cap Take  by mouth.  red yeast rice extract 600 mg Cap Take 600 mg by mouth now. Social History     Tobacco Use   Smoking Status Never Smoker   Smokeless Tobacco Never Used       No Known Allergies    Patient Labs were reviewed: yes    Patient Past Records were reviewed: yes      Objective:     Vitals:    03/09/22 0956 03/09/22 1034   BP: (!) 152/65 136/85   Pulse: 84 67   Resp: 16    Temp: 98.3 °F (36.8 °C)    TempSrc: Temporal    SpO2: 93%    Weight: 89 lb 6.4 oz (40.6 kg)    Height: 5' 1\" (1.549 m)      Body mass index is 16.89 kg/m². Exam:   Appearance: alert, well appearing,  oriented to person, place, and time, acyanotic, in no respiratory distress and well hydrated. HEENT:  NC/AT, pink conj, anicteric sclerae  Neck:  No cervical lymphadenopathy, no JVD, no thyromegaly, no carotid bruit  Heart:  RRR without M/R/G  Lungs:  Crackles on the left mid lung, no wheezes   Abdomen:  Non-tender, pos bowel sounds, no hepatosplenomegaly  Ext:  No C/C/E    Skin: no rash  Neuro: no lateralizing signs, CNs II-XII intact    EKG showed RBBB/pulmo disease pattern-similar from 2013        I have discussed the diagnosis with the patient and the intended plan as seen in the above orders. The patient has received an After-Visit Summary and questions were answered concerning future plans. Medication Side Effects and Warnings were discussed with patient: yes    Patient verbalized understanding of above instructions.     Janet Kumar MD  Internal Medicine  [facility]

## 2022-03-18 PROBLEM — K21.9 GASTROESOPHAGEAL REFLUX DISEASE: Status: ACTIVE | Noted: 2019-03-01

## 2022-03-19 PROBLEM — M81.0 OSTEOPOROSIS: Status: ACTIVE | Noted: 2021-12-14

## 2022-03-19 PROBLEM — A31.0 PULMONARY MYCOBACTERIUM AVIUM COMPLEX (MAC) INFECTION (HCC): Status: ACTIVE | Noted: 2018-08-19

## 2022-03-19 PROBLEM — R63.6 PATIENT UNDERWEIGHT: Status: ACTIVE | Noted: 2017-11-01

## 2022-03-19 PROBLEM — J47.1 BRONCHIECTASIS WITH ACUTE EXACERBATION (HCC): Status: ACTIVE | Noted: 2018-06-11

## 2022-03-19 PROBLEM — M81.0 AGE-RELATED OSTEOPOROSIS WITHOUT CURRENT PATHOLOGICAL FRACTURE: Status: ACTIVE | Noted: 2017-04-04

## 2022-05-12 DIAGNOSIS — F32.1 MODERATE SINGLE CURRENT EPISODE OF MAJOR DEPRESSIVE DISORDER (HCC): ICD-10-CM

## 2022-05-12 RX ORDER — MIRTAZAPINE 45 MG/1
45 TABLET, FILM COATED ORAL
Qty: 90 TABLET | Refills: 0 | Status: SHIPPED | OUTPATIENT
Start: 2022-05-12 | End: 2022-08-01 | Stop reason: SDUPTHER

## 2022-06-13 ENCOUNTER — HOSPITAL ENCOUNTER (OUTPATIENT)
Dept: LAB | Age: 82
Discharge: HOME | End: 2022-06-13
Payer: MEDICARE

## 2022-06-13 ENCOUNTER — OFFICE VISIT (OUTPATIENT)
Dept: FAMILY MEDICINE CLINIC | Age: 82
End: 2022-06-13
Payer: MEDICARE

## 2022-06-13 VITALS
RESPIRATION RATE: 16 BRPM | WEIGHT: 85.4 LBS | TEMPERATURE: 98.2 F | OXYGEN SATURATION: 93 % | HEIGHT: 61 IN | HEART RATE: 76 BPM | DIASTOLIC BLOOD PRESSURE: 60 MMHG | SYSTOLIC BLOOD PRESSURE: 130 MMHG | BODY MASS INDEX: 16.12 KG/M2

## 2022-06-13 DIAGNOSIS — F32.1 MODERATE SINGLE CURRENT EPISODE OF MAJOR DEPRESSIVE DISORDER (HCC): ICD-10-CM

## 2022-06-13 DIAGNOSIS — J47.9 BRONCHIECTASIS WITHOUT COMPLICATION (HCC): ICD-10-CM

## 2022-06-13 DIAGNOSIS — M81.0 AGE-RELATED OSTEOPOROSIS WITHOUT CURRENT PATHOLOGICAL FRACTURE: ICD-10-CM

## 2022-06-13 DIAGNOSIS — I10 ESSENTIAL HYPERTENSION: Primary | ICD-10-CM

## 2022-06-13 DIAGNOSIS — E78.5 HYPERLIPIDEMIA, UNSPECIFIED HYPERLIPIDEMIA TYPE: ICD-10-CM

## 2022-06-13 DIAGNOSIS — M79.10 MYALGIA: ICD-10-CM

## 2022-06-13 DIAGNOSIS — R20.2 NUMBNESS AND TINGLING OF LEFT LEG: ICD-10-CM

## 2022-06-13 DIAGNOSIS — I10 ESSENTIAL HYPERTENSION: ICD-10-CM

## 2022-06-13 DIAGNOSIS — R01.1 MURMUR, CARDIAC: ICD-10-CM

## 2022-06-13 DIAGNOSIS — R20.0 NUMBNESS AND TINGLING OF LEFT LEG: ICD-10-CM

## 2022-06-13 LAB
ALBUMIN SERPL-MCNC: 4.1 G/DL (ref 3.4–5)
ALBUMIN/GLOB SERPL: 1 {RATIO} (ref 0.8–1.7)
ALP SERPL-CCNC: 50 U/L (ref 45–117)
ALT SERPL-CCNC: 22 U/L (ref 13–56)
ANION GAP SERPL CALC-SCNC: 6 MMOL/L (ref 3–18)
AST SERPL-CCNC: 23 U/L (ref 10–38)
BASOPHILS # BLD: 0 K/UL (ref 0–0.1)
BASOPHILS NFR BLD: 0 % (ref 0–2)
BILIRUB SERPL-MCNC: 0.4 MG/DL (ref 0.2–1)
BUN SERPL-MCNC: 14 MG/DL (ref 7–18)
BUN/CREAT SERPL: 25 (ref 12–20)
CALCIUM SERPL-MCNC: 9.3 MG/DL (ref 8.5–10.1)
CHLORIDE SERPL-SCNC: 97 MMOL/L (ref 100–111)
CHOLEST SERPL-MCNC: 249 MG/DL
CK SERPL-CCNC: 49 U/L (ref 26–192)
CO2 SERPL-SCNC: 31 MMOL/L (ref 21–32)
CREAT SERPL-MCNC: 0.55 MG/DL (ref 0.6–1.3)
DIFFERENTIAL METHOD BLD: NORMAL
EOSINOPHIL # BLD: 0.1 K/UL (ref 0–0.4)
EOSINOPHIL NFR BLD: 2 % (ref 0–5)
ERYTHROCYTE [DISTWIDTH] IN BLOOD BY AUTOMATED COUNT: 13.3 % (ref 11.6–14.5)
GLOBULIN SER CALC-MCNC: 4.3 G/DL (ref 2–4)
GLUCOSE SERPL-MCNC: 88 MG/DL (ref 74–99)
HCT VFR BLD AUTO: 43.9 % (ref 35–45)
HDLC SERPL-MCNC: 109 MG/DL (ref 40–60)
HDLC SERPL: 2.3 {RATIO} (ref 0–5)
HGB BLD-MCNC: 14.1 G/DL (ref 12–16)
IMM GRANULOCYTES # BLD AUTO: 0 K/UL (ref 0–0.04)
IMM GRANULOCYTES NFR BLD AUTO: 0 % (ref 0–0.5)
LDLC SERPL CALC-MCNC: 126.2 MG/DL (ref 0–100)
LIPID PROFILE,FLP: ABNORMAL
LYMPHOCYTES # BLD: 2.5 K/UL (ref 0.9–3.6)
LYMPHOCYTES NFR BLD: 35 % (ref 21–52)
MCH RBC QN AUTO: 31.1 PG (ref 24–34)
MCHC RBC AUTO-ENTMCNC: 32.1 G/DL (ref 31–37)
MCV RBC AUTO: 96.7 FL (ref 78–100)
MONOCYTES # BLD: 0.6 K/UL (ref 0.05–1.2)
MONOCYTES NFR BLD: 8 % (ref 3–10)
NEUTS SEG # BLD: 3.9 K/UL (ref 1.8–8)
NEUTS SEG NFR BLD: 55 % (ref 40–73)
NRBC # BLD: 0 K/UL (ref 0–0.01)
NRBC BLD-RTO: 0 PER 100 WBC
PLATELET # BLD AUTO: 237 K/UL (ref 135–420)
PMV BLD AUTO: 9.8 FL (ref 9.2–11.8)
POTASSIUM SERPL-SCNC: 4.2 MMOL/L (ref 3.5–5.5)
PROT SERPL-MCNC: 8.4 G/DL (ref 6.4–8.2)
RBC # BLD AUTO: 4.54 M/UL (ref 4.2–5.3)
SODIUM SERPL-SCNC: 134 MMOL/L (ref 136–145)
TRIGL SERPL-MCNC: 69 MG/DL (ref ?–150)
VLDLC SERPL CALC-MCNC: 13.8 MG/DL
WBC # BLD AUTO: 7.2 K/UL (ref 4.6–13.2)

## 2022-06-13 PROCEDURE — 80061 LIPID PANEL: CPT

## 2022-06-13 PROCEDURE — 1123F ACP DISCUSS/DSCN MKR DOCD: CPT | Performed by: INTERNAL MEDICINE

## 2022-06-13 PROCEDURE — 1101F PT FALLS ASSESS-DOCD LE1/YR: CPT | Performed by: INTERNAL MEDICINE

## 2022-06-13 PROCEDURE — G9717 DOC PT DX DEP/BP F/U NT REQ: HCPCS | Performed by: INTERNAL MEDICINE

## 2022-06-13 PROCEDURE — G8754 DIAS BP LESS 90: HCPCS | Performed by: INTERNAL MEDICINE

## 2022-06-13 PROCEDURE — G8419 CALC BMI OUT NRM PARAM NOF/U: HCPCS | Performed by: INTERNAL MEDICINE

## 2022-06-13 PROCEDURE — G8427 DOCREV CUR MEDS BY ELIG CLIN: HCPCS | Performed by: INTERNAL MEDICINE

## 2022-06-13 PROCEDURE — G8536 NO DOC ELDER MAL SCRN: HCPCS | Performed by: INTERNAL MEDICINE

## 2022-06-13 PROCEDURE — 85025 COMPLETE CBC W/AUTO DIFF WBC: CPT

## 2022-06-13 PROCEDURE — G8752 SYS BP LESS 140: HCPCS | Performed by: INTERNAL MEDICINE

## 2022-06-13 PROCEDURE — 80053 COMPREHEN METABOLIC PANEL: CPT

## 2022-06-13 PROCEDURE — 36415 COLL VENOUS BLD VENIPUNCTURE: CPT

## 2022-06-13 PROCEDURE — 82550 ASSAY OF CK (CPK): CPT

## 2022-06-13 PROCEDURE — 99214 OFFICE O/P EST MOD 30 MIN: CPT | Performed by: INTERNAL MEDICINE

## 2022-06-13 PROCEDURE — 1090F PRES/ABSN URINE INCON ASSESS: CPT | Performed by: INTERNAL MEDICINE

## 2022-06-13 RX ORDER — TIZANIDINE 2 MG/1
2 TABLET ORAL
Qty: 30 TABLET | Refills: 1 | Status: SHIPPED | OUTPATIENT
Start: 2022-06-13

## 2022-06-13 NOTE — PROGRESS NOTES
Patient seen for routine follow up with c/o muscle pain, Tylenol has little effect. Health Maintenance Due   Topic Date Due    Shingrix Vaccine Age 50> (1 of 2) Never done     1. \"Have you been to the ER, urgent care clinic since your last visit? Hospitalized since your last visit? \" No    2. \"Have you seen or consulted any other health care providers outside of the [address] [place] [address] since your last visit? \" No     3. For patients aged 39-70: Has the patient had a colonoscopy / FIT/ Cologuard? NA - based on age      If the patient is female:    4. For patients aged 41-77: Has the patient had a mammogram within the past 2 years? NA - based on age or sex      11. For patients aged 21-65: Has the patient had a pap smear?  NA - based on age or sex

## 2022-06-13 NOTE — PATIENT INSTRUCTIONS
Dermatomes: Anatomy Sketch    Current as of: November 15, 2021               Content Version: 13.2  © 2904-0574 [employer]. Care instructions adapted under license by [employer] (which disclaims liability or warranty for this information). If you have questions about a medical condition or this instruction, always ask your healthcare professional. [place] [address] any warranty or liability for your use of this information.

## 2022-06-13 NOTE — PROGRESS NOTES
Assessment/ Plan:   Diagnoses and all orders for this visit:    1. Essential hypertension-continue with present meds  -     CBC WITH AUTOMATED DIFF; Future  -     METABOLIC PANEL, COMPREHENSIVE; Future  -     ECHO ADULT COMPLETE; Future    2. Hyperlipidemia, unspecified hyperlipidemia type-back on Zocor  -     METABOLIC PANEL, COMPREHENSIVE; Future  -     LIPID PANEL; Future    3. Age-related osteoporosis without current pathological fracture-back on Prolia for the first time again after not getting this during the pandemic, next dose on 8/2022  Last Dexa done 2019:  Lumbar spine levels: L1-L4   Mean bone mineral density (BMD):  0.753 g/cm2     T-score: -3.6       Z-score: -0.8       BMD increased 8.2%, which is statistically significant within a 95 percent confidence interval compared to preceding study. Left distal 1/3 radius BMD:  0.432 g/cm2     T-score: -5.1       Z-score: -2.3   BMD decreased 9.6%, which is not statistically significant within a 95 percent confidence interval compared to preceding study. Left total proximal femur BMD: 0.726 g/cm2     T-score:  -2.2       Z-score:  +0.4       BMD increased 2.0%, which is not statistically significant within a 95 percent confidence interval compared to preceding study. Right total proximal femur BMD: 0.720 g/cm2   T-score:  -2.3       Z-score:  +0.4       BMD increased 3.0%, which is not statistically significant within a 95 percent confidence interval compared to preceding study. Left femoral neck BMD:  0.696 g/cm2   T-score:  -2.5   Z-score:  +0.3     Right femoral neck BMD:  0.650 g/cm2   T-score:  -2.8   Z-score:  0.0        4. Moderate single current episode of major depressive disorder (HCC)-continue with Remeron    5. Bronchiectasis without complication (HCC)-currently without any new sxs except for occasional cough    6. Myalgia  -     tiZANidine (ZANAFLEX) 2 mg tablet;  Take 1 Tablet by mouth three (3) times daily as needed (neck pain).  -     CK; Future    7. Numbness and tingling of left leg-?lumbar radiculitis  -     XR SPINE LUMB MIN 4 V; Future    8. Murmur, cardiac-audible bec of the weigth loss?  -     ECHO ADULT COMPLETE; Future        Follow-up and Dispositions    · Return in about 3 months (around 9/13/2022) for follow up. Chief Complaint   Patient presents with    Follow Up Chronic Condition    Muscle Pain     shoulder       Pt is a 79y.o. year old female who presents for follow up of her chronic medical problems    Health Maintenance Due   Topic Date Due    Shingrix Vaccine Age 50> (1 of 2) Never done      Wt Readings from Last 3 Encounters:   06/13/22 85 lb 6.4 oz (38.7 kg)   03/09/22 89 lb 6.4 oz (40.6 kg)   12/09/21 90 lb 3.2 oz (40.9 kg)   does not really like Boost or Ensure     BP Readings from Last 3 Encounters:   06/13/22 130/60   03/09/22 136/85   12/14/21 138/72   repeat BP-better    Lab Results   Component Value Date/Time    Cholesterol, total 243 (H) 03/09/2022 11:30 AM    HDL Cholesterol 103 (H) 03/09/2022 11:30 AM    LDL, calculated 128.2 (H) 03/09/2022 11:30 AM    VLDL, calculated 11.8 03/09/2022 11:30 AM    Triglyceride 59 03/09/2022 11:30 AM    CHOL/HDL Ratio 2.4 03/09/2022 11:30 AM   back on Zocor  Not fasting    Osteoporosis-back on Prolia    Depression-on Remeron      Body aches for a week now-needs a refill of Tizanidine    Numb on the left leg-worse now than before    Occasional cough      ROS:    Pt denies: Wt loss, Fever/Chills, HA, Visual changes, Fatigue, Chest pain, SOB, KUMAR, Abd pain, N/V/D/C, Blood in stool or urine, Edema. Pertinent positive as above in HPI.  All others were negative    Patient Active Problem List   Diagnosis Code    Hyperlipidemia E78.5    Essential hypertension, benign I10    Vitamin D deficiency E55.9    Advance directive discussed with patient Z70.80    Moderate single current episode of major depressive disorder ([facility] [address] F32.1    Age-related osteoporosis without current pathological fracture M81.0    Patient underweight R63.6    Bronchiectasis with acute exacerbation (HCC) J47.1    Pulmonary Mycobacterium avium complex (MAC) infection ([facility] A31.0    Gastroesophageal reflux disease K21.9    Osteoporosis M81.0       Past Medical History:   Diagnosis Date    Hypercholesterolemia     high in past / was on meds at that time but now off    MYRIAM (mycobacterium avium-intracellulare) infection ([facility] [address]     treated and released by Pulmonary       Current Outpatient Medications   Medication Sig Dispense Refill    mirtazapine (REMERON) 45 mg tablet TAKE 1 TABLET BY MOUTH NIGHTLY. 90 Tablet 0    simvastatin (ZOCOR) 10 mg tablet Take 1 Tablet by mouth nightly. 90 Tablet 1    amLODIPine (NORVASC) 5 mg tablet Take 1 Tablet by mouth daily. 90 Tablet 1    tiZANidine (ZANAFLEX) 2 mg tablet Take 1 Tablet by mouth three (3) times daily as needed (neck pain). 30 Tablet 0    guaifenesin/pseudoephedrne HCl (MUCINEX D PO) Take  by mouth.  lansoprazole (PREVACID) 30 mg capsule Take  by mouth Daily (before breakfast).  CALCIUM PHOSPHATE TRIB/VIT D3 (CITRACAL + D PO) Take  by mouth.  flaxseed oil 1,000 mg Cap Take  by mouth.  red yeast rice extract 600 mg Cap Take 600 mg by mouth now. Social History     Tobacco Use   Smoking Status Never Smoker   Smokeless Tobacco Never Used       No Known Allergies    Patient Labs were reviewed: yes    Patient Past Records were reviewed: yes      Objective:     Vitals:    06/13/22 0934 06/13/22 0942   BP: (!) 140/61 130/60   Pulse: 76    Resp: 16    Temp: 98.2 °F (36.8 °C)    TempSrc: Temporal    SpO2: 93%    Weight: 85 lb 6.4 oz (38.7 kg)    Height: 5' 1\" (1.549 m)      Body mass index is 16.14 kg/m². Exam:   Appearance: alert, well appearing but very frail,  oriented to person, place, and time, acyanotic, in no respiratory distress and well hydrated.   HEENT:  NC/AT, pink conj, anicteric sclerae  Neck:  No cervical lymphadenopathy, no JVD, no thyromegaly, no carotid bruit  Heart:  RRR , murmur best heard at the mitral area  Lungs:  CTAB, no rhonchi, rales, or wheezes with good air exchange   Abdomen:  Non-tender, pos bowel sounds, no hepatosplenomegaly  Ext:  No C/C/E    Skin: no rash  Neuro: no lateralizing signs, CNs II-XII intact  No reproducible pain along the spine          I have discussed the diagnosis with the patient and the intended plan as seen in the above orders. The patient has received an After-Visit Summary and questions were answered concerning future plans. Medication Side Effects and Warnings were discussed with patient: yes    Patient verbalized understanding of above instructions.     Riaz Pena MD  Internal Medicine  [facility]

## 2022-06-14 ENCOUNTER — APPOINTMENT (OUTPATIENT)
Dept: INFUSION THERAPY | Age: 82
End: 2022-06-14
Attending: INTERNAL MEDICINE

## 2022-06-20 ENCOUNTER — HOSPITAL ENCOUNTER (OUTPATIENT)
Dept: INFUSION THERAPY | Age: 82
Discharge: HOME | End: 2022-06-20
Attending: INTERNAL MEDICINE
Payer: MEDICARE

## 2022-06-20 VITALS
DIASTOLIC BLOOD PRESSURE: 70 MMHG | TEMPERATURE: 97.9 F | HEART RATE: 79 BPM | BODY MASS INDEX: 16 KG/M2 | SYSTOLIC BLOOD PRESSURE: 149 MMHG | RESPIRATION RATE: 8 BRPM | WEIGHT: 84.7 LBS | OXYGEN SATURATION: 94 %

## 2022-06-20 DIAGNOSIS — M81.0 OSTEOPOROSIS, UNSPECIFIED OSTEOPOROSIS TYPE, UNSPECIFIED PATHOLOGICAL FRACTURE PRESENCE: Primary | ICD-10-CM

## 2022-06-20 PROCEDURE — 96372 THER/PROPH/DIAG INJ SC/IM: CPT

## 2022-06-20 PROCEDURE — 74011250636 HC RX REV CODE- 250/636: Performed by: INTERNAL MEDICINE

## 2022-06-20 RX ORDER — DIPHENHYDRAMINE HYDROCHLORIDE 50 MG/ML
50 INJECTION, SOLUTION INTRAMUSCULAR; INTRAVENOUS AS NEEDED
Start: 2022-12-12

## 2022-06-20 RX ORDER — EPINEPHRINE 1 MG/ML
0.3 INJECTION, SOLUTION, CONCENTRATE INTRAVENOUS AS NEEDED
OUTPATIENT
Start: 2022-12-12

## 2022-06-20 RX ORDER — ACETAMINOPHEN 325 MG/1
650 TABLET ORAL AS NEEDED
Start: 2022-12-12

## 2022-06-20 RX ORDER — DIPHENHYDRAMINE HYDROCHLORIDE 50 MG/ML
25 INJECTION, SOLUTION INTRAMUSCULAR; INTRAVENOUS AS NEEDED
Start: 2022-12-12

## 2022-06-20 RX ORDER — ONDANSETRON 2 MG/ML
8 INJECTION INTRAMUSCULAR; INTRAVENOUS AS NEEDED
OUTPATIENT
Start: 2022-12-12

## 2022-06-20 RX ORDER — HYDROCORTISONE SODIUM SUCCINATE 100 MG/2ML
100 INJECTION, POWDER, FOR SOLUTION INTRAMUSCULAR; INTRAVENOUS AS NEEDED
OUTPATIENT
Start: 2022-12-12

## 2022-06-20 RX ORDER — ALBUTEROL SULFATE 0.83 MG/ML
2.5 SOLUTION RESPIRATORY (INHALATION) AS NEEDED
Start: 2022-12-12

## 2022-06-20 RX ADMIN — DENOSUMAB 60 MG: 60 INJECTION SUBCUTANEOUS at 10:26

## 2022-06-20 NOTE — PROGRESS NOTES
[facility] Progress Note    Date: 2022    Name: Serjio Burgess    MRN: [id number]         : 1940    Prolia Injection     Ms. Bender arrived to [facility] at 21 [phone]. Ms. Jyoti Rosas was assessed and education was provided. Ms. Ingrid Yanez vitals were reviewed. Visit Vitals  BP (!) 149/70 (BP 1 Location: Right upper arm, BP Patient Position: Sitting)   Pulse 79   Temp 97.9 °F (36.6 °C)   Resp 8   Wt 38.4 kg (84 lb 11.2 oz)   SpO2 94%   Breastfeeding No   BMI 16.00 kg/m²       Lab results were obtained 22 and reviewed today prior to injection. Calcium 9.3     Prolia 60mg administered as ordered SQ in patient's Right upper arm ( right). Ms. Jyoti Rosas tolerated well without complaints. Discharge/ follow-up instructions discussed w/ pt. Pt verbalized understanding. Pt armband removed & shredded. Ms. Jyoti Rosas was discharged from [place] [address] in stable condition at 1030. She is to return on 22 at 1000 for her next appointment.     Judah Murray RN  2022

## 2022-06-29 LAB — EF %, EXTERNAL: NORMAL

## 2022-07-01 DIAGNOSIS — I10 ESSENTIAL HYPERTENSION, BENIGN: ICD-10-CM

## 2022-07-01 RX ORDER — AMLODIPINE BESYLATE 5 MG/1
5 TABLET ORAL DAILY
Qty: 90 TABLET | Refills: 1 | Status: SHIPPED | OUTPATIENT
Start: 2022-07-01

## 2022-08-01 DIAGNOSIS — F32.1 MODERATE SINGLE CURRENT EPISODE OF MAJOR DEPRESSIVE DISORDER (HCC): ICD-10-CM

## 2022-08-03 RX ORDER — MIRTAZAPINE 45 MG/1
45 TABLET, FILM COATED ORAL
Qty: 90 TABLET | Refills: 0 | Status: SHIPPED | OUTPATIENT
Start: 2022-08-03

## 2022-08-16 DIAGNOSIS — R01.1 MURMUR, CARDIAC: ICD-10-CM

## 2022-08-16 DIAGNOSIS — I10 ESSENTIAL HYPERTENSION: ICD-10-CM

## 2022-10-04 DIAGNOSIS — E78.5 HYPERLIPIDEMIA, UNSPECIFIED HYPERLIPIDEMIA TYPE: ICD-10-CM

## 2022-10-04 RX ORDER — SIMVASTATIN 10 MG/1
10 TABLET, FILM COATED ORAL
Qty: 90 TABLET | Refills: 1 | Status: SHIPPED | OUTPATIENT
Start: 2022-10-04

## 2022-11-09 ENCOUNTER — OFFICE VISIT (OUTPATIENT)
Dept: FAMILY MEDICINE CLINIC | Age: 82
End: 2022-11-09
Payer: MEDICARE

## 2022-11-09 VITALS
WEIGHT: 85 LBS | TEMPERATURE: 97.8 F | DIASTOLIC BLOOD PRESSURE: 60 MMHG | HEIGHT: 61 IN | RESPIRATION RATE: 14 BRPM | SYSTOLIC BLOOD PRESSURE: 132 MMHG | HEART RATE: 78 BPM | BODY MASS INDEX: 16.05 KG/M2

## 2022-11-09 DIAGNOSIS — I10 ESSENTIAL HYPERTENSION, BENIGN: ICD-10-CM

## 2022-11-09 DIAGNOSIS — Z23 NEEDS FLU SHOT: ICD-10-CM

## 2022-11-09 DIAGNOSIS — F32.1 MODERATE SINGLE CURRENT EPISODE OF MAJOR DEPRESSIVE DISORDER (HCC): ICD-10-CM

## 2022-11-09 DIAGNOSIS — J47.9 BRONCHIECTASIS WITHOUT COMPLICATION (HCC): ICD-10-CM

## 2022-11-09 DIAGNOSIS — M81.0 AGE-RELATED OSTEOPOROSIS WITHOUT CURRENT PATHOLOGICAL FRACTURE: ICD-10-CM

## 2022-11-09 DIAGNOSIS — Z00.00 MEDICARE ANNUAL WELLNESS VISIT, SUBSEQUENT: Primary | ICD-10-CM

## 2022-11-09 DIAGNOSIS — E78.5 HYPERLIPIDEMIA, UNSPECIFIED HYPERLIPIDEMIA TYPE: ICD-10-CM

## 2022-11-09 PROCEDURE — G0439 PPPS, SUBSEQ VISIT: HCPCS | Performed by: INTERNAL MEDICINE

## 2022-11-09 PROCEDURE — G8754 DIAS BP LESS 90: HCPCS | Performed by: INTERNAL MEDICINE

## 2022-11-09 PROCEDURE — 99214 OFFICE O/P EST MOD 30 MIN: CPT | Performed by: INTERNAL MEDICINE

## 2022-11-09 PROCEDURE — G8752 SYS BP LESS 140: HCPCS | Performed by: INTERNAL MEDICINE

## 2022-11-09 PROCEDURE — G8427 DOCREV CUR MEDS BY ELIG CLIN: HCPCS | Performed by: INTERNAL MEDICINE

## 2022-11-09 PROCEDURE — 3074F SYST BP LT 130 MM HG: CPT | Performed by: INTERNAL MEDICINE

## 2022-11-09 PROCEDURE — G9717 DOC PT DX DEP/BP F/U NT REQ: HCPCS | Performed by: INTERNAL MEDICINE

## 2022-11-09 PROCEDURE — 3078F DIAST BP <80 MM HG: CPT | Performed by: INTERNAL MEDICINE

## 2022-11-09 PROCEDURE — 1101F PT FALLS ASSESS-DOCD LE1/YR: CPT | Performed by: INTERNAL MEDICINE

## 2022-11-09 PROCEDURE — 90694 VACC AIIV4 NO PRSRV 0.5ML IM: CPT | Performed by: INTERNAL MEDICINE

## 2022-11-09 PROCEDURE — G8419 CALC BMI OUT NRM PARAM NOF/U: HCPCS | Performed by: INTERNAL MEDICINE

## 2022-11-09 PROCEDURE — 1090F PRES/ABSN URINE INCON ASSESS: CPT | Performed by: INTERNAL MEDICINE

## 2022-11-09 PROCEDURE — G8536 NO DOC ELDER MAL SCRN: HCPCS | Performed by: INTERNAL MEDICINE

## 2022-11-09 PROCEDURE — 1123F ACP DISCUSS/DSCN MKR DOCD: CPT | Performed by: INTERNAL MEDICINE

## 2022-11-09 PROCEDURE — G0008 ADMIN INFLUENZA VIRUS VAC: HCPCS | Performed by: INTERNAL MEDICINE

## 2022-11-09 RX ORDER — MIRTAZAPINE 45 MG/1
45 TABLET, FILM COATED ORAL
Qty: 90 TABLET | Refills: 1 | Status: SHIPPED | OUTPATIENT
Start: 2022-11-09

## 2022-11-09 RX ORDER — BENZONATATE 100 MG/1
100 CAPSULE ORAL
Qty: 30 CAPSULE | Refills: 0 | Status: SHIPPED | OUTPATIENT
Start: 2022-11-09 | End: 2022-11-19

## 2022-11-09 NOTE — PROGRESS NOTES
1. \"Have you been to the ER, urgent care clinic since your last visit? Hospitalized since your last visit? \" No    2. \"Have you seen or consulted any other health care providers outside of the [address] [place] [address] since your last visit? \" No     3. For patients aged 39-70: Has the patient had a colonoscopy / FIT/ Cologuard? NA - based on age      If the patient is female:    4. For patients aged 41-77: Has the patient had a mammogram within the past 2 years? NA - based on age or sex      11. For patients aged 21-65: Has the patient had a pap smear? NA - based on age or sex    Patient was given VIS for review, consent was obtained and per orders of Dr. Antonio Keller, injection of Fluad given by Sherren Millard LPN. Patient observed. No signs nor symptoms of any adverse reactions. Patient tolerated injection well.

## 2022-11-09 NOTE — PROGRESS NOTES
Assessment/ Plan:   Diagnoses and all orders for this visit:    1. Medicare annual wellness visit, subsequent-see note below    2. Essential hypertension, benign-controlled, continue with present meds    3. Hyperlipidemia, unspecified hyperlipidemia type-back on Zocor, check lipids next visit  The ASCVD Risk score (Jb HUGHES, et al., 2019) failed to calculate for the following reasons: The 2019 ASCVD risk score is only valid for ages 36 to 78     4. Moderate single current episode of major depressive disorder (HCC)-on Remeron to help her appetite as well  -     mirtazapine (REMERON) 45 mg tablet; Take 1 Tablet by mouth nightly. 5. Bronchiectasis without complication (HCC)-continue with daily Mucinex and prn Tessalon Perles; son says patient does not like inhalers or nebulizers  -     benzonatate (TESSALON) 100 mg capsule; Take 1 Capsule by mouth three (3) times daily as needed for Cough for up to 10 days. 6. Age-related osteoporosis without current pathological fracture-due for Prolia in December    Last Dexa was done 7/2019; however, Prolia was stopped during covid and this is only ehr second shot since; will repeat dexa after 4 Prolia shots    Lumbar spine levels: L1-L4   Mean bone mineral density (BMD):  0.753 g/cm2     T-score: -3.6       Z-score: -0.8       BMD increased 8.2%, which is statistically significant within a 95 percent confidence interval compared to preceding study. Left distal 1/3 radius BMD:  0.432 g/cm2     T-score: -5.1       Z-score: -2.3   BMD decreased 9.6%, which is not statistically significant within a 95 percent confidence interval compared to preceding study. Left total proximal femur BMD: 0.726 g/cm2     T-score:  -2.2       Z-score:  +0.4       BMD increased 2.0%, which is not statistically significant within a 95 percent confidence interval compared to preceding study.      Right total proximal femur BMD: 0.720 g/cm2   T-score:  -2.3       Z-score:  +0.4       BMD increased 3.0%, which is not statistically significant within a 95 percent confidence interval compared to preceding study. Left femoral neck BMD:  0.696 g/cm2   T-score:  -2.5   Z-score:  +0.3     Right femoral neck BMD:  0.650 g/cm2   T-score:  -2.8   Z-score:  0.0          Follow-up and Dispositions    Return in about 3 months (around 2/9/2023) for follow up. Chief Complaint   Patient presents with    Annual Wellness Visit    Follow Up Chronic Condition     3 month       Pt is a 79y.o. year old female who presents for follow up of her chronic medical problems    Health Maintenance Due   Topic Date Due    Shingrix Vaccine Age 49> (1 of 2) Never done    COVID-19 Vaccine (4 - Booster for [employer] series) 01/04/2022      Prolia by Dec 20th    Wt Readings from Last 3 Encounters:   11/09/22 85 lb (38.6 kg)   06/20/22 84 lb 11.2 oz (38.4 kg)   06/13/22 85 lb 6.4 oz (38.7 kg)        BP Readings from Last 3 Encounters:   11/09/22 132/60   06/20/22 (!) 149/70   06/13/22 130/60    Repeat BP-better  Took meds  Not fasting    Lab Results   Component Value Date/Time    Cholesterol, total 249 (H) 06/13/2022 10:44 AM    HDL Cholesterol 109 (H) 06/13/2022 10:44 AM    LDL, calculated 126.2 (H) 06/13/2022 10:44 AM    VLDL, calculated 13.8 06/13/2022 10:44 AM    Triglyceride 69 06/13/2022 10:44 AM    CHOL/HDL Ratio 2.3 06/13/2022 10:44 AM    Back on Zocor    Dizzines-resolved on its own, vertigo not near syncope    Cough still there but not worse; usually in the morning  Mucinex daily  Prolonged at times  Machines do not work        ROS:    Pt denies: Wt loss, Fever/Chills, HA, Visual changes, Fatigue, Chest pain, SOB, KUMAR, Abd pain, N/V/D/C, Blood in stool or urine, Edema. Pertinent positive as above in HPI.  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 90 Tablet 1    mirtazapine (REMERON) 45 mg tablet Take 1 Tablet by mouth nightly. 90 Tablet 0    amLODIPine (NORVASC) 5 mg tablet Take 1 Tablet by mouth daily. 90 Tablet 1    guaifenesin/pseudoephedrne HCl (MUCINEX D PO) Take  by mouth.      lansoprazole (PREVACID) 30 mg capsule Take  by mouth Daily (before breakfast). CALCIUM PHOSPHATE TRIB/VIT D3 (CITRACAL + D PO) Take  by mouth. flaxseed oil 1,000 mg Cap Take  by mouth. red yeast rice extract 600 mg Cap Take 600 mg by mouth now. Social History     Tobacco Use   Smoking Status Never   Smokeless Tobacco Never       No Known Allergies    Patient Labs were reviewed: yes    Patient Past Records were reviewed: yes      Objective:     Vitals:    11/09/22 1040 11/09/22 1115 11/09/22 1147   BP: (!) 151/69 (!) 142/68 132/60   Pulse: 80 78    Resp: 14     Temp: 97.8 °F (36.6 °C)     TempSrc: Temporal     Weight: 85 lb (38.6 kg)     Height: 5' 1\" (1.549 m)       Body mass index is 16.06 kg/m². Exam:   Appearance: alert, well appearing but very frail,  oriented to person, place, and time, acyanotic, in no respiratory distress and well hydrated.   HEENT:  NC/AT, pink conj, anicteric sclerae  Neck:  No cervical lymphadenopathy, no JVD, no thyromegaly, no carotid bruit  Heart:  RRR without M/R/G  Lungs:  crackles on the left base, dec air entry bilat, no wheezes   Abdomen:  Non-tender, pos bowel sounds, no hepatosplenomegaly  Ext:  No C/C/E    Skin: no rash  Neuro: no lateralizing signs, CNs II-XII intact            I have discussed the diagnosis with the patient and the intended plan as seen in the above orders. The patient has received an After-Visit Summary and questions were answered concerning future plans. Medication Side Effects and Warnings were discussed with patient: yes    Patient verbalized understanding of above instructions. Sharonda Cortes MD  Internal Medicine  [facility]         This is the Subsequent Medicare Annual Wellness Exam, performed 12 months or more after the Initial AWV or the last Subsequent AWV    I have reviewed the patient's medical history in detail and updated the computerized patient record. Assessment/Plan   Education and counseling provided:  Are appropriate based on today's review and evaluation  End-of-Life planning (with patient's consent)-primary decision maker verified  Pneumococcal Vaccine-done  Influenza Vaccine-today  Screening Mammography-no longer needed at her age  Colorectal cancer screening tests-as above  Cardiovascular screening blood test-lipids next visit  Bone mass measurement (DEXA)-after 3 more doses of Prolia  Screening for glaucoma-eye exam is up to date  Diabetes screening test-FBS up to date    1. Medicare annual wellness visit, subsequent-Refer to above for plan and to patient instructions for recommendations on HM     2.  Needs flu shot  -     INFLUENZA, FLUAD, (AGE 72 Y+), IM, PF, 0.5 ML     RTC yearly for wellness visit    Depression Risk Factor Screening     3 most recent PHQ Screens 11/9/2022   Little interest or pleasure in doing things Not at all   Feeling down, depressed, irritable, or hopeless Not at all   Total Score PHQ 2 0       Alcohol & Drug Abuse Risk Screen    Do you average more than 1 drink per night or more than 7 drinks a week:  No    On any one occasion in the past three months have you have had more than 3 drinks containing alcohol:  No          Functional Ability and Level of Safety    Hearing: Hearing is good. Activities of Daily Living: The home contains: handrails and grab bars  Patient needs help with:  phone, transportation, and shopping      Ambulation: with no difficulty     Fall Risk:  Fall Risk Assessment, last 12 mths 11/9/2022   Able to walk?  Yes   Fall in past 12 months? 0   Do you feel unsteady? -   Are you worried about falling -      Abuse Screen:  Patient is not abused       Cognitive Screening    Has your family/caregiver stated any concerns about your memory: no     Cognitive Screening: Normal - Clock Drawing Test    Health Maintenance Due     Health Maintenance Due   Topic Date Due    Shingrix Vaccine Age 49> (1 of 2) Never done    COVID-19 Vaccine (4 - Booster for [employer] series) 01/04/2022       Patient Care Team   Patient Care Team:  Eric Waters MD as PCP - General (Internal Medicine Physician)  Eric Waters MD as PCP - [facility] Empaneled Provider  Jazmin Morrison MD (Internal Medicine Physician)  Jaime Amaral MD (Ophthalmology)  Deb Leary MD (Gastroenterology)  Leonid Villalobos MD (Unknown Physician Specialty)    History     Patient Active Problem List   Diagnosis Code    Hyperlipidemia E78.5    Essential hypertension, benign I10    Vitamin D deficiency E55.9    Advance directive discussed with patient Z71.89    Moderate single current episode of major depressive disorder ([address] F32.1    Age-related osteoporosis without current pathological fracture M81.0    Patient underweight R63.6    Bronchiectasis with acute exacerbation ([address] J47.1    Pulmonary Mycobacterium avium complex (MAC) infection ([address] A31.0    Gastroesophageal reflux disease K21.9    Osteoporosis M81.0     Past Medical History:   Diagnosis Date    Hypercholesterolemia     high in past / was on meds at that time but now off    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 No pertinent surgical history. Current Outpatient Medications   Medication Sig Dispense Refill    mirtazapine (REMERON) 45 mg tablet Take 1 Tablet by mouth nightly. 90 Tablet 1    benzonatate (TESSALON) 100 mg capsule Take 1 Capsule by mouth three (3) times daily as needed for Cough for up to 10 days. 30 Capsule 0    simvastatin (ZOCOR) 10 mg tablet Take 1 Tablet by mouth nightly. 90 Tablet 1    amLODIPine (NORVASC) 5 mg tablet Take 1 Tablet by mouth daily. 90 Tablet 1    guaifenesin/pseudoephedrne HCl (MUCINEX D PO) Take  by mouth.      lansoprazole (PREVACID) 30 mg capsule Take  by mouth Daily (before breakfast). CALCIUM PHOSPHATE TRIB/VIT D3 (CITRACAL + D PO) Take  by mouth. flaxseed oil 1,000 mg Cap Take  by mouth. red yeast rice extract 600 mg Cap Take 600 mg by mouth now. No Known Allergies    History reviewed. No pertinent family history.   Social History     Tobacco Use    Smoking status: Never    Smokeless tobacco: Never   Substance Use Topics    Alcohol use: No         Qamar Cook MD

## 2022-11-09 NOTE — PATIENT INSTRUCTIONS
Vaccine Information Statement    Influenza (Flu) Vaccine (Inactivated or Recombinant): What You Need to Know    Many vaccine information statements are available in [place] and other languages. See www.immunize.org/vis. Hojas de información sobre vacunas están disponibles en español y en muchos otros idiomas. Visite www.immunize.org/vis. 1. Why get vaccinated? Influenza vaccine can prevent influenza (flu). Flu is a contagious disease that spreads around the [facility] every year, usually between October and May. Anyone can get the flu, but it is more dangerous for some people. Infants and young children, people 72 years and older, pregnant people, and people with certain health conditions or a weakened immune system are at greatest risk of flu complications. Pneumonia, bronchitis, sinus infections, and ear infections are examples of flu-related complications. If you have a medical condition, such as heart disease, cancer, or diabetes, flu can make it worse. Flu can cause fever and chills, sore throat, muscle aches, fatigue, cough, headache, and runny or stuffy nose. Some people may have vomiting and diarrhea, though this is more common in children than adults. In an average year, thousands of people in the [facility] die from flu, and many more are hospitalized. Flu vaccine prevents millions of illnesses and flu-related visits to the doctor each year. 2. Influenza vaccines     CDC recommends everyone 6 months and older get vaccinated every flu season. Children 6 months through 6years of age may need 2 doses during a single flu season. Everyone else needs only 1 dose each flu season. It takes about 2 weeks for protection to develop after vaccination. There are many flu viruses, and they are always changing. Each year a new flu vaccine is made to protect against the influenza viruses believed to be likely to cause disease in the upcoming flu season.  Even when the vaccine doesnt exactly match these viruses, it may still provide some protection. Influenza vaccine does not cause flu. Influenza vaccine may be given at the same time as other vaccines. 3. Talk with your health care provider    Tell your vaccination provider if the person getting the vaccine:  Has had an allergic reaction after a previous dose of influenza vaccine, or has any severe, life-threatening allergies   Has ever had Guillain-Barré Syndrome (also called GBS)    In some cases, your health care provider may decide to postpone influenza vaccination until a future visit. Influenza vaccine can be administered at any time during pregnancy. People who are or will be pregnant during influenza season should receive inactivated influenza vaccine. People with minor illnesses, such as a cold, may be vaccinated. People who are moderately or severely ill should usually wait until they recover before getting influenza vaccine. Your health care provider can give you more information. 4. Risks of a vaccine reaction    Soreness, redness, and swelling where the shot is given, fever, muscle aches, and headache can happen after influenza vaccination. There may be a very small increased risk of Guillain-Barré Syndrome (GBS) after inactivated influenza vaccine (the flu shot). Remus Barer children who get the flu shot along with pneumococcal vaccine (PCV13) and/or DTaP vaccine at the same time might be slightly more likely to have a seizure caused by fever. Tell your health care provider if a child who is getting flu vaccine has ever had a seizure. People sometimes faint after medical procedures, including vaccination. Tell your provider if you feel dizzy or have vision changes or ringing in the ears. As with any medicine, there is a very remote chance of a vaccine causing a severe allergic reaction, other serious injury, or death. 5. What if there is a serious problem?     An allergic reaction could occur after the vaccinated person leaves the clinic. If you see signs of a severe allergic reaction (hives, swelling of the face and throat, difficulty breathing, a fast heartbeat, dizziness, or weakness), call 9-1-1 and get the person to the nearest hospital.    For other signs that concern you, call your health care provider. Adverse reactions should be reported to the Vaccine Adverse Event Reporting System (VAERS). Your health care provider will usually file this report, or you can do it yourself. Visit the VAERS website at www.vaers. [facility].gov or call 4-198.798.8638. VAERS is only for reporting reactions, and VAERS staff members do not give medical advice. 6. The National Vaccine Injury Compensation Program    The [facility] Vaccine Injury Compensation Program (VICP) is a federal program that was created to compensate people who may have been injured by certain vaccines. Claims regarding alleged injury or death due to vaccination have a time limit for filing, which may be as short as two years. Visit the VICP website at www.[facility].gov/vaccinecompensation or call 7-661.782.1003 to learn about the program and about filing a claim. 7. How can I learn more? Ask your health care provider. Call your local or state health department. Visit the website of the Food and Drug Administration (FDA) for vaccine package inserts and additional information at www.fda.gov/vaccines-blood-biologics/vaccines. Contact the Centers for Disease Control and Prevention (CDC): Call 9-516.112.4729 (7-587-OFJ-INFO) or  Visit CDCs influenza website at www.cdc.gov/flu. Vaccine Information Statement   Inactivated Influenza Vaccine   8/6/2021  42 VAL [address] [id number]   Department of Health and Human Services  Centers for Disease Control and Prevention    Office Use Only      Medicare Wellness Visit, Female     The best way to live healthy is to have a lifestyle where you eat a well-balanced diet, exercise regularly, limit alcohol use, and quit all forms of tobacco/nicotine, if applicable. Regular preventive services are another way to keep healthy. Preventive services (vaccines, screening tests, monitoring & exams) can help personalize your care plan, which helps you manage your own care. Screening tests can find health problems at the earliest stages, when they are easiest to treat. [facility] follows the current, evidence-based guidelines published by the [facility] ([facility]) when recommending preventive services for our patients. Because we follow these guidelines, sometimes recommendations change over time as research supports it. (For example, mammograms used to be recommended annually. Even though Medicare will still pay for an annual mammogram, the newer guidelines recommend a mammogram every two years for women of average risk). Of course, you and your doctor may decide to screen more often for some diseases, based on your risk and your co-morbidities (chronic disease you are already diagnosed with). Preventive services for you include:  - Medicare offers their members a free annual wellness visit, which is time for you and your primary care provider to discuss and plan for your preventive service needs. Take advantage of this benefit every year!  -All adults over the age of 72 should receive the recommended pneumonia vaccines. Current USPSTF guidelines recommend a series of two vaccines for the best pneumonia protection.   -All adults should have a flu vaccine yearly and a tetanus vaccine every 10 years.   -All adults age 48 and older should receive the shingles vaccines (series of two vaccines).       -All adults age 38-68 who are overweight should have a diabetes screening test once every three years.   -All adults born between [birth year] and 1966 should be screened once for Hepatitis C.  -Other screening tests and preventive services for persons with diabetes include: an eye exam to screen for diabetic retinopathy, a kidney function test, a foot exam, and stricter control over your cholesterol.   -Cardiovascular screening for adults with routine risk involves an electrocardiogram (ECG) at intervals determined by your doctor.   -Colorectal cancer screenings should be done for adults age 54-65 with no increased risk factors for colorectal cancer. There are a number of acceptable methods of screening for this type of cancer. Each test has its own benefits and drawbacks. Discuss with your doctor what is most appropriate for you during your annual wellness visit. The different tests include: colonoscopy (considered the best screening method), a fecal occult blood test, a fecal DNA test, and sigmoidoscopy.    -A bone mass density test is recommended when a woman turns 65 to screen for osteoporosis. This test is only recommended one time, as a screening. Some providers will use this same test as a disease monitoring tool if you already have osteoporosis. -Breast cancer screenings are recommended every other year for women of normal risk, age 54-69.  -Cervical cancer screenings for women over age 72 are only recommended with certain risk factors.      Here is a list of your current Health Maintenance items (your personalized list of preventive services) with a due date:  Health Maintenance Due   Topic Date Due    Shingles Vaccine (1 of 2) Never done    COVID-19 Vaccine (4 - Booster for [employer] series) 01/04/2022

## 2022-12-14 ENCOUNTER — APPOINTMENT (OUTPATIENT)
Dept: INFUSION THERAPY | Age: 82
End: 2022-12-14
Attending: INTERNAL MEDICINE

## 2022-12-20 ENCOUNTER — APPOINTMENT (OUTPATIENT)
Dept: INFUSION THERAPY | Age: 82
End: 2022-12-20
Attending: INTERNAL MEDICINE

## 2023-01-10 DIAGNOSIS — E78.5 HYPERLIPIDEMIA, UNSPECIFIED HYPERLIPIDEMIA TYPE: ICD-10-CM

## 2023-01-11 RX ORDER — SIMVASTATIN 10 MG/1
10 TABLET, FILM COATED ORAL
Qty: 90 TABLET | Refills: 1 | Status: SHIPPED | OUTPATIENT
Start: 2023-01-11

## 2023-03-06 ENCOUNTER — OFFICE VISIT (OUTPATIENT)
Facility: CLINIC | Age: 83
End: 2023-03-06
Payer: MEDICARE

## 2023-03-06 ENCOUNTER — HOSPITAL ENCOUNTER (OUTPATIENT)
Facility: HOSPITAL | Age: 83
Setting detail: SPECIMEN
Discharge: HOME | End: 2023-03-09
Payer: MEDICARE

## 2023-03-06 VITALS
OXYGEN SATURATION: 93 % | RESPIRATION RATE: 15 BRPM | WEIGHT: 89 LBS | DIASTOLIC BLOOD PRESSURE: 68 MMHG | HEIGHT: 61 IN | BODY MASS INDEX: 16.8 KG/M2 | HEART RATE: 92 BPM | SYSTOLIC BLOOD PRESSURE: 140 MMHG

## 2023-03-06 DIAGNOSIS — M81.0 AGE-RELATED OSTEOPOROSIS WITHOUT CURRENT PATHOLOGICAL FRACTURE: ICD-10-CM

## 2023-03-06 DIAGNOSIS — I10 ESSENTIAL (PRIMARY) HYPERTENSION: ICD-10-CM

## 2023-03-06 DIAGNOSIS — E78.5 HYPERLIPIDEMIA, UNSPECIFIED HYPERLIPIDEMIA TYPE: ICD-10-CM

## 2023-03-06 DIAGNOSIS — J47.9 BRONCHIECTASIS, UNCOMPLICATED (HCC): ICD-10-CM

## 2023-03-06 DIAGNOSIS — F32.1 MAJOR DEPRESSIVE DISORDER, SINGLE EPISODE, MODERATE (HCC): ICD-10-CM

## 2023-03-06 DIAGNOSIS — I10 ESSENTIAL (PRIMARY) HYPERTENSION: Primary | ICD-10-CM

## 2023-03-06 LAB
ALBUMIN SERPL-MCNC: 3.6 G/DL (ref 3.4–5)
ALBUMIN/GLOB SERPL: 0.9 (ref 0.8–1.7)
ALP SERPL-CCNC: 62 U/L (ref 45–117)
ALT SERPL-CCNC: 19 U/L (ref 13–56)
ANION GAP SERPL CALC-SCNC: 6 MMOL/L (ref 3–18)
AST SERPL-CCNC: 22 U/L (ref 10–38)
BILIRUB SERPL-MCNC: 0.4 MG/DL (ref 0.2–1)
BUN SERPL-MCNC: 14 MG/DL (ref 7–18)
BUN/CREAT SERPL: 26 (ref 12–20)
CALCIUM SERPL-MCNC: 9.2 MG/DL (ref 8.5–10.1)
CHLORIDE SERPL-SCNC: 98 MMOL/L (ref 100–111)
CHOLEST SERPL-MCNC: 229 MG/DL
CO2 SERPL-SCNC: 31 MMOL/L (ref 21–32)
CREAT SERPL-MCNC: 0.54 MG/DL (ref 0.6–1.3)
ERYTHROCYTE [DISTWIDTH] IN BLOOD BY AUTOMATED COUNT: 12.9 % (ref 11.6–14.5)
GLOBULIN SER CALC-MCNC: 4.2 G/DL (ref 2–4)
GLUCOSE SERPL-MCNC: 92 MG/DL (ref 74–99)
HCT VFR BLD AUTO: 42.4 % (ref 35–45)
HDLC SERPL-MCNC: 94 MG/DL (ref 40–60)
HDLC SERPL: 2.4 (ref 0–5)
HGB BLD-MCNC: 14.1 G/DL (ref 12–16)
LDLC SERPL CALC-MCNC: 126.6 MG/DL (ref 0–100)
LIPID PANEL: ABNORMAL
MCH RBC QN AUTO: 31.3 PG (ref 24–34)
MCHC RBC AUTO-ENTMCNC: 33.3 G/DL (ref 31–37)
MCV RBC AUTO: 94.2 FL (ref 78–100)
NRBC # BLD: 0 K/UL (ref 0–0.01)
NRBC BLD-RTO: 0 PER 100 WBC
PLATELET # BLD AUTO: 233 K/UL (ref 135–420)
PMV BLD AUTO: 10.1 FL (ref 9.2–11.8)
POTASSIUM SERPL-SCNC: 4.3 MMOL/L (ref 3.5–5.5)
PROT SERPL-MCNC: 7.8 G/DL (ref 6.4–8.2)
RBC # BLD AUTO: 4.5 M/UL (ref 4.2–5.3)
SODIUM SERPL-SCNC: 135 MMOL/L (ref 136–145)
TRIGL SERPL-MCNC: 42 MG/DL
VLDLC SERPL CALC-MCNC: 8.4 MG/DL
WBC # BLD AUTO: 6.9 K/UL (ref 4.6–13.2)

## 2023-03-06 PROCEDURE — G8484 FLU IMMUNIZE NO ADMIN: HCPCS | Performed by: INTERNAL MEDICINE

## 2023-03-06 PROCEDURE — 1036F TOBACCO NON-USER: CPT | Performed by: INTERNAL MEDICINE

## 2023-03-06 PROCEDURE — 1123F ACP DISCUSS/DSCN MKR DOCD: CPT | Performed by: INTERNAL MEDICINE

## 2023-03-06 PROCEDURE — 85027 COMPLETE CBC AUTOMATED: CPT

## 2023-03-06 PROCEDURE — G8427 DOCREV CUR MEDS BY ELIG CLIN: HCPCS | Performed by: INTERNAL MEDICINE

## 2023-03-06 PROCEDURE — 80061 LIPID PANEL: CPT

## 2023-03-06 PROCEDURE — G8419 CALC BMI OUT NRM PARAM NOF/U: HCPCS | Performed by: INTERNAL MEDICINE

## 2023-03-06 PROCEDURE — G8399 PT W/DXA RESULTS DOCUMENT: HCPCS | Performed by: INTERNAL MEDICINE

## 2023-03-06 PROCEDURE — 99214 OFFICE O/P EST MOD 30 MIN: CPT | Performed by: INTERNAL MEDICINE

## 2023-03-06 PROCEDURE — 3078F DIAST BP <80 MM HG: CPT | Performed by: INTERNAL MEDICINE

## 2023-03-06 PROCEDURE — 1090F PRES/ABSN URINE INCON ASSESS: CPT | Performed by: INTERNAL MEDICINE

## 2023-03-06 PROCEDURE — 36415 COLL VENOUS BLD VENIPUNCTURE: CPT

## 2023-03-06 PROCEDURE — 3074F SYST BP LT 130 MM HG: CPT | Performed by: INTERNAL MEDICINE

## 2023-03-06 PROCEDURE — 80053 COMPREHEN METABOLIC PANEL: CPT

## 2023-03-06 RX ORDER — AMLODIPINE BESYLATE 5 MG/1
5 TABLET ORAL DAILY
Qty: 90 TABLET | Refills: 1 | Status: SHIPPED | OUTPATIENT
Start: 2023-03-06

## 2023-03-06 SDOH — ECONOMIC STABILITY: INCOME INSECURITY: HOW HARD IS IT FOR YOU TO PAY FOR THE VERY BASICS LIKE FOOD, HOUSING, MEDICAL CARE, AND HEATING?: PATIENT DECLINED

## 2023-03-06 SDOH — ECONOMIC STABILITY: FOOD INSECURITY: WITHIN THE PAST 12 MONTHS, YOU WORRIED THAT YOUR FOOD WOULD RUN OUT BEFORE YOU GOT MONEY TO BUY MORE.: PATIENT DECLINED

## 2023-03-06 SDOH — ECONOMIC STABILITY: HOUSING INSECURITY
IN THE LAST 12 MONTHS, WAS THERE A TIME WHEN YOU DID NOT HAVE A STEADY PLACE TO SLEEP OR SLEPT IN A SHELTER (INCLUDING NOW)?: PATIENT REFUSED

## 2023-03-06 SDOH — ECONOMIC STABILITY: FOOD INSECURITY: WITHIN THE PAST 12 MONTHS, THE FOOD YOU BOUGHT JUST DIDN'T LAST AND YOU DIDN'T HAVE MONEY TO GET MORE.: PATIENT DECLINED

## 2023-03-06 ASSESSMENT — PATIENT HEALTH QUESTIONNAIRE - PHQ9
SUM OF ALL RESPONSES TO PHQ QUESTIONS 1-9: 0
SUM OF ALL RESPONSES TO PHQ QUESTIONS 1-9: 0
4. FEELING TIRED OR HAVING LITTLE ENERGY: 0
10. IF YOU CHECKED OFF ANY PROBLEMS, HOW DIFFICULT HAVE THESE PROBLEMS MADE IT FOR YOU TO DO YOUR WORK, TAKE CARE OF THINGS AT HOME, OR GET ALONG WITH OTHER PEOPLE: 0
9. THOUGHTS THAT YOU WOULD BE BETTER OFF DEAD, OR OF HURTING YOURSELF: 0
SUM OF ALL RESPONSES TO PHQ9 QUESTIONS 1 & 2: 0
SUM OF ALL RESPONSES TO PHQ QUESTIONS 1-9: 0
8. MOVING OR SPEAKING SO SLOWLY THAT OTHER PEOPLE COULD HAVE NOTICED. OR THE OPPOSITE, BEING SO FIGETY OR RESTLESS THAT YOU HAVE BEEN MOVING AROUND A LOT MORE THAN USUAL: 0
6. FEELING BAD ABOUT YOURSELF - OR THAT YOU ARE A FAILURE OR HAVE LET YOURSELF OR YOUR FAMILY DOWN: 0
2. FEELING DOWN, DEPRESSED OR HOPELESS: 0
7. TROUBLE CONCENTRATING ON THINGS, SUCH AS READING THE NEWSPAPER OR WATCHING TELEVISION: 0
SUM OF ALL RESPONSES TO PHQ QUESTIONS 1-9: 0
5. POOR APPETITE OR OVEREATING: 0
3. TROUBLE FALLING OR STAYING ASLEEP: 0
1. LITTLE INTEREST OR PLEASURE IN DOING THINGS: 0

## 2023-03-06 NOTE — PROGRESS NOTES
Assessment/ Plan:   Mercedez was seen today for follow-up chronic condition and hypertension. Diagnoses and all orders for this visit:    Essential (primary) hypertension-will accept SBP bet 140-150 bec of her age and degree of fraility  -     amLODIPine (NORVASC) 5 MG tablet; Take 1 tablet by mouth daily  -     CBC; Future  -     Comprehensive Metabolic Panel; Future    Major depressive disorder, single episode, moderate (HCC)-continue with Remeron    Bronchiectasis, uncomplicated (HCC)-Continue with Prevacid, Mucinex    Hyperlipidemia, unspecified hyperlipidemia type-on Zocor  -     Lipid Panel; Future    Age-related osteoporosis without current pathological fracture-tolerating Prolia which she has been on and off of because of the pandemic; continue with daily Ca+D, encouraged to walk for exercise  Dexa done 2019:  Lumbar spine levels: L1-L4   Mean bone mineral density (BMD):  0.753 g/cm2     T-score: -3.6       Z-score: -0.8       BMD increased 8.2%, which is statistically significant within a 95 percent confidence interval compared to preceding study. Left distal 1/3 radius BMD:  0.432 g/cm2     T-score: -5.1       Z-score: -2.3   BMD decreased 9.6%, which is not statistically significant within a 95 percent confidence interval compared to preceding study. Left total proximal femur BMD: 0.726 g/cm2     T-score:  -2.2       Z-score:  +0.4       BMD increased 2.0%, which is not statistically significant within a 95 percent confidence interval compared to preceding study. Right total proximal femur BMD: 0.720 g/cm2   T-score:  -2.3       Z-score:  +0.4       BMD increased 3.0%, which is not statistically significant within a 95 percent confidence interval compared to preceding study.      Left femoral neck BMD:  0.696 g/cm2   T-score:  -2.5   Z-score:  +0.3     Right femoral neck BMD:  0.650 g/cm2   T-score:  -2.8   Z-score:  0.0      Follow-up and Dispositions    Return in about 3 months (around 6/6/2023) for follow up. Order Dexa and Prolia shot at [facility]   Patient presents with    Follow-up Chronic Condition    Hypertension       Pt is a 79y.o. year old female who presents for follow up of her chronic medical problems    Health Maintenance Due   Topic Date Due    Shingles vaccine (1 of 2) Never done    COVID-19 Vaccine (4 - Booster for Pfizer series) 01/04/2022      Wt Readings from Last 3 Encounters:   03/06/23 89 lb (40.4 kg)   11/09/22 85 lb (38.6 kg)   06/13/22 85 lb 6.4 oz (38.7 kg)        BP Readings from Last 3 Encounters:   03/06/23 (!) 140/68   11/09/22 132/60   06/13/22 130/60    Took meds today? Yes  Home Bps-normal  Repeat BP- better but still slightly elevated    Lab Results   Component Value Date/Time    CHOL 229 03/06/2023 10:04 AM    TRIG 42 03/06/2023 10:04 AM    HDL 94 03/06/2023 10:04 AM    LDLCALC 126.6 03/06/2023 10:04 AM      Depression-same mood per son    Bronchiectasis-Mucinex daily; no follow up with Pulmo; cough is stable  OTC Prevacid daily      Needs Prolia shot-to go to [place]      ROS:    Pt denies: Wt loss, Fever/Chills, HA, Visual changes, Fatigue, Chest pain, SOB, PICKARD, Abd pain, N/V/D/C, Blood in stool or urine, Edema. Pertinent positive as above in HPI.  All others were negative    Patient Active Problem List   Diagnosis    Major depressive disorder, single episode, moderate ([address]    Advance directive discussed with patient    Vitamin D deficiency    Gastroesophageal reflux disease    Bronchiectasis, uncomplicated ([address]    Patient underweight    Pulmonary Mycobacterium avium complex (MAC) infection ([address]    Osteoporosis    Age-related osteoporosis without current pathological fracture    Hyperlipidemia    Essential (primary) hypertension       Past Medical History:   Diagnosis Date    Hypercholesterolemia     high in past / was on meds at that time but now off    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[address]     treated and released by Pulmonary       Current Outpatient Medications   Medication Sig Dispense Refill    Calcium Citrate-Vitamin D (CALCIUM + D PO) Take by mouth      B Complex Vitamins (B-COMPLEX/B-12 PO) Take by mouth      Fexofenadine HCl (MUCINEX ALLERGY PO) Take by mouth      amLODIPine (NORVASC) 5 MG tablet Take 5 mg by mouth daily      Flaxseed, Linseed, (FLAX SEED OIL) 1000 MG CAPS Take by mouth      lansoprazole (PREVACID) 30 MG delayed release capsule Take by mouth every morning (before breakfast)      mirtazapine (REMERON) 45 MG tablet Take 45 mg by mouth      Red Yeast Rice Extract 600 MG CAPS Take 600 mg by mouth      simvastatin (ZOCOR) 10 MG tablet Take 10 mg by mouth       No current facility-administered medications for this visit. Social History     Tobacco Use   Smoking Status Never   Smokeless Tobacco Never       No Known Allergies    Patient Labs were reviewed: yes    Patient Past Records were reviewed: yes      Objective:     Vitals:    03/06/23 0909 03/06/23 1004   BP: (!) 164/71 (!) 140/68   Pulse: 92    Resp: 15    SpO2: 93%    Weight: 89 lb (40.4 kg)    Height: 5' 1\" (1.549 m)      Body mass index is 16.82 kg/m². Exam:   Appearance: alert, well appearing,  oriented to person, place, and time, acyanotic, in no respiratory distress and well hydrated. HEENT:  NC/AT, pink conj, anicteric sclerae  Neck:  No cervical lymphadenopathy, no JVD, no thyromegaly, no carotid bruit  Heart:  RRR without M/R/G  Lungs:  CTAB, no rhonchi, rales, or wheezes with good air exchange   Abdomen:  Non-tender, pos bowel sounds, no hepatosplenomegaly  Ext:  No C/C/E    Skin: no rash  Neuro: no lateralizing signs, CNs II-XII intact            I have discussed the diagnosis with the patient and the intended plan as seen in the above orders. The patient has received an After-Visit Summary and questions were answered concerning future plans.      Medication Side Effects and Warnings were discussed with patient: yes    Patient verbalized understanding of above instructions.     Supriya Murphy MD  Internal Medicine  [facility]

## 2023-03-06 NOTE — PROGRESS NOTES
When asked if patient has any concerns he/she would like to address with Dr. Capoen patient states No        Did patient bring someone? Yes      Did the patient have DME equipment?No      Did you take your medication today? No        1. \"Have you been to the ER, urgent care clinic since your last visit?  Hospitalized since your last visit?\" Patient states No     2. \"Have you seen or consulted any other health care providers outside of the [facility] since your last visit?\" No      3. For patients aged 45-75: Has the patient had a colonoscopy / FIT/ Cologuard? N/A        If the patient is female:     4. For patients aged 40-74: Has the patient had a mammogram within the past 2 years?  N/A        5. For patients aged 21-65: Has the patient had a pap smear? {Cancer Care Gap present? N/A        PHQ-9  3/6/2023   Little interest or pleasure in doing things 0   Little interest or pleasure in doing things -   Feeling down, depressed, or hopeless 0   Trouble falling or staying asleep, or sleeping too much 0   Feeling tired or having little energy 0   Poor appetite or overeating 0   Feeling bad about yourself - or that you are a failure or have let yourself or your family down 0   Trouble concentrating on things, such as reading the newspaper or watching television 0   Moving or speaking so slowly that other people could have noticed. Or the opposite - being so fidgety or restless that you have been moving around a lot more than usual 0   Thoughts that you would be better off dead, or of hurting yourself in some way 0   PHQ-2 Score 0   Total Score PHQ 2 -   PHQ-9 Total Score 0   If you checked off any problems, how difficult have these problems made it for you to do your work, take care of things at home, or get along with other people? 0        Health Maintenance   Topic Date Due    Shingles vaccine (1 of 2) Never done    COVID-19 Vaccine (4 - Booster for Pfizer series) 01/04/2022    Lipids   06/13/2023    Depression Monitoring  11/09/2023    Annual Wellness Visit (AWV)  11/10/2023    DTaP/Tdap/Td vaccine (2 - Td or Tdap) 07/28/2026    DEXA (modify frequency per FRAX score)  Completed    Flu vaccine  Completed    Pneumococcal 65+ years Vaccine  Completed    Hepatitis A vaccine  Aged Out    Hib vaccine  Aged Out    Meningococcal (ACWY) vaccine  Aged Out

## 2023-03-15 PROBLEM — J47.9 BRONCHIECTASIS, UNCOMPLICATED (HCC): Status: ACTIVE | Noted: 2018-06-11

## 2023-05-16 RX ORDER — MIRTAZAPINE 45 MG/1
45 TABLET, FILM COATED ORAL NIGHTLY
Qty: 90 TABLET | Refills: 1 | Status: SHIPPED | OUTPATIENT
Start: 2023-05-16

## 2023-07-13 NOTE — MR AVS SNAPSHOT
[address]:     [address] [facility] [place] [id number] [place] [address] f: [phone] f: [phone] p: [phone] [email or web address]      Ordering Center:     [address] [place] [address] Way    Phone: [phone]  Fax: [phone]    Patient Information:      Lilly Salazar  [address]  [place] [address] [phone]   : 1973  AGE: 47 y.o. GENDER: male   EPISODE DATE: 2023    Insurance:      PRIMARY INSURANCE:  Plan: [employer] BETTER HEALTH [facility]  Coverage: [facility]  Effective Date: 2023  Group Number: [unfilled]  Subscriber Number: [id number] - (Medicaid Managed)    Payer/Plan Subscr  Sex Relation Sub. Ins. ID Effective Group Num   1.  AETNA BETTER * BARRY ALVAREZ 1973 Male Self [id number]                                    [address] [address]       Patient Wound Information:      Problem List Items Addressed This Visit    None      WOUNDS REQUIRING DRESSING SUPPLIES:     Wound 06/15/23 Toe (Comment  which one) Right;Plantar #1 5th (Active)   Wound Image   23 1451   Wound Etiology Diabetic Lim 2 23 1451   Dressing Status Old drainage noted 23 1451   Wound Cleansed Cleansed with saline 23 1451   Dressing/Treatment Other (comment) 23 1529   Offloading for Diabetic Foot Ulcers Back offloading shoe 23 1451   Wound Length (cm) 1.3 cm 23 1451   Wound Width (cm) 1.5 cm 23 1451   Wound Depth (cm) 0.1 cm 23 1451   Wound Surface Area (cm^2) 1.95 cm^2 23 1451   Change in Wound Size % (l*w) 13.33 23 1451   Wound Volume (cm^3) 0.195 cm^3 23 1451   Wound Healing % 23 1451   Post-Procedure Length (cm) 1.3 cm 23 1523   Post-Procedure Width (cm) 1.5 cm 23 1523   Post-Procedure Depth (cm) 0.1 cm 23   Post-Procedure Surface Area (cm^2) 1.95 cm^2 Visit Information Date & Time Provider Department Dept. Phone Encounter #  
 5/1/2017 10:00 AM Joyce Montesinos MD [facility] [id number] Follow-up Instructions Return in about 3 months (around 8/1/2017) for annual wellness, follow up. Your Appointments 7/31/2017 10:30 AM  
Office Visit with Joyce Montesinos MD  
[place] [address] ([facility]) Appt Note: mwv  
 [address] [place]  
  
   
 [address] Upcoming Health Maintenance Date Due  
 MEDICARE YEARLY EXAM 7/29/2017 INFLUENZA AGE 9 TO ADULT 8/1/2017 BREAST CANCER SCRN MAMMOGRAM 11/10/2017 GLAUCOMA SCREENING Q2Y 6/27/2018 COLONOSCOPY 1/1/2021 DTaP/Tdap/Td series (2 - Td) 7/28/2026 Allergies as of 5/1/2017  Review Complete On: 5/1/2017 By: Joyce Montesinos MD  
 No Known Allergies Current Immunizations  Reviewed on 10/31/2016 Name Date Influenza High Dose Vaccine PF 10/31/2016 Influenza Vaccine 9/15/2014, 12/16/2013 Influenza Vaccine Split 12/4/2012 Pneumococcal Conjugate (PCV-13) 10/9/2015 Pneumococcal Polysaccharide (PPSV-23) 1/1/2005 Tdap 7/28/2016 Not reviewed this visit You Were Diagnosed With   
  
 Codes Comments Essential hypertension, benign    -  Primary ICD-10-CM: I10 
ICD-9-CM: 401.1 Hyperlipidemia, unspecified hyperlipidemia type     ICD-10-CM: E78.5 ICD-9-CM: 272.4 Age-related osteoporosis without current pathological fracture     ICD-10-CM: M81.0 ICD-9-CM: 733.01 Vitals BP Pulse Temp Resp Height(growth percentile) Weight(growth percentile)  
 119/61 77 97.2 °F (36.2 °C) (Oral) 16 5' 1.5\" (1.562 m) 88 lb (39.9 kg) SpO2 BMI OB Status Smoking Status 95% 16.36 kg/m2 Postmenopausal Never Smoker Vitals History BMI and BSA Data Body Mass Index Body Surface Area 16.36 kg/m 2 1.32 m 2 Preferred Pharmacy Pharmacy Name Phone [facility] [address] [facility], [address] [place] [address] [phone] Your Updated Medication List  
  
   
This list is accurate as of: 5/1/17 10:39 AM.  Always use your most recent med list. amLODIPine 5 mg tablet Commonly known as:  Corinne Polly Take 1 Tab by mouth daily. CITRACAL + D PO Take  by mouth. denosumab 60 mg/mL injection Commonly known as:  Radha Greyon Once every 6 months  
  
 flaxseed oil 1,000 mg Cap Take  by mouth. LYCOPENE PO Take  by mouth.  
  
 mirtazapine 30 mg tablet Commonly known as:  Marcel Lass Take 1 Tab by mouth daily. red yeast rice extract 600 mg Cap Take 600 mg by mouth now.  
  
 simvastatin 20 mg tablet Commonly known as:  ZOCOR Take 1 Tab by mouth nightly. Prescriptions Printed Refills  
 amLODIPine (NORVASC) 5 mg tablet 3 Sig: Take 1 Tab by mouth daily. Class: Print Route: Oral  
  
Follow-up Instructions Return in about 3 months (around 8/1/2017) for annual wellness, follow up. Patient Instructions Osteoporosis: Care Instructions Your Care Instructions Osteoporosis causes bones to become thin and weak. It is much more common in women than in men. Osteoporosis may be very advanced before you know you have it. Sometimes the first sign is a broken bone in the hip, spine, or wrist or sudden pain in your middle or lower back. Follow-up care is a key part of your treatment and safety. Be sure to make and go to all appointments, and call your doctor if you are having problems. Its also a good idea to know your test results and keep a list of the medicines you take. How can you care for yourself at home? · Your doctor may prescribe a bisphosphonate, such as risedronate (Actonel) or alendronate (Fosamax), for osteoporosis.  If you are taking one of these medicines by mouth: 
 ¨ Take your medicine with a full glass of water when you first get up in the morning. ¨ Do not lie down, eat, drink a beverage, or take any other medicine for at least 30 minutes after taking the drug. This helps prevent stomach problems. ¨ Do not take your medicine late in the day if you forgot to take it in the morning. Skip it, and take the usual dose the next morning. ¨ If you have side effects, tell your doctor. He or she may prescribe another medicine. · Get enough calcium and vitamin D. The [employer] of [employer] recommends adults younger than age 46 need 1,000 mg of calcium and 600 IU of vitamin D each day. Women ages 46 to 79 need 1,200 mg of calcium and 600 IU of vitamin D each day. Men ages 46 to 79 need 1,000 mg of calcium and 600 IU of vitamin D each day. Adults 71 and older need 1,200 mg of calcium and 800 IU of vitamin D each day. Ani Barajas ¨ Eat foods rich in calcium, like yogurt, cheese, milk, and dark green vegetables. This is a good way to get the calcium you need. You can get vitamin D from eggs, fatty fish, cereal, and milk. ¨ Talk to your doctor about taking a calcium plus vitamin D supplement. Be careful, though. Adults ages 23 to 48 should not get more than 2,500 mg of calcium and 4,000 IU of vitamin D each day, whether it is from supplements and/or food. Adults ages 46 and older should not get more than 2,000 mg of calcium and 4,000 IU of vitamin D each day from supplements and/or food. · Limit alcohol to 2 drinks a day for men and 1 drink a day for women. Too much alcohol can cause health problems. · Do not smoke. Smoking puts you at a much higher risk for osteoporosis. If you need help quitting, talk to your doctor about stop-smoking programs and medicines. These can increase your chances of quitting for good. · Get regular bone-building exercise.  Weight-bearing and resistance exercises keep bones healthy by working the muscles and bones against gravity. Start out at an exercise level that feels right for you. Add a little at a time until you can do the following: ¨ Do 30 minutes of weight-bearing exercise on most days of the week. Walking, jogging, stair climbing, and dancing are good choices. ¨ Do resistance exercises with weights or elastic bands 2 to 3 days a week. · Reduce your risk of falls: 
¨ Wear supportive shoes with low heels and nonslip soles. ¨ Use a cane or walker, if you need it. Use shower chairs and bath benches. Put in handrails on stairways, around your shower or tub area, and near the toilet. ¨ Keep stairs, porches, and walkways well lit. Use night-lights. ¨ Remove throw rugs and other objects that are in the way. ¨ Avoid icy, wet, or slippery surfaces. ¨ Keep a cordless phone and a flashlight with new batteries by your bed. When should you call for help? Call your doctor now or seek immediate medical care if: 
· You think you have broken a bone, have pain and swelling after a fall, or cannot move a part of your body. · You have sudden, severe pain when you stand or walk. Watch closely for changes in your health, and be sure to contact your doctor if you have any problems. Where can you learn more? Go to http://mandy-rossy.info/. Enter K100 in the search box to learn more about \"Osteoporosis: Care Instructions. \" Current as of: August 4, 2016 Content Version: 11.2 © 1117-0202 [employer]. Care instructions adapted under license by [employer] (which disclaims liability or warranty for this information). If you have questions about a medical condition or this instruction, always ask your healthcare professional. [address] any warranty or liability for your use of this information. Introducing [facility]! Dear Giulia Garcia: 
Thank you for requesting a [employer] account.   Our records indicate that you already have an active [employer] account. You can access your account anytime at [email or web address] Did you know that you can access your hospital and ER discharge instructions at any time in [employer]? You can also review all of your test results from your hospital stay or ER visit. Additional Information If you have questions, please visit the Frequently Asked Questions section of the [employer] website at [email or web address] Remember, [employer] is NOT to be used for urgent needs. For medical emergencies, dial 911. Now available from your iPhone and Android! Please provide this summary of care documentation to your next provider. Your primary care clinician is listed as Jillyn Carrel. If you have any questions after today's visit, please call [phone].

## 2023-09-18 ENCOUNTER — OFFICE VISIT (OUTPATIENT)
Facility: CLINIC | Age: 83
End: 2023-09-18
Payer: MEDICARE

## 2023-09-18 VITALS
BODY MASS INDEX: 16.62 KG/M2 | TEMPERATURE: 97.6 F | OXYGEN SATURATION: 96 % | RESPIRATION RATE: 16 BRPM | WEIGHT: 88 LBS | SYSTOLIC BLOOD PRESSURE: 136 MMHG | DIASTOLIC BLOOD PRESSURE: 62 MMHG | HEART RATE: 88 BPM | HEIGHT: 61 IN

## 2023-09-18 DIAGNOSIS — J47.9 BRONCHIECTASIS, UNCOMPLICATED (HCC): Primary | ICD-10-CM

## 2023-09-18 DIAGNOSIS — Z23 NEED FOR INFLUENZA VACCINATION: ICD-10-CM

## 2023-09-18 DIAGNOSIS — E78.5 HYPERLIPIDEMIA, UNSPECIFIED HYPERLIPIDEMIA TYPE: ICD-10-CM

## 2023-09-18 DIAGNOSIS — I10 ESSENTIAL (PRIMARY) HYPERTENSION: ICD-10-CM

## 2023-09-18 DIAGNOSIS — L03.012: ICD-10-CM

## 2023-09-18 DIAGNOSIS — M81.0 AGE-RELATED OSTEOPOROSIS WITHOUT CURRENT PATHOLOGICAL FRACTURE: ICD-10-CM

## 2023-09-18 PROCEDURE — 1123F ACP DISCUSS/DSCN MKR DOCD: CPT | Performed by: INTERNAL MEDICINE

## 2023-09-18 PROCEDURE — G0008 ADMIN INFLUENZA VIRUS VAC: HCPCS | Performed by: INTERNAL MEDICINE

## 2023-09-18 PROCEDURE — 1090F PRES/ABSN URINE INCON ASSESS: CPT | Performed by: INTERNAL MEDICINE

## 2023-09-18 PROCEDURE — 99214 OFFICE O/P EST MOD 30 MIN: CPT | Performed by: INTERNAL MEDICINE

## 2023-09-18 PROCEDURE — 3074F SYST BP LT 130 MM HG: CPT | Performed by: INTERNAL MEDICINE

## 2023-09-18 PROCEDURE — G8419 CALC BMI OUT NRM PARAM NOF/U: HCPCS | Performed by: INTERNAL MEDICINE

## 2023-09-18 PROCEDURE — 3078F DIAST BP <80 MM HG: CPT | Performed by: INTERNAL MEDICINE

## 2023-09-18 PROCEDURE — G8399 PT W/DXA RESULTS DOCUMENT: HCPCS | Performed by: INTERNAL MEDICINE

## 2023-09-18 PROCEDURE — G8427 DOCREV CUR MEDS BY ELIG CLIN: HCPCS | Performed by: INTERNAL MEDICINE

## 2023-09-18 PROCEDURE — 90662 IIV NO PRSV INCREASED AG IM: CPT | Performed by: INTERNAL MEDICINE

## 2023-09-18 PROCEDURE — 1036F TOBACCO NON-USER: CPT | Performed by: INTERNAL MEDICINE

## 2023-09-18 RX ORDER — DOXYCYCLINE HYCLATE 100 MG
100 TABLET ORAL 2 TIMES DAILY
Qty: 14 TABLET | Refills: 0 | Status: SHIPPED | OUTPATIENT
Start: 2023-09-18 | End: 2023-09-25

## 2023-09-18 SDOH — ECONOMIC STABILITY: FOOD INSECURITY: WITHIN THE PAST 12 MONTHS, THE FOOD YOU BOUGHT JUST DIDN'T LAST AND YOU DIDN'T HAVE MONEY TO GET MORE.: NEVER TRUE

## 2023-09-18 SDOH — ECONOMIC STABILITY: HOUSING INSECURITY
IN THE LAST 12 MONTHS, WAS THERE A TIME WHEN YOU DID NOT HAVE A STEADY PLACE TO SLEEP OR SLEPT IN A SHELTER (INCLUDING NOW)?: NO

## 2023-09-18 SDOH — ECONOMIC STABILITY: FOOD INSECURITY: WITHIN THE PAST 12 MONTHS, YOU WORRIED THAT YOUR FOOD WOULD RUN OUT BEFORE YOU GOT MONEY TO BUY MORE.: NEVER TRUE

## 2023-09-18 SDOH — ECONOMIC STABILITY: INCOME INSECURITY: HOW HARD IS IT FOR YOU TO PAY FOR THE VERY BASICS LIKE FOOD, HOUSING, MEDICAL CARE, AND HEATING?: NOT HARD AT ALL

## 2023-09-18 ASSESSMENT — PATIENT HEALTH QUESTIONNAIRE - PHQ9
1. LITTLE INTEREST OR PLEASURE IN DOING THINGS: 0
SUM OF ALL RESPONSES TO PHQ9 QUESTIONS 1 & 2: 0
SUM OF ALL RESPONSES TO PHQ9 QUESTIONS 1 & 2: 0
4. FEELING TIRED OR HAVING LITTLE ENERGY: 0
SUM OF ALL RESPONSES TO PHQ QUESTIONS 1-9: 0
SUM OF ALL RESPONSES TO PHQ QUESTIONS 1-9: 0
2. FEELING DOWN, DEPRESSED OR HOPELESS: 0
2. FEELING DOWN, DEPRESSED OR HOPELESS: 0
SUM OF ALL RESPONSES TO PHQ QUESTIONS 1-9: 0
3. TROUBLE FALLING OR STAYING ASLEEP: 0
6. FEELING BAD ABOUT YOURSELF - OR THAT YOU ARE A FAILURE OR HAVE LET YOURSELF OR YOUR FAMILY DOWN: 0
SUM OF ALL RESPONSES TO PHQ QUESTIONS 1-9: 0
SUM OF ALL RESPONSES TO PHQ QUESTIONS 1-9: 0
7. TROUBLE CONCENTRATING ON THINGS, SUCH AS READING THE NEWSPAPER OR WATCHING TELEVISION: 0
9. THOUGHTS THAT YOU WOULD BE BETTER OFF DEAD, OR OF HURTING YOURSELF: 0
SUM OF ALL RESPONSES TO PHQ QUESTIONS 1-9: 0
SUM OF ALL RESPONSES TO PHQ QUESTIONS 1-9: 0
10. IF YOU CHECKED OFF ANY PROBLEMS, HOW DIFFICULT HAVE THESE PROBLEMS MADE IT FOR YOU TO DO YOUR WORK, TAKE CARE OF THINGS AT HOME, OR GET ALONG WITH OTHER PEOPLE: 0
5. POOR APPETITE OR OVEREATING: 0
8. MOVING OR SPEAKING SO SLOWLY THAT OTHER PEOPLE COULD HAVE NOTICED. OR THE OPPOSITE, BEING SO FIGETY OR RESTLESS THAT YOU HAVE BEEN MOVING AROUND A LOT MORE THAN USUAL: 0
1. LITTLE INTEREST OR PLEASURE IN DOING THINGS: 0
SUM OF ALL RESPONSES TO PHQ QUESTIONS 1-9: 0

## 2023-09-18 NOTE — PROGRESS NOTES
Assessment/ Plan:   Mercedez was seen today for hypertension, hand injury and cough. Diagnoses and all orders for this visit:    Bronchiectasis, uncomplicated (HCC)-will rx nebulizert o see if this will help the cough; patient unable to use the inhaler    Abscess around fingernail of left hand-also advised to soak the finger in warm water with epsom salts  -     doxycycline hyclate (VIBRA-TABS) 100 MG tablet; Take 1 tablet by mouth 2 times daily for 7 days    Essential (primary) hypertension-controlled, continue with present meds    Age-related osteoporosis without current pathological fracture-on Prolia, next is due in Oct; advised to sched follow up Dexa as prev ordered in 6/2023 appt  Dexa done in 2019: she was off Prolia during Covid and restarted back on this last year  Lumbar spine levels: L1-L4   Mean bone mineral density (BMD):  0.753 g/cm2     T-score: -3.6       Z-score: -0.8       BMD increased 8.2%, which is statistically significant within a 95 percent confidence interval compared to preceding study. Left distal 1/3 radius BMD:  0.432 g/cm2     T-score: -5.1       Z-score: -2.3   BMD decreased 9.6%, which is not statistically significant within a 95 percent confidence interval compared to preceding study. Left total proximal femur BMD: 0.726 g/cm2     T-score:  -2.2       Z-score:  +0.4       BMD increased 2.0%, which is not statistically significant within a 95 percent confidence interval compared to preceding study. Right total proximal femur BMD: 0.720 g/cm2   T-score:  -2.3       Z-score:  +0.4       BMD increased 3.0%, which is not statistically significant within a 95 percent confidence interval compared to preceding study.      Left femoral neck BMD:  0.696 g/cm2   T-score:  -2.5   Z-score:  +0.3     Right femoral neck BMD:  0.650 g/cm2   T-score:  -2.8   Z-score:  0.0    Hyperlipidemia, unspecified hyperlipidemia type-continue with statin; cholesterol went back up when she got

## 2023-09-18 NOTE — PROGRESS NOTES
Viktoriya Keith presents today for   Chief Complaint   Patient presents with    Hypertension    Hand Injury     Left hand thumb swollen      Cough       Is someone accompanying this pt? Yes    Is the patient using any DME equipment during OV? No    Depression Screenin/18/2023    10:34 AM   PHQ-9    Little interest or pleasure in doing things 0   Feeling down, depressed, or hopeless 0   Trouble falling or staying asleep, or sleeping too much 0   Feeling tired or having little energy 0   Poor appetite or overeating 0   Feeling bad about yourself - or that you are a failure or have let yourself or your family down 0   Trouble concentrating on things, such as reading the newspaper or watching television 0   Moving or speaking so slowly that other people could have noticed. Or the opposite - being so fidgety or restless that you have been moving around a lot more than usual 0   Thoughts that you would be better off dead, or of hurting yourself in some way 0   PHQ-2 Score 0   PHQ-9 Total Score 0   If you checked off any problems, how difficult have these problems made it for you to do your work, take care of things at home, or get along with other people? 0               Health Maintenance reviewed and discussed and ordered per Provider. Health Maintenance Due   Topic Date Due    Shingles vaccine (1 of 2) Never done    COVID-19 Vaccine (4 - Pfizer series) 2022    Flu vaccine (1) 2023   . 1. \"Have you been to the ER, urgent care clinic since your last visit? Hospitalized since your last visit? \" Yes no    2. \"Have you seen or consulted any other health care providers outside of the [address] [place] [address] since your last visit? \" No    3. For patients over 45: Has the patient had a colonoscopy? No     If the patient is female:    4. For patients over 40: Has the patient had a mammogram? Yes - no Care Gap present    5. For patients over 21: Has the patient had a pap smear?  Yes - no Care Gap

## 2023-10-30 RX ORDER — MIRTAZAPINE 45 MG/1
45 TABLET, FILM COATED ORAL NIGHTLY
Qty: 90 TABLET | Refills: 1 | Status: SHIPPED | OUTPATIENT
Start: 2023-10-30

## 2023-12-04 ENCOUNTER — TELEPHONE (OUTPATIENT)
Facility: CLINIC | Age: 83
End: 2023-12-04

## 2023-12-04 RX ORDER — SIMVASTATIN 10 MG
10 TABLET ORAL NIGHTLY
Qty: 90 TABLET | Refills: 1 | Status: SHIPPED | OUTPATIENT
Start: 2023-12-04

## 2024-01-02 ENCOUNTER — HOSPITAL ENCOUNTER (OUTPATIENT)
Facility: HOSPITAL | Age: 84
Setting detail: SPECIMEN
Discharge: HOME | End: 2024-01-05
Payer: MEDICARE

## 2024-01-02 ENCOUNTER — OFFICE VISIT (OUTPATIENT)
Facility: CLINIC | Age: 84
End: 2024-01-02
Payer: MEDICARE

## 2024-01-02 VITALS
BODY MASS INDEX: 16.42 KG/M2 | WEIGHT: 87 LBS | OXYGEN SATURATION: 93 % | HEIGHT: 61 IN | DIASTOLIC BLOOD PRESSURE: 72 MMHG | RESPIRATION RATE: 14 BRPM | SYSTOLIC BLOOD PRESSURE: 138 MMHG | HEART RATE: 80 BPM | TEMPERATURE: 98.5 F

## 2024-01-02 DIAGNOSIS — E78.5 HYPERLIPIDEMIA, UNSPECIFIED HYPERLIPIDEMIA TYPE: ICD-10-CM

## 2024-01-02 DIAGNOSIS — Z00.00 MEDICARE ANNUAL WELLNESS VISIT, SUBSEQUENT: Primary | ICD-10-CM

## 2024-01-02 DIAGNOSIS — I10 ESSENTIAL (PRIMARY) HYPERTENSION: ICD-10-CM

## 2024-01-02 DIAGNOSIS — M81.0 AGE-RELATED OSTEOPOROSIS WITHOUT CURRENT PATHOLOGICAL FRACTURE: ICD-10-CM

## 2024-01-02 DIAGNOSIS — F32.1 MAJOR DEPRESSIVE DISORDER, SINGLE EPISODE, MODERATE (HCC): ICD-10-CM

## 2024-01-02 DIAGNOSIS — J47.9 BRONCHIECTASIS, UNCOMPLICATED (HCC): ICD-10-CM

## 2024-01-02 LAB
ALBUMIN SERPL-MCNC: 3.6 G/DL (ref 3.4–5)
ALBUMIN/GLOB SERPL: 0.9 (ref 0.8–1.7)
ALP SERPL-CCNC: 49 U/L (ref 45–117)
ALT SERPL-CCNC: 18 U/L (ref 13–56)
ANION GAP SERPL CALC-SCNC: 3 MMOL/L (ref 3–18)
AST SERPL-CCNC: 22 U/L (ref 10–38)
BILIRUB SERPL-MCNC: 0.4 MG/DL (ref 0.2–1)
BUN SERPL-MCNC: 9 MG/DL (ref 7–18)
BUN/CREAT SERPL: 16 (ref 12–20)
CALCIUM SERPL-MCNC: 9 MG/DL (ref 8.5–10.1)
CHLORIDE SERPL-SCNC: 97 MMOL/L (ref 100–111)
CHOLEST SERPL-MCNC: 213 MG/DL
CO2 SERPL-SCNC: 32 MMOL/L (ref 21–32)
CREAT SERPL-MCNC: 0.57 MG/DL (ref 0.6–1.3)
ERYTHROCYTE [DISTWIDTH] IN BLOOD BY AUTOMATED COUNT: 13 % (ref 11.6–14.5)
GLOBULIN SER CALC-MCNC: 4 G/DL (ref 2–4)
GLUCOSE SERPL-MCNC: 100 MG/DL (ref 74–99)
HCT VFR BLD AUTO: 42.7 % (ref 35–45)
HDLC SERPL-MCNC: 106 MG/DL (ref 40–60)
HDLC SERPL: 2 (ref 0–5)
HGB BLD-MCNC: 13.9 G/DL (ref 12–16)
LDLC SERPL CALC-MCNC: 94 MG/DL (ref 0–100)
LIPID PANEL: ABNORMAL
MCH RBC QN AUTO: 30.8 PG (ref 24–34)
MCHC RBC AUTO-ENTMCNC: 32.6 G/DL (ref 31–37)
MCV RBC AUTO: 94.5 FL (ref 78–100)
NRBC # BLD: 0 K/UL (ref 0–0.01)
NRBC BLD-RTO: 0 PER 100 WBC
PLATELET # BLD AUTO: 231 K/UL (ref 135–420)
PMV BLD AUTO: 10 FL (ref 9.2–11.8)
POTASSIUM SERPL-SCNC: 4.3 MMOL/L (ref 3.5–5.5)
PROT SERPL-MCNC: 7.6 G/DL (ref 6.4–8.2)
RBC # BLD AUTO: 4.52 M/UL (ref 4.2–5.3)
SODIUM SERPL-SCNC: 132 MMOL/L (ref 136–145)
TRIGL SERPL-MCNC: 65 MG/DL
VLDLC SERPL CALC-MCNC: 13 MG/DL
WBC # BLD AUTO: 5.8 K/UL (ref 4.6–13.2)

## 2024-01-02 PROCEDURE — G8399 PT W/DXA RESULTS DOCUMENT: HCPCS | Performed by: INTERNAL MEDICINE

## 2024-01-02 PROCEDURE — 36415 COLL VENOUS BLD VENIPUNCTURE: CPT

## 2024-01-02 PROCEDURE — 80061 LIPID PANEL: CPT

## 2024-01-02 PROCEDURE — G0439 PPPS, SUBSEQ VISIT: HCPCS | Performed by: INTERNAL MEDICINE

## 2024-01-02 PROCEDURE — 99214 OFFICE O/P EST MOD 30 MIN: CPT | Performed by: INTERNAL MEDICINE

## 2024-01-02 PROCEDURE — G8419 CALC BMI OUT NRM PARAM NOF/U: HCPCS | Performed by: INTERNAL MEDICINE

## 2024-01-02 PROCEDURE — 1123F ACP DISCUSS/DSCN MKR DOCD: CPT | Performed by: INTERNAL MEDICINE

## 2024-01-02 PROCEDURE — G8484 FLU IMMUNIZE NO ADMIN: HCPCS | Performed by: INTERNAL MEDICINE

## 2024-01-02 PROCEDURE — 1036F TOBACCO NON-USER: CPT | Performed by: INTERNAL MEDICINE

## 2024-01-02 PROCEDURE — G8427 DOCREV CUR MEDS BY ELIG CLIN: HCPCS | Performed by: INTERNAL MEDICINE

## 2024-01-02 PROCEDURE — 80053 COMPREHEN METABOLIC PANEL: CPT

## 2024-01-02 PROCEDURE — 3075F SYST BP GE 130 - 139MM HG: CPT | Performed by: INTERNAL MEDICINE

## 2024-01-02 PROCEDURE — 3078F DIAST BP <80 MM HG: CPT | Performed by: INTERNAL MEDICINE

## 2024-01-02 PROCEDURE — 1090F PRES/ABSN URINE INCON ASSESS: CPT | Performed by: INTERNAL MEDICINE

## 2024-01-02 PROCEDURE — 85027 COMPLETE CBC AUTOMATED: CPT

## 2024-01-02 RX ORDER — AMLODIPINE BESYLATE 5 MG/1
5 TABLET ORAL DAILY
Qty: 90 TABLET | Refills: 1 | Status: SHIPPED | OUTPATIENT
Start: 2024-01-02

## 2024-01-02 ASSESSMENT — PATIENT HEALTH QUESTIONNAIRE - PHQ9
1. LITTLE INTEREST OR PLEASURE IN DOING THINGS: 0
SUM OF ALL RESPONSES TO PHQ QUESTIONS 1-9: 0
2. FEELING DOWN, DEPRESSED OR HOPELESS: 0
SUM OF ALL RESPONSES TO PHQ9 QUESTIONS 1 & 2: 0
SUM OF ALL RESPONSES TO PHQ QUESTIONS 1-9: 0

## 2024-01-02 ASSESSMENT — LIFESTYLE VARIABLES
HOW MANY STANDARD DRINKS CONTAINING ALCOHOL DO YOU HAVE ON A TYPICAL DAY: PATIENT DOES NOT DRINK
HOW OFTEN DO YOU HAVE A DRINK CONTAINING ALCOHOL: NEVER

## 2024-01-02 NOTE — PROGRESS NOTES
Medicare Annual Wellness Visit    Mercedez Parnell is here for Medicare AWV (Follow up)    Assessment & Plan   Medicare annual wellness visit, subsequent-advised son patient needs RSV shot flaca with hx of bronchiectasis    Hyperlipidemia, unspecified hyperlipidemia type-goal LDL of less than 100 on Zocor  -     Lipid Panel; Future    Essential (primary) hypertension-controlled, continue with present meds  -     amLODIPine (NORVASC) 5 MG tablet; Take 1 tablet by mouth daily, Disp-90 tablet, R-1Normal  -     CBC; Future  -     Comprehensive Metabolic Panel; Future    Bronchiectasis, uncomplicated (HCC)-on Mucinex daily; declined neb and son does not think she knew how to use the inhaler properly    Age-related osteoporosis without current pathological fracture-on Prolia on and off, next one due in March  Last Dexa done 2019: showed improvement; did not get Prolia during the pandemic; will check Dexa at next visit  Lumbar spine levels: L1-L4   Mean bone mineral density (BMD):  0.753 g/cm2    T-score: -3.6       Z-score: -0.8      BMD increased 8.2%, which is statistically significant within a 95 percent confidence interval compared to preceding study.     Left distal 1/3 radius BMD:  0.432 g/cm2    T-score: -5.1       Z-score: -2.3   BMD decreased 9.6%, which is not statistically significant within a 95 percent confidence interval compared to preceding study.     Left total proximal femur BMD: 0.726 g/cm2    T-score:  -2.2       Z-score:  +0.4       BMD increased 2.0%, which is not statistically significant within a 95 percent confidence interval compared to preceding study.     Right total proximal femur BMD: 0.720 g/cm2   T-score:  -2.3      Z-score:  +0.4       BMD increased 3.0%, which is not statistically significant within a 95 percent confidence interval compared to preceding study.     Left femoral neck BMD:  0.696 g/cm2   T-score:  -2.5   Z-score:  +0.3     Right femoral neck BMD:  0.650 g/cm2   T-score:  -2.8

## 2024-01-02 NOTE — PROGRESS NOTES
1. \"Have you been to the ER, urgent care clinic since your last visit?  Hospitalized since your last visit?\" No    2. \"Have you seen or consulted any other health care providers outside of the [facility] since your last visit?\" No    3. For patients aged 45-75: Has the patient had a colonoscopy / FIT/ Cologuard? N/A      If the patient is female:    4. For patients aged 40-74: Has the patient had a mammogram within the past 2 years?   NA - based on age or sex    5. For patients aged 21-65: Has the patient had a pap smear? NA - based on age or sex    Health Maintenance Due   Topic Date Due    Shingles vaccine (1 of 2) Never done    Respiratory Syncytial Virus (RSV) Pregnant or age 60 yrs+ (1 - 1-dose 60+ series) Never done    COVID-19 Vaccine (4 - 2023-24 season) 09/01/2023    Annual Wellness Visit (Medicare)  11/27/2023

## 2024-01-02 NOTE — PATIENT INSTRUCTIONS

## 2024-05-08 ENCOUNTER — OFFICE VISIT (OUTPATIENT)
Facility: CLINIC | Age: 84
End: 2024-05-08
Payer: MEDICARE

## 2024-05-08 VITALS
WEIGHT: 84.6 LBS | OXYGEN SATURATION: 90 % | HEIGHT: 61 IN | TEMPERATURE: 98.4 F | SYSTOLIC BLOOD PRESSURE: 136 MMHG | HEART RATE: 83 BPM | BODY MASS INDEX: 15.97 KG/M2 | RESPIRATION RATE: 16 BRPM | DIASTOLIC BLOOD PRESSURE: 70 MMHG

## 2024-05-08 DIAGNOSIS — F32.1 MAJOR DEPRESSIVE DISORDER, SINGLE EPISODE, MODERATE (HCC): ICD-10-CM

## 2024-05-08 DIAGNOSIS — I10 ESSENTIAL (PRIMARY) HYPERTENSION: ICD-10-CM

## 2024-05-08 DIAGNOSIS — R93.89 ABNORMAL CXR (CHEST X-RAY): ICD-10-CM

## 2024-05-08 DIAGNOSIS — R04.2 HEMOPTYSIS: Primary | ICD-10-CM

## 2024-05-08 DIAGNOSIS — J47.9 BRONCHIECTASIS, UNCOMPLICATED (HCC): ICD-10-CM

## 2024-05-08 PROCEDURE — 1036F TOBACCO NON-USER: CPT | Performed by: INTERNAL MEDICINE

## 2024-05-08 PROCEDURE — G8419 CALC BMI OUT NRM PARAM NOF/U: HCPCS | Performed by: INTERNAL MEDICINE

## 2024-05-08 PROCEDURE — 3078F DIAST BP <80 MM HG: CPT | Performed by: INTERNAL MEDICINE

## 2024-05-08 PROCEDURE — G8427 DOCREV CUR MEDS BY ELIG CLIN: HCPCS | Performed by: INTERNAL MEDICINE

## 2024-05-08 PROCEDURE — 1123F ACP DISCUSS/DSCN MKR DOCD: CPT | Performed by: INTERNAL MEDICINE

## 2024-05-08 PROCEDURE — 99214 OFFICE O/P EST MOD 30 MIN: CPT | Performed by: INTERNAL MEDICINE

## 2024-05-08 PROCEDURE — 3075F SYST BP GE 130 - 139MM HG: CPT | Performed by: INTERNAL MEDICINE

## 2024-05-08 PROCEDURE — G8399 PT W/DXA RESULTS DOCUMENT: HCPCS | Performed by: INTERNAL MEDICINE

## 2024-05-08 PROCEDURE — 1090F PRES/ABSN URINE INCON ASSESS: CPT | Performed by: INTERNAL MEDICINE

## 2024-05-08 RX ORDER — CIPROFLOXACIN 500 MG/1
500 TABLET, FILM COATED ORAL 2 TIMES DAILY
Qty: 20 TABLET | Refills: 0 | Status: SHIPPED | OUTPATIENT
Start: 2024-05-08 | End: 2024-05-18

## 2024-05-08 RX ORDER — MIRTAZAPINE 45 MG/1
45 TABLET, FILM COATED ORAL NIGHTLY
Qty: 90 TABLET | Refills: 1 | Status: SHIPPED | OUTPATIENT
Start: 2024-05-08

## 2024-05-08 ASSESSMENT — PATIENT HEALTH QUESTIONNAIRE - PHQ9
SUM OF ALL RESPONSES TO PHQ9 QUESTIONS 1 & 2: 0
1. LITTLE INTEREST OR PLEASURE IN DOING THINGS: NOT AT ALL
SUM OF ALL RESPONSES TO PHQ QUESTIONS 1-9: 0
SUM OF ALL RESPONSES TO PHQ QUESTIONS 1-9: 0
2. FEELING DOWN, DEPRESSED OR HOPELESS: NOT AT ALL
SUM OF ALL RESPONSES TO PHQ QUESTIONS 1-9: 0
SUM OF ALL RESPONSES TO PHQ QUESTIONS 1-9: 0

## 2024-05-08 NOTE — PROGRESS NOTES
\"Have you been to the ER, urgent care clinic since your last visit?  Hospitalized since your last visit?\"    NO    “Have you seen or consulted any other health care providers outside of [facility] since your last visit?”    NO            Click Here for Release of Records Request

## 2024-05-08 NOTE — PROGRESS NOTES
Assessment/ Plan:   Mercedez was seen today for cough.    Diagnoses and all orders for this visit:    Hemoptysis-mostly blood streaked sputum for a few days so likely from bronchitis or exac of bronchiectasis; will treat with Cipro based on UPtodate  -     XR CHEST (2 VW); Future  -     ciprofloxacin (CIPRO) 500 MG tablet; Take 1 tablet by mouth 2 times daily for 10 days    Bronchiectasis, uncomplicated (HCC)-if cough and blood streaked sputum persists, will order CT  -     ciprofloxacin (CIPRO) 500 MG tablet; Take 1 tablet by mouth 2 times daily for 10 days    Major depressive disorder, single episode, moderate (HCC)-refilled med  -     mirtazapine (REMERON) 45 MG tablet; Take 1 tablet by mouth nightly    Essential (primary) hypertension-repeat BP better so continue with present meds        Follow-up and Dispositions    Return for previous appt.                 Chief Complaint   Patient presents with    Cough     Coughing up blood on 4/1/24-4/3/24       Pt is a 84 y.o. year old female who presents for follow up of her chronic medical problems    Health Maintenance Due   Topic Date Due    Shingles vaccine (1 of 2) Never done    Respiratory Syncytial Virus (RSV) Pregnant or age 60 yrs+ (1 - 1-dose 60+ series) Never done    COVID-19 Vaccine (4 - 2023-24 season) 09/01/2023      Wt Readings from Last 3 Encounters:   05/08/24 38.4 kg (84 lb 9.6 oz)   01/02/24 39.5 kg (87 lb)   09/18/23 39.9 kg (88 lb)        BP Readings from Last 3 Encounters:   05/08/24 136/70   01/02/24 138/72   09/18/23 136/62        Inc coughing last week woth 3 days of bright red mixed with sputum  No fever  No faintness  Has yellowish phlegm  Hx of Bronchiectasis    Needs refill of Mirtazapine      ROS:    Pt denies: Wt loss, Fever/Chills, HA, Visual changes, Fatigue, Chest pain, SOB, PICKARD, Abd pain, N/V/D/C, Blood in stool or urine, Edema. Pertinent positive as above in HPI. All others were negative    Patient Active Problem List   Diagnosis

## 2024-05-10 ENCOUNTER — TELEPHONE (OUTPATIENT)
Facility: CLINIC | Age: 84
End: 2024-05-10

## 2024-05-10 NOTE — TELEPHONE ENCOUNTER
Patient son is calling in because she has CT SCAN but he would like it sent through [facility] or somewhere near [place] he said sean javed is to far

## 2024-06-07 ENCOUNTER — TELEPHONE (OUTPATIENT)
Facility: CLINIC | Age: 84
End: 2024-06-07

## 2024-06-18 RX ORDER — SIMVASTATIN 10 MG
10 TABLET ORAL NIGHTLY
Qty: 90 TABLET | Refills: 1 | Status: SHIPPED | OUTPATIENT
Start: 2024-06-18

## 2024-07-02 ENCOUNTER — OFFICE VISIT (OUTPATIENT)
Facility: CLINIC | Age: 84
End: 2024-07-02

## 2024-07-02 VITALS
WEIGHT: 83.2 LBS | OXYGEN SATURATION: 92 % | SYSTOLIC BLOOD PRESSURE: 118 MMHG | HEART RATE: 85 BPM | DIASTOLIC BLOOD PRESSURE: 65 MMHG | TEMPERATURE: 98 F | BODY MASS INDEX: 15.71 KG/M2 | HEIGHT: 61 IN | RESPIRATION RATE: 18 BRPM

## 2024-07-02 DIAGNOSIS — A31.0 PULMONARY MYCOBACTERIUM AVIUM COMPLEX (MAC) INFECTION (HCC): Primary | ICD-10-CM

## 2024-07-02 DIAGNOSIS — E78.5 HYPERLIPIDEMIA, UNSPECIFIED HYPERLIPIDEMIA TYPE: ICD-10-CM

## 2024-07-02 DIAGNOSIS — J47.9 BRONCHIECTASIS, UNCOMPLICATED (HCC): ICD-10-CM

## 2024-07-02 DIAGNOSIS — I10 ESSENTIAL (PRIMARY) HYPERTENSION: ICD-10-CM

## 2024-07-02 DIAGNOSIS — M81.0 AGE-RELATED OSTEOPOROSIS WITHOUT CURRENT PATHOLOGICAL FRACTURE: ICD-10-CM

## 2024-07-02 RX ORDER — AMLODIPINE BESYLATE 5 MG/1
5 TABLET ORAL DAILY
Qty: 90 TABLET | Refills: 1 | Status: SHIPPED | OUTPATIENT
Start: 2024-07-02

## 2024-07-02 ASSESSMENT — PATIENT HEALTH QUESTIONNAIRE - PHQ9
SUM OF ALL RESPONSES TO PHQ QUESTIONS 1-9: 0
SUM OF ALL RESPONSES TO PHQ9 QUESTIONS 1 & 2: 0
SUM OF ALL RESPONSES TO PHQ QUESTIONS 1-9: 0
3. TROUBLE FALLING OR STAYING ASLEEP: NOT AT ALL
4. FEELING TIRED OR HAVING LITTLE ENERGY: NOT AT ALL
2. FEELING DOWN, DEPRESSED OR HOPELESS: NOT AT ALL
6. FEELING BAD ABOUT YOURSELF - OR THAT YOU ARE A FAILURE OR HAVE LET YOURSELF OR YOUR FAMILY DOWN: NOT AT ALL
7. TROUBLE CONCENTRATING ON THINGS, SUCH AS READING THE NEWSPAPER OR WATCHING TELEVISION: NOT AT ALL
1. LITTLE INTEREST OR PLEASURE IN DOING THINGS: NOT AT ALL
SUM OF ALL RESPONSES TO PHQ QUESTIONS 1-9: 0
8. MOVING OR SPEAKING SO SLOWLY THAT OTHER PEOPLE COULD HAVE NOTICED. OR THE OPPOSITE, BEING SO FIGETY OR RESTLESS THAT YOU HAVE BEEN MOVING AROUND A LOT MORE THAN USUAL: NOT AT ALL
9. THOUGHTS THAT YOU WOULD BE BETTER OFF DEAD, OR OF HURTING YOURSELF: NOT AT ALL
10. IF YOU CHECKED OFF ANY PROBLEMS, HOW DIFFICULT HAVE THESE PROBLEMS MADE IT FOR YOU TO DO YOUR WORK, TAKE CARE OF THINGS AT HOME, OR GET ALONG WITH OTHER PEOPLE: NOT DIFFICULT AT ALL
5. POOR APPETITE OR OVEREATING: NOT AT ALL
SUM OF ALL RESPONSES TO PHQ QUESTIONS 1-9: 0

## 2024-07-02 NOTE — PROGRESS NOTES
Room 1    Mercedez Parnell had concerns including Follow-up and Numbness (Both feet ). for today's visit .     When asked if patient has any concerns she/he would like to address with  both feet are numb off and on all day  .   Did patient bring someone? Yes son     Did the patient have DME equipment? No     Did you take your medication today? No       1. \"Have you been to the ER, urgent care clinic since your last visit?  Hospitalized since your last visit?\" .NO    2. \"Have you seen or consulted any other health care providers outside of the [facility] since your last visit?\" NO     3. For patients aged 45-75: Has the patient had a colonoscopy / FIT/ Cologuard? N/A      If the patient is female:    4. For patients aged 40-74: Has the patient had a mammogram within the past 2 years? N/A      5. For patients aged 21-65: Has the patient had a pap smear? {Cancer Care Gap present? N/A          1/2/2024     8:23 AM   Amb Fall Risk Assessment and TUG Test   Do you feel unsteady or are you worried about falling?  yes   2 or more falls in past year? no   Fall with injury in past year? no              7/2/2024     8:20 AM   PHQ-9    Little interest or pleasure in doing things 0   Feeling down, depressed, or hopeless 0   Trouble falling or staying asleep, or sleeping too much 0   Feeling tired or having little energy 0   Poor appetite or overeating 0   Feeling bad about yourself - or that you are a failure or have let yourself or your family down 0   Trouble concentrating on things, such as reading the newspaper or watching television 0   Moving or speaking so slowly that other people could have noticed. Or the opposite - being so fidgety or restless that you have been moving around a lot more than usual 0   Thoughts that you would be better off dead, or of hurting yourself in some way 0   PHQ-2 Score 0   PHQ-9 Total Score 0   If you checked off any problems, how difficult have these problems made it 
(B-COMPLEX/B-12 PO) Take by mouth      Fexofenadine HCl (MUCINEX ALLERGY PO) Take by mouth      lansoprazole (PREVACID) 30 MG delayed release capsule Take by mouth every morning (before breakfast)       No current facility-administered medications for this visit.       Social History     Tobacco Use   Smoking Status Never   Smokeless Tobacco Never       No Known Allergies    Patient Labs were reviewed: yes    Patient Past Records were reviewed: yes      Objective:     Vitals:    07/02/24 0823   BP: 118/65   Pulse: 85   Resp: 18   Temp: 98 °F (36.7 °C)   TempSrc: Temporal   SpO2: 92%   Weight: 37.7 kg (83 lb 3.2 oz)   Height: 1.549 m (5' 1\")     Body mass index is 15.72 kg/m².    Exam:   Appearance: alert, frail,  oriented to person, place, and time, acyanotic, in no respiratory distress and well hydrated.  HEENT:  NC/AT, pink conj, anicteric sclerae  Neck:  No cervical lymphadenopathy, no JVD, no thyromegaly, no carotid bruit  Heart:  RRR without M/R/G  Lungs:  crackles left lung base, dec air entry bilat   Abdomen:  Non-tender, pos bowel sounds, no hepatosplenomegaly  Ext:  No C/C/E    Skin: no rash  Neuro: no lateralizing signs, CNs II-XII intact            I have discussed the diagnosis with the patient and the intended plan as seen in the above orders.  The patient has received an After-Visit Summary and questions were answered concerning future plans.     Medication Side Effects and Warnings were discussed with patient: yes    Patient verbalized understanding of above instructions.    Cathryn Capone MD  Internal Medicine  [facility]

## 2024-09-30 DIAGNOSIS — I10 ESSENTIAL (PRIMARY) HYPERTENSION: ICD-10-CM

## 2024-09-30 RX ORDER — AMLODIPINE BESYLATE 5 MG/1
5 TABLET ORAL DAILY
Qty: 90 TABLET | Refills: 1 | Status: SHIPPED | OUTPATIENT
Start: 2024-09-30

## 2024-11-05 ENCOUNTER — OFFICE VISIT (OUTPATIENT)
Facility: CLINIC | Age: 84
End: 2024-11-05
Payer: MEDICARE

## 2024-11-05 VITALS
OXYGEN SATURATION: 90 % | SYSTOLIC BLOOD PRESSURE: 138 MMHG | TEMPERATURE: 98.3 F | HEART RATE: 71 BPM | RESPIRATION RATE: 16 BRPM | DIASTOLIC BLOOD PRESSURE: 71 MMHG | HEIGHT: 61 IN | BODY MASS INDEX: 16.27 KG/M2 | WEIGHT: 86.2 LBS

## 2024-11-05 DIAGNOSIS — A31.0 PULMONARY MYCOBACTERIUM AVIUM COMPLEX (MAC) INFECTION (HCC): ICD-10-CM

## 2024-11-05 DIAGNOSIS — M81.0 AGE-RELATED OSTEOPOROSIS WITHOUT CURRENT PATHOLOGICAL FRACTURE: ICD-10-CM

## 2024-11-05 DIAGNOSIS — I10 ESSENTIAL (PRIMARY) HYPERTENSION: Primary | ICD-10-CM

## 2024-11-05 DIAGNOSIS — E78.5 HYPERLIPIDEMIA, UNSPECIFIED HYPERLIPIDEMIA TYPE: ICD-10-CM

## 2024-11-05 DIAGNOSIS — J47.9 BRONCHIECTASIS, UNCOMPLICATED (HCC): ICD-10-CM

## 2024-11-05 DIAGNOSIS — F32.1 MAJOR DEPRESSIVE DISORDER, SINGLE EPISODE, MODERATE (HCC): ICD-10-CM

## 2024-11-05 PROCEDURE — G8419 CALC BMI OUT NRM PARAM NOF/U: HCPCS | Performed by: INTERNAL MEDICINE

## 2024-11-05 PROCEDURE — G8427 DOCREV CUR MEDS BY ELIG CLIN: HCPCS | Performed by: INTERNAL MEDICINE

## 2024-11-05 PROCEDURE — 1159F MED LIST DOCD IN RCRD: CPT | Performed by: INTERNAL MEDICINE

## 2024-11-05 PROCEDURE — 1036F TOBACCO NON-USER: CPT | Performed by: INTERNAL MEDICINE

## 2024-11-05 PROCEDURE — 1090F PRES/ABSN URINE INCON ASSESS: CPT | Performed by: INTERNAL MEDICINE

## 2024-11-05 PROCEDURE — 99214 OFFICE O/P EST MOD 30 MIN: CPT | Performed by: INTERNAL MEDICINE

## 2024-11-05 PROCEDURE — 3075F SYST BP GE 130 - 139MM HG: CPT | Performed by: INTERNAL MEDICINE

## 2024-11-05 PROCEDURE — G8399 PT W/DXA RESULTS DOCUMENT: HCPCS | Performed by: INTERNAL MEDICINE

## 2024-11-05 PROCEDURE — 3078F DIAST BP <80 MM HG: CPT | Performed by: INTERNAL MEDICINE

## 2024-11-05 PROCEDURE — 1123F ACP DISCUSS/DSCN MKR DOCD: CPT | Performed by: INTERNAL MEDICINE

## 2024-11-05 PROCEDURE — 1160F RVW MEDS BY RX/DR IN RCRD: CPT | Performed by: INTERNAL MEDICINE

## 2024-11-05 PROCEDURE — G8484 FLU IMMUNIZE NO ADMIN: HCPCS | Performed by: INTERNAL MEDICINE

## 2024-11-05 RX ORDER — MIRTAZAPINE 45 MG/1
45 TABLET, FILM COATED ORAL NIGHTLY
Qty: 90 TABLET | Refills: 1 | Status: SHIPPED | OUTPATIENT
Start: 2024-11-05

## 2024-11-05 RX ORDER — ALBUTEROL SULFATE 90 UG/1
2 INHALANT RESPIRATORY (INHALATION) 4 TIMES DAILY PRN
Qty: 18 G | Refills: 3 | Status: SHIPPED | OUTPATIENT
Start: 2024-11-05

## 2024-11-05 SDOH — ECONOMIC STABILITY: FOOD INSECURITY: WITHIN THE PAST 12 MONTHS, YOU WORRIED THAT YOUR FOOD WOULD RUN OUT BEFORE YOU GOT MONEY TO BUY MORE.: NEVER TRUE

## 2024-11-05 SDOH — ECONOMIC STABILITY: FOOD INSECURITY: WITHIN THE PAST 12 MONTHS, THE FOOD YOU BOUGHT JUST DIDN'T LAST AND YOU DIDN'T HAVE MONEY TO GET MORE.: NEVER TRUE

## 2024-11-05 SDOH — ECONOMIC STABILITY: INCOME INSECURITY: HOW HARD IS IT FOR YOU TO PAY FOR THE VERY BASICS LIKE FOOD, HOUSING, MEDICAL CARE, AND HEATING?: NOT HARD AT ALL

## 2024-11-05 ASSESSMENT — PATIENT HEALTH QUESTIONNAIRE - PHQ9
SUM OF ALL RESPONSES TO PHQ QUESTIONS 1-9: 0
2. FEELING DOWN, DEPRESSED OR HOPELESS: NOT AT ALL
SUM OF ALL RESPONSES TO PHQ QUESTIONS 1-9: 0
1. LITTLE INTEREST OR PLEASURE IN DOING THINGS: NOT AT ALL
SUM OF ALL RESPONSES TO PHQ9 QUESTIONS 1 & 2: 0

## 2024-11-05 NOTE — PROGRESS NOTES
Assessment/ Plan:   Mercedez was seen today for follow-up chronic condition.    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                     Chief Complaint   Patient presents with    Follow-up Chronic Condition     3 month follow up       Pt is a 84 y.o. year old female who presents for follow up of her chronic medical problems    Health Maintenance Due   Topic Date Due    Shingles vaccine (1 of 2) Never done    Respiratory Syncytial Virus (RSV) Pregnant or age 60 yrs+ (1 - 1-dose 60+ series) Never done    Flu vaccine (1)-done 08/01/2024    COVID-19 Vaccine (4 - 2023-24 season)-at her pharmacy 09/01/2024      Last Prolia-it has been a year  Last dexa was 2019-needs a repeat one prior to next Prolia      Wt Readings from Last 3 Encounters:   11/05/24 39.1 kg (86 lb 3.2 oz)   07/02/24 37.7 kg (83 lb 3.2 oz)   05/08/24 38.4 kg (84 lb 9.6 oz)        BP Readings from Last 3 Encounters:   11/05/24 138/71   07/02/24 118/65   05/08/24 136/70      Lab

## 2024-11-05 NOTE — PROGRESS NOTES
\"Have you been to the ER, urgent care clinic since your last visit?  Hospitalized since your last visit?\"    No     “Have you seen or consulted any other health care providers outside our system since your last visit?”    NO

## 2024-11-19 RX ORDER — SIMVASTATIN 10 MG
10 TABLET ORAL NIGHTLY
Qty: 90 TABLET | Refills: 1 | Status: SHIPPED | OUTPATIENT
Start: 2024-11-19

## 2024-12-30 DIAGNOSIS — I10 ESSENTIAL (PRIMARY) HYPERTENSION: ICD-10-CM

## 2024-12-30 NOTE — TELEPHONE ENCOUNTER
Mr. Emmett Parnell the son called for a refill request for his mother Mrs. Newsome Parmjit:    amLODIPine 5 MG tablet    Thanks

## 2024-12-31 RX ORDER — AMLODIPINE BESYLATE 5 MG/1
5 TABLET ORAL DAILY
Qty: 90 TABLET | Refills: 1 | Status: SHIPPED | OUTPATIENT
Start: 2024-12-31

## 2025-03-20 ENCOUNTER — OFFICE VISIT (OUTPATIENT)
Facility: CLINIC | Age: 85
End: 2025-03-20

## 2025-03-20 ENCOUNTER — HOSPITAL ENCOUNTER (OUTPATIENT)
Facility: HOSPITAL | Age: 85
Setting detail: SPECIMEN
Discharge: HOME | End: 2025-03-23
Payer: MEDICARE

## 2025-03-20 VITALS
TEMPERATURE: 98.2 F | RESPIRATION RATE: 14 BRPM | DIASTOLIC BLOOD PRESSURE: 71 MMHG | SYSTOLIC BLOOD PRESSURE: 138 MMHG | WEIGHT: 85 LBS | OXYGEN SATURATION: 89 % | HEART RATE: 85 BPM | HEIGHT: 61 IN | BODY MASS INDEX: 16.05 KG/M2

## 2025-03-20 DIAGNOSIS — E78.5 HYPERLIPIDEMIA, UNSPECIFIED HYPERLIPIDEMIA TYPE: ICD-10-CM

## 2025-03-20 DIAGNOSIS — F32.1 MAJOR DEPRESSIVE DISORDER, SINGLE EPISODE, MODERATE (HCC): ICD-10-CM

## 2025-03-20 DIAGNOSIS — E55.9 VITAMIN D DEFICIENCY: ICD-10-CM

## 2025-03-20 DIAGNOSIS — I10 ESSENTIAL (PRIMARY) HYPERTENSION: ICD-10-CM

## 2025-03-20 DIAGNOSIS — J47.9 BRONCHIECTASIS, UNCOMPLICATED (HCC): ICD-10-CM

## 2025-03-20 DIAGNOSIS — Z00.00 MEDICARE ANNUAL WELLNESS VISIT, SUBSEQUENT: Primary | ICD-10-CM

## 2025-03-20 DIAGNOSIS — M81.0 AGE-RELATED OSTEOPOROSIS WITHOUT CURRENT PATHOLOGICAL FRACTURE: ICD-10-CM

## 2025-03-20 LAB
25(OH)D3 SERPL-MCNC: 44.5 NG/ML (ref 30–100)
ALBUMIN SERPL-MCNC: 3.6 G/DL (ref 3.4–5)
ALBUMIN/GLOB SERPL: 1 (ref 0.8–1.7)
ALP SERPL-CCNC: 88 U/L (ref 45–117)
ALT SERPL-CCNC: 19 U/L (ref 13–56)
ANION GAP SERPL CALC-SCNC: 5 MMOL/L (ref 3–18)
AST SERPL-CCNC: 24 U/L (ref 10–38)
BILIRUB SERPL-MCNC: 0.5 MG/DL (ref 0.2–1)
BUN SERPL-MCNC: 11 MG/DL (ref 7–18)
BUN/CREAT SERPL: 19 (ref 12–20)
CALCIUM SERPL-MCNC: 8.9 MG/DL (ref 8.5–10.1)
CHLORIDE SERPL-SCNC: 96 MMOL/L (ref 100–111)
CHOLEST SERPL-MCNC: 192 MG/DL
CO2 SERPL-SCNC: 31 MMOL/L (ref 21–32)
CREAT SERPL-MCNC: 0.57 MG/DL (ref 0.6–1.3)
ERYTHROCYTE [DISTWIDTH] IN BLOOD BY AUTOMATED COUNT: 13.1 % (ref 11.6–14.5)
GLOBULIN SER CALC-MCNC: 3.6 G/DL (ref 2–4)
GLUCOSE SERPL-MCNC: 103 MG/DL (ref 74–99)
HCT VFR BLD AUTO: 45.4 % (ref 35–45)
HDLC SERPL-MCNC: 97 MG/DL (ref 40–60)
HDLC SERPL: 2 (ref 0–5)
HGB BLD-MCNC: 14 G/DL (ref 12–16)
LDLC SERPL CALC-MCNC: 83.4 MG/DL (ref 0–100)
LIPID PANEL: ABNORMAL
MCH RBC QN AUTO: 29.4 PG (ref 24–34)
MCHC RBC AUTO-ENTMCNC: 30.8 G/DL (ref 31–37)
MCV RBC AUTO: 95.2 FL (ref 78–100)
NRBC # BLD: 0 K/UL (ref 0–0.01)
NRBC BLD-RTO: 0 PER 100 WBC
PLATELET # BLD AUTO: 237 K/UL (ref 135–420)
PMV BLD AUTO: 10.1 FL (ref 9.2–11.8)
POTASSIUM SERPL-SCNC: 4 MMOL/L (ref 3.5–5.5)
PROT SERPL-MCNC: 7.2 G/DL (ref 6.4–8.2)
RBC # BLD AUTO: 4.77 M/UL (ref 4.2–5.3)
SODIUM SERPL-SCNC: 132 MMOL/L (ref 136–145)
TRIGL SERPL-MCNC: 58 MG/DL
VLDLC SERPL CALC-MCNC: 11.6 MG/DL
WBC # BLD AUTO: 5.3 K/UL (ref 4.6–13.2)

## 2025-03-20 PROCEDURE — 85027 COMPLETE CBC AUTOMATED: CPT

## 2025-03-20 PROCEDURE — 36415 COLL VENOUS BLD VENIPUNCTURE: CPT

## 2025-03-20 PROCEDURE — 82306 VITAMIN D 25 HYDROXY: CPT

## 2025-03-20 PROCEDURE — 80061 LIPID PANEL: CPT

## 2025-03-20 PROCEDURE — 80053 COMPREHEN METABOLIC PANEL: CPT

## 2025-03-20 SDOH — ECONOMIC STABILITY: FOOD INSECURITY: WITHIN THE PAST 12 MONTHS, THE FOOD YOU BOUGHT JUST DIDN'T LAST AND YOU DIDN'T HAVE MONEY TO GET MORE.: NEVER TRUE

## 2025-03-20 SDOH — ECONOMIC STABILITY: FOOD INSECURITY: WITHIN THE PAST 12 MONTHS, YOU WORRIED THAT YOUR FOOD WOULD RUN OUT BEFORE YOU GOT MONEY TO BUY MORE.: NEVER TRUE

## 2025-03-20 ASSESSMENT — PATIENT HEALTH QUESTIONNAIRE - PHQ9
1. LITTLE INTEREST OR PLEASURE IN DOING THINGS: NOT AT ALL
2. FEELING DOWN, DEPRESSED OR HOPELESS: NOT AT ALL
6. FEELING BAD ABOUT YOURSELF - OR THAT YOU ARE A FAILURE OR HAVE LET YOURSELF OR YOUR FAMILY DOWN: NOT AT ALL
SUM OF ALL RESPONSES TO PHQ QUESTIONS 1-9: 1
SUM OF ALL RESPONSES TO PHQ QUESTIONS 1-9: 1
10. IF YOU CHECKED OFF ANY PROBLEMS, HOW DIFFICULT HAVE THESE PROBLEMS MADE IT FOR YOU TO DO YOUR WORK, TAKE CARE OF THINGS AT HOME, OR GET ALONG WITH OTHER PEOPLE: NOT DIFFICULT AT ALL
4. FEELING TIRED OR HAVING LITTLE ENERGY: NOT AT ALL
7. TROUBLE CONCENTRATING ON THINGS, SUCH AS READING THE NEWSPAPER OR WATCHING TELEVISION: NOT AT ALL
8. MOVING OR SPEAKING SO SLOWLY THAT OTHER PEOPLE COULD HAVE NOTICED. OR THE OPPOSITE, BEING SO FIGETY OR RESTLESS THAT YOU HAVE BEEN MOVING AROUND A LOT MORE THAN USUAL: NOT AT ALL
5. POOR APPETITE OR OVEREATING: SEVERAL DAYS
SUM OF ALL RESPONSES TO PHQ QUESTIONS 1-9: 1
SUM OF ALL RESPONSES TO PHQ QUESTIONS 1-9: 1
9. THOUGHTS THAT YOU WOULD BE BETTER OFF DEAD, OR OF HURTING YOURSELF: NOT AT ALL
3. TROUBLE FALLING OR STAYING ASLEEP: NOT AT ALL

## 2025-03-20 ASSESSMENT — LIFESTYLE VARIABLES
HOW OFTEN DO YOU HAVE A DRINK CONTAINING ALCOHOL: NEVER
HOW MANY STANDARD DRINKS CONTAINING ALCOHOL DO YOU HAVE ON A TYPICAL DAY: PATIENT DOES NOT DRINK

## 2025-03-20 NOTE — PATIENT INSTRUCTIONS
instruction, always ask your healthcare professional. [employer], [facility], disclaims any warranty or liability for your use of this information.    Personalized Preventive Plan for Mercedez Parnell - 3/20/2025  Medicare offers a range of preventive health benefits. Some of the tests and screenings are paid in full while other may be subject to a deductible, co-insurance, and/or copay.  Some of these benefits include a comprehensive review of your medical history including lifestyle, illnesses that may run in your family, and various assessments and screenings as appropriate.  After reviewing your medical record and screening and assessments performed today your provider may have ordered immunizations, labs, imaging, and/or referrals for you.  A list of these orders (if applicable) as well as your Preventive Care list are included within your After Visit Summary for your review.

## 2025-03-20 NOTE — PROGRESS NOTES
\"Have you been to the ER, urgent care clinic since your last visit?  Hospitalized since your last visit?\"    NO    “Have you seen or consulted any other health care providers outside our system since your last visit?”    NO             
MD Roberta   albuterol sulfate HFA (VENTOLIN HFA) 108 (90 Base) MCG/ACT inhaler Inhale 2 puffs into the lungs 4 times daily as needed for Wheezing or Shortness of Breath Yes Roberta Capone MD   Multiple Vitamins-Minerals (THERAPEUTIC MULTIVITAMIN-MINERALS) tablet Take 1 tablet by mouth daily Yes ProviderPantera MD   Calcium Citrate-Vitamin D (CALCIUM + D PO) Take by mouth Yes Pantera Car MD   B Complex Vitamins (B-COMPLEX/B-12 PO) Take by mouth Yes ProviderPantera MD   Fexofenadine HCl (MUCINEX ALLERGY PO) Take by mouth Yes Pantera Car MD   lansoprazole (PREVACID) 30 MG delayed release capsule Take by mouth every morning (before breakfast) Yes Automatic Reconciliation, Ar       CareTeam (Including outside providers/suppliers regularly involved in providing care):   Patient Care Team:  Roberta Capone MD as PCP - General  Roberta Capone MD as PCP - Empaneled Provider     Recommendations for Preventive Services Due: see orders and patient instructions/AVS.  Recommended screening schedule for the next 5-10 years is provided to the patient in written form: see Patient Instructions/AVS.     Reviewed and updated this visit:  Tobacco  Allergies  Meds  Problems  Med Hx  Surg Hx  Fam Hx  Sexual   Hx

## 2025-03-21 ENCOUNTER — TELEPHONE (OUTPATIENT)
Facility: CLINIC | Age: 85
End: 2025-03-21

## 2025-03-21 NOTE — TELEPHONE ENCOUNTER
Ms. Parnell's son called and stated the the [facility] had called to schedule an appointment for Ms. Parnell to get a polio shot. They told him that they need Dr. Capone to provide more information regarding this in order for her to get scheduled. Thank you

## 2025-04-08 ENCOUNTER — RESULTS FOLLOW-UP (OUTPATIENT)
Facility: CLINIC | Age: 85
End: 2025-04-08

## 2025-04-28 DIAGNOSIS — F32.1 MAJOR DEPRESSIVE DISORDER, SINGLE EPISODE, MODERATE (HCC): ICD-10-CM

## 2025-04-28 RX ORDER — MIRTAZAPINE 45 MG/1
45 TABLET, FILM COATED ORAL NIGHTLY
Qty: 90 TABLET | Refills: 1 | Status: SHIPPED | OUTPATIENT
Start: 2025-04-28

## 2025-04-28 NOTE — TELEPHONE ENCOUNTER
Pt's son called requesting refill for mirtazapine (REMERON) 45 MG tablet be sent to pharmacy on file.     Last appt: 3/20  Next appt: 9/22

## 2025-05-27 RX ORDER — SIMVASTATIN 10 MG
10 TABLET ORAL NIGHTLY
Qty: 90 TABLET | Refills: 1 | Status: SHIPPED | OUTPATIENT
Start: 2025-05-27

## 2025-06-23 DIAGNOSIS — I10 ESSENTIAL (PRIMARY) HYPERTENSION: ICD-10-CM

## 2025-06-23 NOTE — TELEPHONE ENCOUNTER
Pt called to request refill of amLODIPine (NORVASC) 5 MG tablet to be sent into pharmacy on file.       [id number] - [place] - [address] - P [phone] - F [phone]  [address] [place] [address]  Phone: [phone]  Fax: [phone]

## 2025-06-23 NOTE — TELEPHONE ENCOUNTER
Requested Prescriptions     Pending Prescriptions Disp Refills    amLODIPine (NORVASC) 5 MG tablet 90 tablet 1     Sig: Take 1 tablet by mouth daily     Last seen: 3/20/25  Next seen: 9/22/25    Last filled: 12/31/24 Qty 90 w/1 refill

## 2025-06-24 RX ORDER — AMLODIPINE BESYLATE 5 MG/1
5 TABLET ORAL DAILY
Qty: 90 TABLET | Refills: 1 | Status: SHIPPED | OUTPATIENT
Start: 2025-06-24